# Patient Record
Sex: FEMALE | Race: WHITE | Employment: OTHER | URBAN - METROPOLITAN AREA
[De-identification: names, ages, dates, MRNs, and addresses within clinical notes are randomized per-mention and may not be internally consistent; named-entity substitution may affect disease eponyms.]

---

## 2018-11-02 ENCOUNTER — APPOINTMENT (OUTPATIENT)
Dept: GENERAL RADIOLOGY | Age: 68
End: 2018-11-02
Payer: MEDICARE

## 2018-11-02 ENCOUNTER — HOSPITAL ENCOUNTER (EMERGENCY)
Age: 68
Discharge: HOME | End: 2018-11-02
Attending: EMERGENCY MEDICINE
Payer: MEDICARE

## 2018-11-02 ENCOUNTER — APPOINTMENT (OUTPATIENT)
Dept: CT IMAGING | Age: 68
End: 2018-11-02
Payer: MEDICARE

## 2018-11-02 VITALS
HEART RATE: 97 BPM | WEIGHT: 120 LBS | RESPIRATION RATE: 18 BRPM | SYSTOLIC BLOOD PRESSURE: 127 MMHG | TEMPERATURE: 98 F | DIASTOLIC BLOOD PRESSURE: 73 MMHG | OXYGEN SATURATION: 97 %

## 2018-11-02 DIAGNOSIS — F10.10 ETOH ABUSE: ICD-10-CM
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
## 2018-11-02 DIAGNOSIS — R74.01 TRANSAMINITIS: ICD-10-CM

## 2018-11-02 LAB
A/G RATIO: 0.8 (ref 1.1–2.2)
ALBUMIN SERPL-MCNC: 3.8 G/DL (ref 3.4–5)
ALP BLD-CCNC: 245 U/L (ref 40–129)
ALT SERPL-CCNC: 52 U/L (ref 10–40)
AMMONIA: 72 UMOL/L (ref 11–51)
ANION GAP SERPL CALCULATED.3IONS-SCNC: 14 MMOL/L (ref 3–16)
APTT: 36.7 SEC (ref 26–36)
AST SERPL-CCNC: 260 U/L (ref 15–37)
BILIRUB SERPL-MCNC: 2.2 MG/DL (ref 0–1)
BUN BLDV-MCNC: 3 MG/DL (ref 7–20)
CALCIUM SERPL-MCNC: 9.1 MG/DL (ref 8.3–10.6)
CHLORIDE BLD-SCNC: 99 MMOL/L (ref 99–110)
CO2: 27 MMOL/L (ref 21–32)
CREAT SERPL-MCNC: <0.5 MG/DL (ref 0.6–1.2)
GFR AFRICAN AMERICAN: >60
GFR NON-AFRICAN AMERICAN: >60
GLOBULIN: 5 G/DL
GLUCOSE BLD-MCNC: 117 MG/DL (ref 70–99)
HCT VFR BLD CALC: 42.8 % (ref 36–48)
HEMOGLOBIN: 14.7 G/DL (ref 12–16)
INR BLD: 1.19 (ref 0.86–1.14)
LACTIC ACID: 3.3 MMOL/L (ref 0.4–2)
MAGNESIUM: 2 MG/DL (ref 1.8–2.4)
MCH RBC QN AUTO: 34.2 PG (ref 26–34)
MCHC RBC AUTO-ENTMCNC: 34.4 G/DL (ref 31–36)
MCV RBC AUTO: 99.6 FL (ref 80–100)
PDW BLD-RTO: 15.5 % (ref 12.4–15.4)
PLATELET # BLD: 132 K/UL (ref 135–450)
PMV BLD AUTO: 8 FL (ref 5–10.5)
POTASSIUM REFLEX MAGNESIUM: 3.4 MMOL/L (ref 3.5–5.1)
PROTHROMBIN TIME: 13.6 SEC (ref 9.8–13)
RBC # BLD: 4.3 M/UL (ref 4–5.2)
SODIUM BLD-SCNC: 140 MMOL/L (ref 136–145)
SPECIMEN STATUS: NORMAL
TOTAL PROTEIN: 8.8 G/DL (ref 6.4–8.2)
WBC # BLD: 5.3 K/UL (ref 4–11)

## 2018-11-02 PROCEDURE — 80053 COMPREHEN METABOLIC PANEL: CPT

## 2018-11-02 PROCEDURE — 96360 HYDRATION IV INFUSION INIT: CPT

## 2018-11-02 PROCEDURE — 36415 COLL VENOUS BLD VENIPUNCTURE: CPT

## 2018-11-02 PROCEDURE — 85027 COMPLETE CBC AUTOMATED: CPT

## 2018-11-02 PROCEDURE — 82140 ASSAY OF AMMONIA: CPT

## 2018-11-02 PROCEDURE — 70450 CT HEAD/BRAIN W/O DYE: CPT

## 2018-11-02 PROCEDURE — 83735 ASSAY OF MAGNESIUM: CPT

## 2018-11-02 PROCEDURE — 85730 THROMBOPLASTIN TIME PARTIAL: CPT

## 2018-11-02 PROCEDURE — 83605 ASSAY OF LACTIC ACID: CPT

## 2018-11-02 PROCEDURE — 71046 X-RAY EXAM CHEST 2 VIEWS: CPT

## 2018-11-02 PROCEDURE — 85610 PROTHROMBIN TIME: CPT

## 2018-11-02 PROCEDURE — 93005 ELECTROCARDIOGRAM TRACING: CPT | Performed by: EMERGENCY MEDICINE

## 2018-11-02 PROCEDURE — 2580000003 HC RX 258: Performed by: EMERGENCY MEDICINE

## 2018-11-02 PROCEDURE — 99284 EMERGENCY DEPT VISIT MOD MDM: CPT

## 2018-11-02 RX ORDER — 0.9 % SODIUM CHLORIDE 0.9 %
1000 INTRAVENOUS SOLUTION INTRAVENOUS ONCE
Status: COMPLETED | OUTPATIENT
Start: 2018-11-02 | End: 2018-11-02

## 2018-11-02 RX ORDER — OMEPRAZOLE 20 MG/1
20 CAPSULE, DELAYED RELEASE ORAL DAILY
Status: ON HOLD | COMMUNITY
End: 2018-12-10 | Stop reason: HOSPADM

## 2018-11-02 RX ADMIN — SODIUM CHLORIDE 1000 ML: 9 INJECTION, SOLUTION INTRAVENOUS at 21:58

## 2018-11-03 LAB
EKG ATRIAL RATE: 95 BPM
EKG DIAGNOSIS: NORMAL
EKG P AXIS: 77 DEGREES
EKG P-R INTERVAL: 172 MS
EKG Q-T INTERVAL: 390 MS
EKG QRS DURATION: 88 MS
EKG QTC CALCULATION (BAZETT): 490 MS
EKG R AXIS: 67 DEGREES
EKG T AXIS: 61 DEGREES
EKG VENTRICULAR RATE: 95 BPM

## 2018-11-03 PROCEDURE — 93010 ELECTROCARDIOGRAM REPORT: CPT | Performed by: INTERNAL MEDICINE

## 2018-11-03 NOTE — ED PROVIDER NOTES
David Robins CHIEF COMPLAINT  Fall (EMS called for possible fall by ,  patient outside for over 25 minutes, ETOH )      HISTORY OF PRESENT ILLNESS  Kirby Cody is a 80 y.o. female with a history of Esophagitis, EtOH abuse who presents to the ED complaining of fall while walking dog. Was in front yard and she says she just fell to her needs, did not trip and had no LOC. No headache or dizziness or vision changes. No abd pain or n/v/d. She does have hx of daily drinking. Report from triage was LOC but pt not altered. No sob or chest apin or palpitations. No other complaints, modifying factors or associated symptoms. I have reviewed the following from the nursing documentation. Past Medical History:   Diagnosis Date    Esophagitis     Hypertension      Past Surgical History:   Procedure Laterality Date    ENDOSCOPIC ULTRASOUND (UPPER)       History reviewed. No pertinent family history. Social History     Social History    Marital status:      Spouse name: N/A    Number of children: N/A    Years of education: N/A     Occupational History    Not on file. Social History Main Topics    Smoking status: Never Smoker    Smokeless tobacco: Never Used    Alcohol use Yes    Drug use: No    Sexual activity: Not on file     Other Topics Concern    Not on file     Social History Narrative    No narrative on file     Current Facility-Administered Medications   Medication Dose Route Frequency Provider Last Rate Last Dose    0.9 % sodium chloride bolus  1,000 mL Intravenous Once Jesse Marrero, DO         Current Outpatient Prescriptions   Medication Sig Dispense Refill    omeprazole (PRILOSEC) 20 MG delayed release capsule Take 20 mg by mouth daily       Allergies   Allergen Reactions    Azithromycin Nausea And Vomiting    Sulfa Antibiotics Rash       REVIEW OF SYSTEMS  10 systems reviewed, pertinent positives per HPI otherwise noted to be negative.     PHYSICAL EXAM  BP (!) 164/103 Pulse 108   Temp 98 °F (36.7 °C) (Oral)   Resp 22   Wt 120 lb (54.4 kg)   SpO2 96%   GENERAL APPEARANCE: Awake and alert. Cooperative. No acute distress. HEAD: Normocephalic. Atraumatic. EYES: PERRL. EOM's grossly intact. No icterus. ENT: Mucous membranes are moist.   NECK: Supple. HEART: RRR. LUNGS: Respirations unlabored. CTAB. Good air exchange. Speaking comfortably in full sentences. BACK: No midline spinal tenderness or step-off. ABDOMEN: Soft. Non-distended. Non-tender. No guarding or rebound. Normal bowel sounds. EXTREMITIES: No peripheral edema. Moves all extremities equally. All extremities neurovascularly intact. SKIN: Warm and dry. No acute rashes. No jaundice. NEUROLOGICAL: Alert and oriented. No gross facial drooping. Strength 5/5, sensation intact. Normal coordination. Gait normal.   PSYCHIATRIC: Normal mood and affect. LABS  I have reviewed all labs for this visit.    Results for orders placed or performed during the hospital encounter of 11/02/18   Comprehensive Metabolic Panel w/ Reflex to MG   Result Value Ref Range    Sodium 140 136 - 145 mmol/L    Potassium reflex Magnesium 3.4 (L) 3.5 - 5.1 mmol/L    Chloride 99 99 - 110 mmol/L    CO2 27 21 - 32 mmol/L    Anion Gap 14 3 - 16    Glucose 117 (H) 70 - 99 mg/dL    BUN 3 (L) 7 - 20 mg/dL    CREATININE <0.5 (L) 0.6 - 1.2 mg/dL    GFR Non-African American >60 >60    GFR African American >60 >60    Calcium 9.1 8.3 - 10.6 mg/dL    Total Protein 8.8 (H) 6.4 - 8.2 g/dL    Alb 3.8 3.4 - 5.0 g/dL    Albumin/Globulin Ratio 0.8 (L) 1.1 - 2.2    Total Bilirubin 2.2 (H) 0.0 - 1.0 mg/dL    Alkaline Phosphatase 245 (H) 40 - 129 U/L    ALT 52 (H) 10 - 40 U/L     (H) 15 - 37 U/L    Globulin 5.0 g/dL   CBC   Result Value Ref Range    WBC 5.3 4.0 - 11.0 K/uL    RBC 4.30 4.00 - 5.20 M/uL    Hemoglobin 14.7 12.0 - 16.0 g/dL    Hematocrit 42.8 36.0 - 48.0 %    MCV 99.6 80.0 - 100.0 fL    MCH 34.2 (H) 26.0 - 34.0 pg    MCHC 34.4 31.0 - 36.0

## 2018-11-09 ENCOUNTER — OFFICE VISIT (OUTPATIENT)
Dept: ORTHOPEDIC SURGERY | Age: 68
End: 2018-11-09
Payer: MEDICARE

## 2018-11-09 DIAGNOSIS — M89.8X1 PAIN IN SCAPULA: ICD-10-CM

## 2018-11-09 DIAGNOSIS — M51.34 DDD (DEGENERATIVE DISC DISEASE), THORACIC: ICD-10-CM

## 2018-11-09 DIAGNOSIS — R52 PAIN: Primary | ICD-10-CM

## 2018-11-09 PROCEDURE — G8400 PT W/DXA NO RESULTS DOC: HCPCS | Performed by: NURSE PRACTITIONER

## 2018-11-09 PROCEDURE — 99203 OFFICE O/P NEW LOW 30 MIN: CPT | Performed by: NURSE PRACTITIONER

## 2018-11-09 PROCEDURE — 1036F TOBACCO NON-USER: CPT | Performed by: NURSE PRACTITIONER

## 2018-11-09 PROCEDURE — 3017F COLORECTAL CA SCREEN DOC REV: CPT | Performed by: NURSE PRACTITIONER

## 2018-11-09 PROCEDURE — 1101F PT FALLS ASSESS-DOCD LE1/YR: CPT | Performed by: NURSE PRACTITIONER

## 2018-11-09 PROCEDURE — 1090F PRES/ABSN URINE INCON ASSESS: CPT | Performed by: NURSE PRACTITIONER

## 2018-11-09 PROCEDURE — 4040F PNEUMOC VAC/ADMIN/RCVD: CPT | Performed by: NURSE PRACTITIONER

## 2018-11-09 PROCEDURE — G8484 FLU IMMUNIZE NO ADMIN: HCPCS | Performed by: NURSE PRACTITIONER

## 2018-11-09 PROCEDURE — G8421 BMI NOT CALCULATED: HCPCS | Performed by: NURSE PRACTITIONER

## 2018-11-09 PROCEDURE — 1123F ACP DISCUSS/DSCN MKR DOCD: CPT | Performed by: NURSE PRACTITIONER

## 2018-11-09 PROCEDURE — G8427 DOCREV CUR MEDS BY ELIG CLIN: HCPCS | Performed by: NURSE PRACTITIONER

## 2018-11-09 RX ORDER — MELOXICAM 7.5 MG/1
7.5 TABLET ORAL DAILY
Qty: 30 TABLET | Refills: 0 | Status: SHIPPED | OUTPATIENT
Start: 2018-11-09 | End: 2018-12-06

## 2018-11-10 NOTE — PROGRESS NOTES
show: No acute fractures or deformities; quite a bit of degeneration noted throughout the thoracic spine    Assessment: Thoracic DDD; pain in scapula    Plan: The patient was given a prescription for meloxicam and instructed not to take any other NSAIDs while she is taking this medication. She was given an order for formal physical therapy. She will ice and heat area. She'll follow up with Dr. Kellie Peterson 4 weeks if her pain does not improve. No orders of the defined types were placed in this encounter.

## 2018-11-26 ENCOUNTER — OFFICE VISIT (OUTPATIENT)
Dept: ORTHOPEDIC SURGERY | Age: 68
End: 2018-11-26
Payer: MEDICARE

## 2018-11-26 VITALS — HEIGHT: 66 IN | BODY MASS INDEX: 19.37 KG/M2

## 2018-11-26 DIAGNOSIS — M40.204 KYPHOSIS OF THORACIC REGION, UNSPECIFIED KYPHOSIS TYPE: ICD-10-CM

## 2018-11-26 DIAGNOSIS — G25.89 SCAPULAR DYSKINESIS: Primary | ICD-10-CM

## 2018-11-26 DIAGNOSIS — M51.34 DDD (DEGENERATIVE DISC DISEASE), THORACIC: ICD-10-CM

## 2018-11-26 PROCEDURE — 4040F PNEUMOC VAC/ADMIN/RCVD: CPT | Performed by: FAMILY MEDICINE

## 2018-11-26 PROCEDURE — G8427 DOCREV CUR MEDS BY ELIG CLIN: HCPCS | Performed by: FAMILY MEDICINE

## 2018-11-26 PROCEDURE — 99203 OFFICE O/P NEW LOW 30 MIN: CPT | Performed by: FAMILY MEDICINE

## 2018-11-26 PROCEDURE — 1090F PRES/ABSN URINE INCON ASSESS: CPT | Performed by: FAMILY MEDICINE

## 2018-11-26 PROCEDURE — 1101F PT FALLS ASSESS-DOCD LE1/YR: CPT | Performed by: FAMILY MEDICINE

## 2018-11-26 PROCEDURE — 3017F COLORECTAL CA SCREEN DOC REV: CPT | Performed by: FAMILY MEDICINE

## 2018-11-26 PROCEDURE — G8484 FLU IMMUNIZE NO ADMIN: HCPCS | Performed by: FAMILY MEDICINE

## 2018-11-26 PROCEDURE — G8400 PT W/DXA NO RESULTS DOC: HCPCS | Performed by: FAMILY MEDICINE

## 2018-11-26 PROCEDURE — G8420 CALC BMI NORM PARAMETERS: HCPCS | Performed by: FAMILY MEDICINE

## 2018-11-26 PROCEDURE — 1036F TOBACCO NON-USER: CPT | Performed by: FAMILY MEDICINE

## 2018-11-26 PROCEDURE — 1123F ACP DISCUSS/DSCN MKR DOCD: CPT | Performed by: FAMILY MEDICINE

## 2018-11-26 RX ORDER — IBUPROFEN 600 MG/1
600 TABLET ORAL 3 TIMES DAILY PRN
Qty: 90 TABLET | Refills: 2 | Status: ON HOLD | OUTPATIENT
Start: 2018-11-26 | End: 2018-12-10 | Stop reason: HOSPADM

## 2018-12-06 ENCOUNTER — APPOINTMENT (OUTPATIENT)
Dept: GENERAL RADIOLOGY | Age: 68
DRG: 640 | End: 2018-12-06
Payer: MEDICARE

## 2018-12-06 ENCOUNTER — APPOINTMENT (OUTPATIENT)
Dept: CT IMAGING | Age: 68
DRG: 640 | End: 2018-12-06
Payer: MEDICARE

## 2018-12-06 ENCOUNTER — HOSPITAL ENCOUNTER (INPATIENT)
Age: 68
LOS: 4 days | Discharge: HOME | DRG: 640 | End: 2018-12-10
Attending: EMERGENCY MEDICINE | Admitting: INTERNAL MEDICINE
Payer: MEDICARE

## 2018-12-06 DIAGNOSIS — R17 ELEVATED BILIRUBIN: ICD-10-CM

## 2018-12-06 DIAGNOSIS — E87.1 HYPONATREMIA: ICD-10-CM

## 2018-12-06 DIAGNOSIS — R79.89 ELEVATED LACTIC ACID LEVEL: ICD-10-CM

## 2018-12-06 DIAGNOSIS — R79.89 INCREASED AMMONIA LEVEL: ICD-10-CM

## 2018-12-06 DIAGNOSIS — E86.0 DEHYDRATION: ICD-10-CM

## 2018-12-06 DIAGNOSIS — R77.8 ELEVATED TROPONIN: ICD-10-CM

## 2018-12-06 DIAGNOSIS — R74.01 ELEVATED TRANSAMINASE LEVEL: ICD-10-CM

## 2018-12-06 DIAGNOSIS — E87.6 HYPOKALEMIA: Primary | ICD-10-CM

## 2018-12-06 PROBLEM — K76.82 HEPATIC ENCEPHALOPATHY: Status: ACTIVE | Noted: 2018-12-06

## 2018-12-06 PROBLEM — G93.41 ACUTE METABOLIC ENCEPHALOPATHY: Status: ACTIVE | Noted: 2018-12-06

## 2018-12-06 PROBLEM — E87.20 LACTIC ACIDOSIS: Status: ACTIVE | Noted: 2018-12-06

## 2018-12-06 PROBLEM — R94.31 QT PROLONGATION: Status: ACTIVE | Noted: 2018-12-06

## 2018-12-06 LAB
A/G RATIO: 0.7 (ref 1.1–2.2)
ALBUMIN SERPL-MCNC: 3 G/DL (ref 3.4–5)
ALP BLD-CCNC: 210 U/L (ref 40–129)
ALT SERPL-CCNC: 64 U/L (ref 10–40)
AMMONIA: 106 UMOL/L (ref 11–51)
ANION GAP SERPL CALCULATED.3IONS-SCNC: 10 MMOL/L (ref 3–16)
ANION GAP SERPL CALCULATED.3IONS-SCNC: 11 MMOL/L (ref 3–16)
AST SERPL-CCNC: 227 U/L (ref 15–37)
BASOPHILS ABSOLUTE: 0 K/UL (ref 0–0.2)
BASOPHILS RELATIVE PERCENT: 0.4 %
BILIRUB SERPL-MCNC: 3.9 MG/DL (ref 0–1)
BUN BLDV-MCNC: 6 MG/DL (ref 7–20)
BUN BLDV-MCNC: 6 MG/DL (ref 7–20)
CALCIUM SERPL-MCNC: 8.1 MG/DL (ref 8.3–10.6)
CALCIUM SERPL-MCNC: 8.7 MG/DL (ref 8.3–10.6)
CHLORIDE BLD-SCNC: 77 MMOL/L (ref 99–110)
CHLORIDE BLD-SCNC: 86 MMOL/L (ref 99–110)
CO2: 30 MMOL/L (ref 21–32)
CO2: 36 MMOL/L (ref 21–32)
CREAT SERPL-MCNC: <0.5 MG/DL (ref 0.6–1.2)
CREAT SERPL-MCNC: <0.5 MG/DL (ref 0.6–1.2)
EKG ATRIAL RATE: 92 BPM
EKG DIAGNOSIS: NORMAL
EKG P AXIS: 60 DEGREES
EKG P-R INTERVAL: 182 MS
EKG Q-T INTERVAL: 432 MS
EKG QRS DURATION: 104 MS
EKG QTC CALCULATION (BAZETT): 534 MS
EKG R AXIS: 42 DEGREES
EKG T AXIS: 53 DEGREES
EKG VENTRICULAR RATE: 92 BPM
EOSINOPHILS ABSOLUTE: 0 K/UL (ref 0–0.6)
EOSINOPHILS RELATIVE PERCENT: 0.2 %
GFR AFRICAN AMERICAN: >60
GFR AFRICAN AMERICAN: >60
GFR NON-AFRICAN AMERICAN: >60
GFR NON-AFRICAN AMERICAN: >60
GLOBULIN: 4.6 G/DL
GLUCOSE BLD-MCNC: 125 MG/DL (ref 70–99)
GLUCOSE BLD-MCNC: 136 MG/DL (ref 70–99)
HCT VFR BLD CALC: 41.5 % (ref 36–48)
HEMOGLOBIN: 14.5 G/DL (ref 12–16)
LACTIC ACID: 1.3 MMOL/L (ref 0.4–2)
LACTIC ACID: 2.2 MMOL/L (ref 0.4–2)
LYMPHOCYTES ABSOLUTE: 0.5 K/UL (ref 1–5.1)
LYMPHOCYTES RELATIVE PERCENT: 6.1 %
MAGNESIUM: 2.1 MG/DL (ref 1.8–2.4)
MAGNESIUM: 2.2 MG/DL (ref 1.8–2.4)
MCH RBC QN AUTO: 34.8 PG (ref 26–34)
MCHC RBC AUTO-ENTMCNC: 34.9 G/DL (ref 31–36)
MCV RBC AUTO: 99.8 FL (ref 80–100)
MONOCYTES ABSOLUTE: 0.6 K/UL (ref 0–1.3)
MONOCYTES RELATIVE PERCENT: 7.1 %
NEUTROPHILS ABSOLUTE: 7.6 K/UL (ref 1.7–7.7)
NEUTROPHILS RELATIVE PERCENT: 86.2 %
PDW BLD-RTO: 15.8 % (ref 12.4–15.4)
PLATELET # BLD: 187 K/UL (ref 135–450)
PMV BLD AUTO: 8.7 FL (ref 5–10.5)
POTASSIUM REFLEX MAGNESIUM: 3 MMOL/L (ref 3.5–5.1)
POTASSIUM SERPL-SCNC: 2.1 MMOL/L (ref 3.5–5.1)
POTASSIUM SERPL-SCNC: 4 MMOL/L (ref 3.5–5.1)
RBC # BLD: 4.16 M/UL (ref 4–5.2)
SODIUM BLD-SCNC: 123 MMOL/L (ref 136–145)
SODIUM BLD-SCNC: 127 MMOL/L (ref 136–145)
TOTAL PROTEIN: 7.6 G/DL (ref 6.4–8.2)
TROPONIN: 0.02 NG/ML
TROPONIN: 0.04 NG/ML
TROPONIN: 0.06 NG/ML
WBC # BLD: 8.8 K/UL (ref 4–11)

## 2018-12-06 PROCEDURE — 99285 EMERGENCY DEPT VISIT HI MDM: CPT

## 2018-12-06 PROCEDURE — 93005 ELECTROCARDIOGRAM TRACING: CPT | Performed by: EMERGENCY MEDICINE

## 2018-12-06 PROCEDURE — 6370000000 HC RX 637 (ALT 250 FOR IP): Performed by: INTERNAL MEDICINE

## 2018-12-06 PROCEDURE — 93010 ELECTROCARDIOGRAM REPORT: CPT | Performed by: INTERNAL MEDICINE

## 2018-12-06 PROCEDURE — 80053 COMPREHEN METABOLIC PANEL: CPT

## 2018-12-06 PROCEDURE — 72125 CT NECK SPINE W/O DYE: CPT

## 2018-12-06 PROCEDURE — 2580000003 HC RX 258: Performed by: EMERGENCY MEDICINE

## 2018-12-06 PROCEDURE — 84484 ASSAY OF TROPONIN QUANT: CPT

## 2018-12-06 PROCEDURE — 6370000000 HC RX 637 (ALT 250 FOR IP): Performed by: NURSE PRACTITIONER

## 2018-12-06 PROCEDURE — 83735 ASSAY OF MAGNESIUM: CPT

## 2018-12-06 PROCEDURE — 6360000002 HC RX W HCPCS: Performed by: INTERNAL MEDICINE

## 2018-12-06 PROCEDURE — 6370000000 HC RX 637 (ALT 250 FOR IP)

## 2018-12-06 PROCEDURE — 36415 COLL VENOUS BLD VENIPUNCTURE: CPT

## 2018-12-06 PROCEDURE — 2580000003 HC RX 258: Performed by: INTERNAL MEDICINE

## 2018-12-06 PROCEDURE — 82140 ASSAY OF AMMONIA: CPT

## 2018-12-06 PROCEDURE — 83930 ASSAY OF BLOOD OSMOLALITY: CPT

## 2018-12-06 PROCEDURE — 70450 CT HEAD/BRAIN W/O DYE: CPT

## 2018-12-06 PROCEDURE — 1200000000 HC SEMI PRIVATE

## 2018-12-06 PROCEDURE — 83605 ASSAY OF LACTIC ACID: CPT

## 2018-12-06 PROCEDURE — 84132 ASSAY OF SERUM POTASSIUM: CPT

## 2018-12-06 PROCEDURE — 85025 COMPLETE CBC W/AUTO DIFF WBC: CPT

## 2018-12-06 PROCEDURE — 6360000002 HC RX W HCPCS: Performed by: EMERGENCY MEDICINE

## 2018-12-06 PROCEDURE — 71046 X-RAY EXAM CHEST 2 VIEWS: CPT

## 2018-12-06 PROCEDURE — 96374 THER/PROPH/DIAG INJ IV PUSH: CPT

## 2018-12-06 RX ORDER — LACTULOSE 10 G/15ML
20 SOLUTION ORAL 3 TIMES DAILY
Status: DISCONTINUED | OUTPATIENT
Start: 2018-12-06 | End: 2018-12-07

## 2018-12-06 RX ORDER — PANTOPRAZOLE SODIUM 40 MG/1
40 TABLET, DELAYED RELEASE ORAL
Status: DISCONTINUED | OUTPATIENT
Start: 2018-12-07 | End: 2018-12-10 | Stop reason: HOSPADM

## 2018-12-06 RX ORDER — SODIUM CHLORIDE 0.9 % (FLUSH) 0.9 %
10 SYRINGE (ML) INJECTION EVERY 12 HOURS SCHEDULED
Status: DISCONTINUED | OUTPATIENT
Start: 2018-12-06 | End: 2018-12-06 | Stop reason: SDUPTHER

## 2018-12-06 RX ORDER — ONDANSETRON 2 MG/ML
4 INJECTION INTRAMUSCULAR; INTRAVENOUS ONCE
Status: COMPLETED | OUTPATIENT
Start: 2018-12-06 | End: 2018-12-06

## 2018-12-06 RX ORDER — SODIUM CHLORIDE 0.9 % (FLUSH) 0.9 %
10 SYRINGE (ML) INJECTION PRN
Status: DISCONTINUED | OUTPATIENT
Start: 2018-12-06 | End: 2018-12-10 | Stop reason: HOSPADM

## 2018-12-06 RX ORDER — SODIUM CHLORIDE 9 MG/ML
INJECTION, SOLUTION INTRAVENOUS CONTINUOUS
Status: DISCONTINUED | OUTPATIENT
Start: 2018-12-06 | End: 2018-12-08

## 2018-12-06 RX ORDER — LACTULOSE 10 G/15ML
20 SOLUTION ORAL ONCE
Status: COMPLETED | OUTPATIENT
Start: 2018-12-06 | End: 2018-12-06

## 2018-12-06 RX ORDER — POTASSIUM CHLORIDE AND SODIUM CHLORIDE 900; 300 MG/100ML; MG/100ML
INJECTION, SOLUTION INTRAVENOUS CONTINUOUS
Status: DISCONTINUED | OUTPATIENT
Start: 2018-12-06 | End: 2018-12-06

## 2018-12-06 RX ORDER — SODIUM CHLORIDE 0.9 % (FLUSH) 0.9 %
10 SYRINGE (ML) INJECTION EVERY 12 HOURS SCHEDULED
Status: DISCONTINUED | OUTPATIENT
Start: 2018-12-06 | End: 2018-12-10 | Stop reason: HOSPADM

## 2018-12-06 RX ORDER — POTASSIUM CHLORIDE 20MEQ/15ML
40 LIQUID (ML) ORAL PRN
Status: DISCONTINUED | OUTPATIENT
Start: 2018-12-06 | End: 2018-12-10 | Stop reason: HOSPADM

## 2018-12-06 RX ORDER — ONDANSETRON 2 MG/ML
4 INJECTION INTRAMUSCULAR; INTRAVENOUS EVERY 6 HOURS PRN
Status: CANCELLED | OUTPATIENT
Start: 2018-12-06

## 2018-12-06 RX ORDER — 0.9 % SODIUM CHLORIDE 0.9 %
1000 INTRAVENOUS SOLUTION INTRAVENOUS ONCE
Status: COMPLETED | OUTPATIENT
Start: 2018-12-06 | End: 2018-12-06

## 2018-12-06 RX ORDER — POTASSIUM CHLORIDE 20 MEQ/1
40 TABLET, EXTENDED RELEASE ORAL PRN
Status: DISCONTINUED | OUTPATIENT
Start: 2018-12-06 | End: 2018-12-10 | Stop reason: HOSPADM

## 2018-12-06 RX ORDER — POTASSIUM CHLORIDE 7.45 MG/ML
10 INJECTION INTRAVENOUS PRN
Status: DISCONTINUED | OUTPATIENT
Start: 2018-12-06 | End: 2018-12-10 | Stop reason: HOSPADM

## 2018-12-06 RX ORDER — SODIUM CHLORIDE 0.9 % (FLUSH) 0.9 %
10 SYRINGE (ML) INJECTION PRN
Status: DISCONTINUED | OUTPATIENT
Start: 2018-12-06 | End: 2018-12-06 | Stop reason: SDUPTHER

## 2018-12-06 RX ADMIN — SODIUM CHLORIDE 1000 ML: 9 INJECTION, SOLUTION INTRAVENOUS at 12:13

## 2018-12-06 RX ADMIN — POTASSIUM CHLORIDE 10 MEQ: 7.46 INJECTION, SOLUTION INTRAVENOUS at 18:21

## 2018-12-06 RX ADMIN — LACTULOSE 20 G: 20 SOLUTION ORAL at 21:11

## 2018-12-06 RX ADMIN — ENOXAPARIN SODIUM 40 MG: 40 INJECTION SUBCUTANEOUS at 15:27

## 2018-12-06 RX ADMIN — POTASSIUM CHLORIDE 10 MEQ: 7.46 INJECTION, SOLUTION INTRAVENOUS at 21:11

## 2018-12-06 RX ADMIN — ONDANSETRON 4 MG: 2 INJECTION INTRAMUSCULAR; INTRAVENOUS at 12:12

## 2018-12-06 RX ADMIN — POTASSIUM CHLORIDE AND SODIUM CHLORIDE: 900; 300 INJECTION, SOLUTION INTRAVENOUS at 12:14

## 2018-12-06 RX ADMIN — SODIUM CHLORIDE: 9 INJECTION, SOLUTION INTRAVENOUS at 17:13

## 2018-12-06 RX ADMIN — Medication: at 17:13

## 2018-12-06 RX ADMIN — POTASSIUM CHLORIDE 10 MEQ: 7.46 INJECTION, SOLUTION INTRAVENOUS at 22:10

## 2018-12-06 RX ADMIN — LACTULOSE 20 G: 20 SOLUTION ORAL at 13:12

## 2018-12-06 RX ADMIN — POTASSIUM CHLORIDE AND SODIUM CHLORIDE: 900; 300 INJECTION, SOLUTION INTRAVENOUS at 15:28

## 2018-12-06 RX ADMIN — LACTULOSE 20 G: 20 SOLUTION ORAL at 15:27

## 2018-12-06 RX ADMIN — POTASSIUM CHLORIDE 40 MEQ: 20 SOLUTION ORAL at 17:12

## 2018-12-06 NOTE — H&P
Hospital Medicine History & Physical      PCP: Ethel Velazquez    Date of Admission: 12/6/2018    Date of Service: Pt seen/examined on above date  and Admitted to Inpatient with expected LOS greater than two midnights due to medical therapy. Chief Complaint: generalized weakness     History Of Present Illness:   80 y.o. female with hx of esophagitis, HTN  who presented to [facility] with generalized weakness with poor PO intake due to poor appetite for several weeks which has been getting progressively worse in past few days and needing assistance with her ADLs with unsteady gait. Was confused yesterday and more lethargic,  denied noticing any facial droop, slurred speech or focal weakness. Pt has been following with GI, Dr Gilma Hardin for abnormal LFTs and recently had liver US and is supposed to f/u with him tomorrow. Pt denied any chest pain, fever, chills, nausea, melena, hematochezia,  vomiting or diarrhea. Hx of heavy alcohol use but quit 6 weeks ago. Had a mechanical fall while walking her dog about a month ago but denied hitting her head or LOC. No falls since then. Past Medical History:          Diagnosis Date    Esophagitis     Hypertension        Past Surgical History:          Procedure Laterality Date    ENDOSCOPIC ULTRASOUND (UPPER)         Medications Prior to Admission:      Prior to Admission medications    Medication Sig Start Date End Date Taking? Authorizing Provider   ibuprofen (ADVIL;MOTRIN) 600 MG tablet Take 1 tablet by mouth 3 times daily as needed for Pain 11/26/18  Yes Cristina Elizabeth MD   omeprazole (PRILOSEC) 20 MG delayed release capsule Take 20 mg by mouth daily   Yes Historical Provider, MD       Allergies:  Azithromycin and Sulfa antibiotics    Social History:      The patient currently lives with     TOBACCO:   reports that she has never smoked. She has never used smokeless tobacco.  ETOH:   reports that she drinks alcohol.       Family History: Kathia Calvilloevaria, BACTERIA, RBCUA, BLOODU, [address], [address]    Radiology:     EKG:  I have reviewed the EKG with the following interpretation: NSR with no acute ST-T changes. Prolonged . CT Head WO Contrast   Preliminary Result   1. Mild atrophy. Atherosclerotic calcification of the vasculature. Mild   chronic small vessel ischemic white matter disease. Stable right basal   ganglia lacunar infarct. 2. No clear evidence for acute intracranial hemorrhage or midline shift. 3. Overall, stable noncontrast head CT compared with 11/02/2018. CT Cervical Spine WO Contrast   Final Result   No acute abnormality of the cervical spine. Mild degenerative changes lower cervical spine. XR CHEST STANDARD (2 VW)   Final Result   No acute abnormality           RUQ US done on 10/10/2018  Gallstones with thickened gallbladder wall. Gallbladder wall thickening is nonspecific, but is associated with cholecystitis, both acute and chronic. Echogenic liver consistent with diffuse hepatocellular disease such as fatty infiltration (hepatic steatosis), acute or chronic hepatitis, or cirrhosis  ASSESSMENT:    Active Hospital Problems    Diagnosis Date Noted    Hepatic encephalopathy ([facility] [address] [K72.90] 12/06/2018    Hyponatremia [E87.1] 12/06/2018    Acute metabolic encephalopathy [S62.51] 12/06/2018    Hypokalemia [E87.6] 12/06/2018    Elevated troponin [R74.8] 12/06/2018    QT prolongation [R94.31] 12/06/2018    Lactic acidosis [E87.2] 12/06/2018    Elevated LFTs [R94.5] 12/06/2018         PLAN:      Acute metab encephalopathy : likely due to dehydration, hepatic encephalopathy, hyponatremia. CT head was non acute - old rt basal ganglial lacunar infarct. Supportive care for acute morbidities     Hyponatremia: likely due to vol depletion, beer protomania ( though pt reports she quit alcohol use 6 weeks ago).  Start IVF, further eval with osmolality studies, nephro consult     Hepatic encephalopathy:

## 2018-12-06 NOTE — ED PROVIDER NOTES
SYSTEM PROVIDED HISTORY: CONFUSION/DELIRIUM, ALTERED LOC, UNEXPLAINED TECHNOLOGIST PROVIDED HISTORY: Has a \"code stroke\" or \"stroke alert\" been called? ->No Ordering Physician Provided Reason for Exam: fall 1mth ago, pt states she is weak and doesnt want to eat Acuity: Acute Type of Exam: Initial A 27-year-old female with confusion and delirium; altered level of consciousness FINDINGS: BRAIN/VENTRICLES: Foramen magnum and 4th ventricle appear patent. Atherosclerotic calcification of the bilateral vertebral and parasellar carotid arteries. Stable mild diffuse prominence of the sulci, cisterns, and extra-axial spaces. No abnormal extra-axial fluid collections identified. Stable mild enlargement of the ventricles which are unchanged in size and remain midline in position. Stable right basal ganglia lacunar infarct on image 25, series. Stable mild periventricular and subcortical white matter hypoattenuation, most consistent with mild chronic small vessel ischemic white matter disease. ORBITS: The visualized portion of the orbits demonstrate no acute abnormality. SINUSES: The visualized paranasal sinuses and mastoid air cells demonstrate no acute abnormality. SOFT TISSUES/SKULL:  No acute abnormality of the visualized skull or soft tissues. 1. Mild atrophy. Atherosclerotic calcification of the vasculature. Mild chronic small vessel ischemic white matter disease. Stable right basal ganglia lacunar infarct. 2. No clear evidence for acute intracranial hemorrhage or midline shift. 3. Overall, stable noncontrast head CT compared with 11/02/2018. Ct Cervical Spine Wo Contrast    Result Date: 12/6/2018  EXAMINATION: CT OF THE CERVICAL SPINE WITHOUT CONTRAST 12/6/2018 12:29 pm TECHNIQUE: CT of the cervical spine was performed without the administration of intravenous contrast. Multiplanar reformatted images are provided for review.  Dose modulation, iterative reconstruction, and/or weight based adjustment of the

## 2018-12-06 NOTE — ED PROVIDER NOTES
[facility]  ED      CHIEF COMPLAINT  Fatigue (x 3 weeks, not eating, too weak to ambulate on own, )      HISTORY OF PRESENT ILLNESS  Juhi Monroe is a 80 y.o. female who presents to the ED complaining of fatigue. Reports nausea today, chronic back issues. Not eating well for couple of weeks, completely lost her appetite, shaky, leg swelling Monday night (feet and ankles). Denies fevers, chills, sweats. Denies CP, SOB. Denies abdominal. Denies vomiting or diarrhea.  provided most of the HPI as patient was unable to contribute much due to her current medical condition. Seeing Dr. Heidi Colvin for elevated liver enzymes, had a scan at his office yesterday, scheduled follow up tomorrow. Elevated liver enzymes were found with a routine physical.     The patient is currently rating their pain as 0/10. Treatments tried prior to arrival in the ED include none. The patient denies other complaints, modifying factors or associated symptoms. The patient arrived to the ED via EMS transport. Patient is accompanied by  who is bedside for the visit. PAST MEDICAL HISTORY    Past Medical History:   Diagnosis Date    Esophagitis     Hypertension        SURGICAL HISTORY    Past Surgical History:   Procedure Laterality Date    ENDOSCOPIC ULTRASOUND (UPPER)         CURRENT MEDICATIONS    Current Outpatient Rx   Medication Sig Dispense Refill    ibuprofen (ADVIL;MOTRIN) 600 MG tablet Take 1 tablet by mouth 3 times daily as needed for Pain 90 tablet 2    omeprazole (PRILOSEC) 20 MG delayed release capsule Take 20 mg by mouth daily         ALLERGIES    Allergies   Allergen Reactions    Azithromycin Nausea And Vomiting    Sulfa Antibiotics Rash       FAMILY HISTORY    History reviewed. No pertinent family history.     SOCIAL HISTORY    Social History     Social History    Marital status:      Spouse name: N/A    Number of children: N/A    Years of education: N/A     Social 12/06/18   CBC Auto Differential   Result Value Ref Range    WBC 8.8 4.0 - 11.0 K/uL    RBC 4.16 4.00 - 5.20 M/uL    Hemoglobin 14.5 12.0 - 16.0 g/dL    Hematocrit 41.5 36.0 - 48.0 %    MCV 99.8 80.0 - 100.0 fL    MCH 34.8 (H) 26.0 - 34.0 pg    MCHC 34.9 31.0 - 36.0 g/dL    RDW 15.8 (H) 12.4 - 15.4 %    Platelets 638 631 - 239 K/uL    MPV 8.7 5.0 - 10.5 fL    Neutrophils % 86.2 %    Lymphocytes % 6.1 %    Monocytes % 7.1 %    Eosinophils % 0.2 %    Basophils % 0.4 %    Neutrophils # 7.6 1.7 - 7.7 K/uL    Lymphocytes # 0.5 (L) 1.0 - 5.1 K/uL    Monocytes # 0.6 0.0 - 1.3 K/uL    Eosinophils # 0.0 0.0 - 0.6 K/uL    Basophils # 0.0 0.0 - 0.2 K/uL   Comprehensive Metabolic Panel   Result Value Ref Range    Sodium 123 (L) 136 - 145 mmol/L    Potassium 2.1 (LL) 3.5 - 5.1 mmol/L    Chloride 77 (L) 99 - 110 mmol/L    CO2 36 (H) 21 - 32 mmol/L    Anion Gap 10 3 - 16    Glucose 136 (H) 70 - 99 mg/dL    BUN 6 (L) 7 - 20 mg/dL    CREATININE <0.5 (L) 0.6 - 1.2 mg/dL    GFR Non-African American >60 >60    GFR African American >60 >60    Calcium 8.7 8.3 - 10.6 mg/dL    Total Protein 7.6 6.4 - 8.2 g/dL    Alb 3.0 (L) 3.4 - 5.0 g/dL    Albumin/Globulin Ratio 0.7 (L) 1.1 - 2.2    Total Bilirubin 3.9 (H) 0.0 - 1.0 mg/dL    Alkaline Phosphatase 210 (H) 40 - 129 U/L    ALT 64 (H) 10 - 40 U/L     (H) 15 - 37 U/L    Globulin 4.6 g/dL   Troponin   Result Value Ref Range    Troponin 0.02 (H) <0.01 ng/mL   Lactic Acid, Plasma   Result Value Ref Range    Lactic Acid 2.2 (H) 0.4 - 2.0 mmol/L   Ammonia   Result Value Ref Range    Ammonia 106 (HH) 11 - 51 umol/L   Magnesium   Result Value Ref Range    Magnesium 2.20 1.80 - 2.40 mg/dL   EKG 12 Lead   Result Value Ref Range    Ventricular Rate 92 BPM    Atrial Rate 92 BPM    P-R Interval 182 ms    QRS Duration 104 ms    Q-T Interval 432 ms    QTc Calculation (Bazett) 534 ms    P Axis 60 degrees    R Axis 42 degrees    T Axis 53 degrees    Diagnosis       Normal sinus rhythmNonspecific T wave is no prevertebral soft tissue swelling. No acute abnormality of the cervical spine. Mild degenerative changes lower cervical spine. ED COURSE/MDM  Patient seen and evaluated. Old records reviewed. Diagnostic testing reviewed and results discussed. I have seen and evaluated this patient with supervising physician: Cadence Bray MD. We thoroughly discussed the history, physical exam, diagnostic testing, emergency department course, plan and disposition. Florian Spurling presented to the ED today with above noted complaints. Physical exam is reveal the patient to appear to not feel well but is not toxic. She does appear dehydrated as her mucous membranes are dry and her lips are dry and cracked. CBC is without evidence of systemic infection as there is no leukocytosis or differential shift. H&H is normal and stable. Platelets are within normal limits. CMP shows a low sodium of 123, low potassium of 2.1, low chloride of 77. BUN/creatinine also low at 6/0.5. Total bilirubin is elevated at 3.9, alk phos elevated at 210, ALT elevated at 64, AST elevated at 227. Ammonia level is elevated at 106. Patient will be given lactulose for this. Magnesium is normal at 2.2. EKG shows normal sinus rhythm, nonspecific T wave abnormality seen. Troponin is elevated at 0.02. Lactic acid level is elevated at 2.2. Patient will be given IV fluid bolus in the ED for this level. I do not feel that this is related to infection, I feel this is more related to dehydration. CXR is without acute findings. CT head was obtained and without acute findings. There is a stable right basal ganglia lacunar infarct. CT cervical spine without acute findings.     Pt was given the following medications or treatments in the ED:   Medications   0.9% NaCl with KCl 40 mEq infusion ( Intravenous New Bag 12/6/18 1214)   0.9 % sodium chloride bolus (1,000 mLs Intravenous New Bag 12/6/18 1213)   ondansetron (ZOFRAN)

## 2018-12-07 PROBLEM — E44.0 MODERATE MALNUTRITION (HCC): Chronic | Status: ACTIVE | Noted: 2018-12-07

## 2018-12-07 LAB
ALBUMIN SERPL-MCNC: 2.4 G/DL (ref 3.4–5)
ALP BLD-CCNC: 145 U/L (ref 40–129)
ALT SERPL-CCNC: 50 U/L (ref 10–40)
AMMONIA: 100 UMOL/L (ref 11–51)
ANION GAP SERPL CALCULATED.3IONS-SCNC: 9 MMOL/L (ref 3–16)
ANION GAP SERPL CALCULATED.3IONS-SCNC: 9 MMOL/L (ref 3–16)
AST SERPL-CCNC: 177 U/L (ref 15–37)
BASOPHILS ABSOLUTE: 0 K/UL (ref 0–0.2)
BASOPHILS RELATIVE PERCENT: 0.5 %
BILIRUB SERPL-MCNC: 3.1 MG/DL (ref 0–1)
BILIRUBIN DIRECT: 1.8 MG/DL (ref 0–0.3)
BILIRUBIN, INDIRECT: 1.3 MG/DL (ref 0–1)
BUN BLDV-MCNC: 5 MG/DL (ref 7–20)
BUN BLDV-MCNC: 6 MG/DL (ref 7–20)
CALCIUM SERPL-MCNC: 7.8 MG/DL (ref 8.3–10.6)
CALCIUM SERPL-MCNC: 8.1 MG/DL (ref 8.3–10.6)
CHLORIDE BLD-SCNC: 92 MMOL/L (ref 99–110)
CHLORIDE BLD-SCNC: 95 MMOL/L (ref 99–110)
CHLORIDE URINE RANDOM: <20 MMOL/L
CO2: 27 MMOL/L (ref 21–32)
CO2: 30 MMOL/L (ref 21–32)
CREAT SERPL-MCNC: <0.5 MG/DL (ref 0.6–1.2)
CREAT SERPL-MCNC: <0.5 MG/DL (ref 0.6–1.2)
CREATININE URINE: 53.2 MG/DL (ref 28–259)
EKG ATRIAL RATE: 80 BPM
EKG DIAGNOSIS: NORMAL
EKG P AXIS: 72 DEGREES
EKG P-R INTERVAL: 174 MS
EKG Q-T INTERVAL: 452 MS
EKG QRS DURATION: 98 MS
EKG QTC CALCULATION (BAZETT): 521 MS
EKG R AXIS: 46 DEGREES
EKG T AXIS: 35 DEGREES
EKG VENTRICULAR RATE: 80 BPM
EOSINOPHILS ABSOLUTE: 0.1 K/UL (ref 0–0.6)
EOSINOPHILS RELATIVE PERCENT: 0.9 %
GFR AFRICAN AMERICAN: >60
GFR AFRICAN AMERICAN: >60
GFR NON-AFRICAN AMERICAN: >60
GFR NON-AFRICAN AMERICAN: >60
GLUCOSE BLD-MCNC: 131 MG/DL (ref 70–99)
GLUCOSE BLD-MCNC: 91 MG/DL (ref 70–99)
HCT VFR BLD CALC: 35.9 % (ref 36–48)
HEMOGLOBIN: 12.5 G/DL (ref 12–16)
IRON SATURATION: 58 % (ref 15–50)
IRON: 93 UG/DL (ref 37–145)
LV EF: 55 %
LVEF MODALITY: NORMAL
LYMPHOCYTES ABSOLUTE: 1.2 K/UL (ref 1–5.1)
LYMPHOCYTES RELATIVE PERCENT: 18.2 %
MCH RBC QN AUTO: 34.8 PG (ref 26–34)
MCHC RBC AUTO-ENTMCNC: 34.9 G/DL (ref 31–36)
MCV RBC AUTO: 99.6 FL (ref 80–100)
MONOCYTES ABSOLUTE: 0.5 K/UL (ref 0–1.3)
MONOCYTES RELATIVE PERCENT: 7.4 %
NEUTROPHILS ABSOLUTE: 4.8 K/UL (ref 1.7–7.7)
NEUTROPHILS RELATIVE PERCENT: 73 %
OSMOLALITY URINE: 254 MOSM/KG (ref 390–1070)
OSMOLALITY: 267 MOSM/KG (ref 278–305)
PDW BLD-RTO: 16.3 % (ref 12.4–15.4)
PLATELET # BLD: 159 K/UL (ref 135–450)
PMV BLD AUTO: 8.3 FL (ref 5–10.5)
POTASSIUM SERPL-SCNC: 2.8 MMOL/L (ref 3.5–5.1)
POTASSIUM SERPL-SCNC: 3.6 MMOL/L (ref 3.5–5.1)
POTASSIUM, UR: 22.6 MMOL/L
RBC # BLD: 3.6 M/UL (ref 4–5.2)
SODIUM BLD-SCNC: 131 MMOL/L (ref 136–145)
SODIUM BLD-SCNC: 131 MMOL/L (ref 136–145)
SODIUM URINE: 29 MMOL/L
TOTAL IRON BINDING CAPACITY: 160 UG/DL (ref 260–445)
TOTAL PROTEIN: 6 G/DL (ref 6.4–8.2)
TSH REFLEX FT4: 2.02 UIU/ML (ref 0.27–4.2)
WBC # BLD: 6.6 K/UL (ref 4–11)

## 2018-12-07 PROCEDURE — 83540 ASSAY OF IRON: CPT

## 2018-12-07 PROCEDURE — 82140 ASSAY OF AMMONIA: CPT

## 2018-12-07 PROCEDURE — 1200000000 HC SEMI PRIVATE

## 2018-12-07 PROCEDURE — 80076 HEPATIC FUNCTION PANEL: CPT

## 2018-12-07 PROCEDURE — 97110 THERAPEUTIC EXERCISES: CPT

## 2018-12-07 PROCEDURE — G8979 MOBILITY GOAL STATUS: HCPCS

## 2018-12-07 PROCEDURE — 93010 ELECTROCARDIOGRAM REPORT: CPT | Performed by: INTERNAL MEDICINE

## 2018-12-07 PROCEDURE — 36415 COLL VENOUS BLD VENIPUNCTURE: CPT

## 2018-12-07 PROCEDURE — 84300 ASSAY OF URINE SODIUM: CPT

## 2018-12-07 PROCEDURE — 2580000003 HC RX 258: Performed by: INTERNAL MEDICINE

## 2018-12-07 PROCEDURE — 6370000000 HC RX 637 (ALT 250 FOR IP): Performed by: INTERNAL MEDICINE

## 2018-12-07 PROCEDURE — 85025 COMPLETE CBC W/AUTO DIFF WBC: CPT

## 2018-12-07 PROCEDURE — G8988 SELF CARE GOAL STATUS: HCPCS

## 2018-12-07 PROCEDURE — 82436 ASSAY OF URINE CHLORIDE: CPT

## 2018-12-07 PROCEDURE — 93005 ELECTROCARDIOGRAM TRACING: CPT | Performed by: INTERNAL MEDICINE

## 2018-12-07 PROCEDURE — 6360000002 HC RX W HCPCS: Performed by: INTERNAL MEDICINE

## 2018-12-07 PROCEDURE — G8978 MOBILITY CURRENT STATUS: HCPCS

## 2018-12-07 PROCEDURE — 97530 THERAPEUTIC ACTIVITIES: CPT

## 2018-12-07 PROCEDURE — 83935 ASSAY OF URINE OSMOLALITY: CPT

## 2018-12-07 PROCEDURE — 82570 ASSAY OF URINE CREATININE: CPT

## 2018-12-07 PROCEDURE — 80048 BASIC METABOLIC PNL TOTAL CA: CPT

## 2018-12-07 PROCEDURE — 99223 1ST HOSP IP/OBS HIGH 75: CPT | Performed by: INTERNAL MEDICINE

## 2018-12-07 PROCEDURE — 97162 PT EVAL MOD COMPLEX 30 MIN: CPT

## 2018-12-07 PROCEDURE — 84133 ASSAY OF URINE POTASSIUM: CPT

## 2018-12-07 PROCEDURE — 93306 TTE W/DOPPLER COMPLETE: CPT

## 2018-12-07 PROCEDURE — 83550 IRON BINDING TEST: CPT

## 2018-12-07 PROCEDURE — 84443 ASSAY THYROID STIM HORMONE: CPT

## 2018-12-07 PROCEDURE — 97116 GAIT TRAINING THERAPY: CPT

## 2018-12-07 PROCEDURE — 97166 OT EVAL MOD COMPLEX 45 MIN: CPT

## 2018-12-07 PROCEDURE — G8987 SELF CARE CURRENT STATUS: HCPCS

## 2018-12-07 RX ORDER — LACTULOSE 10 G/15ML
20 SOLUTION ORAL 3 TIMES DAILY PRN
Status: DISCONTINUED | OUTPATIENT
Start: 2018-12-07 | End: 2018-12-07

## 2018-12-07 RX ORDER — LACTULOSE 10 G/15ML
20 SOLUTION ORAL 2 TIMES DAILY
Status: DISCONTINUED | OUTPATIENT
Start: 2018-12-08 | End: 2018-12-10 | Stop reason: HOSPADM

## 2018-12-07 RX ADMIN — LACTULOSE 20 G: 20 SOLUTION ORAL at 07:58

## 2018-12-07 RX ADMIN — POTASSIUM CHLORIDE 10 MEQ: 7.46 INJECTION, SOLUTION INTRAVENOUS at 11:47

## 2018-12-07 RX ADMIN — PANTOPRAZOLE SODIUM 40 MG: 40 TABLET, DELAYED RELEASE ORAL at 07:58

## 2018-12-07 RX ADMIN — POTASSIUM CHLORIDE 10 MEQ: 7.46 INJECTION, SOLUTION INTRAVENOUS at 09:29

## 2018-12-07 RX ADMIN — POTASSIUM CHLORIDE 10 MEQ: 7.46 INJECTION, SOLUTION INTRAVENOUS at 10:41

## 2018-12-07 RX ADMIN — SODIUM CHLORIDE: 9 INJECTION, SOLUTION INTRAVENOUS at 06:27

## 2018-12-07 RX ADMIN — POTASSIUM CHLORIDE 10 MEQ: 7.46 INJECTION, SOLUTION INTRAVENOUS at 07:59

## 2018-12-07 RX ADMIN — SODIUM CHLORIDE: 9 INJECTION, SOLUTION INTRAVENOUS at 21:00

## 2018-12-07 RX ADMIN — POTASSIUM CHLORIDE 40 MEQ: 20 SOLUTION ORAL at 07:58

## 2018-12-07 RX ADMIN — ENOXAPARIN SODIUM 40 MG: 40 INJECTION SUBCUTANEOUS at 07:58

## 2018-12-07 NOTE — PROGRESS NOTES
Hospitalist Progress Note      PCP: Rajwinder Walsh    Date of Admission: 12/6/2018    Chief Complaint:  [address] Course: admitted with acute metabolic encephalopathy, hyponatremia, hypokalemia. Subjective:     Pt less lethargic today. Denies any abd pain, N/V but having profuse non bloody BM ( one every hr). Pt is on lactulose. Medications:  Reviewed    Infusion Medications    sodium chloride 75 mL/hr at 12/07/18 6349     Scheduled Medications    pantoprazole  40 mg Oral QAM AC    sodium chloride flush  10 mL Intravenous 2 times per day    enoxaparin  40 mg Subcutaneous Daily    lactulose  20 g Oral TID    pneumococcal 13-valent conjugate  0.5 mL Intramuscular Once     PRN Meds: sodium chloride flush, magnesium hydroxide, potassium chloride **OR** potassium chloride **OR** potassium chloride      Intake/Output Summary (Last 24 hours) at 12/07/18 1004  Last data filed at 12/07/18 7464   Gross per 24 hour   Intake           2117.2 ml   Output             1000 ml   Net           1117.2 ml       Physical Exam Performed:    /71   Pulse 82   Temp 98 °F (36.7 °C) (Oral)   Resp 18   Ht 5' 6\" (1.676 m)   Wt 132 lb 0.9 oz (59.9 kg)   SpO2 94%   BMI 21.31 kg/m²     General appearance:  No apparent distress, appears stated age and cooperative. HEENT:  Normal cephalic, atraumatic without obvious deformity. Pupils equal, round,  Extra ocular muscles intact. Icteric. Neck: Supple, with full range of motion. No jugular venous distention. Trachea midline. Respiratory:  Normal respiratory effort. Clear to auscultation, bilaterally without Rales/Wheezes/Rhonchi. Cardiovascular:  Regular rate and rhythm with normal S1/S2 without murmurs, rubs or gallops. Abdomen: Soft, mildly distended, non-tender, non-distended with normal bowel sounds. No tense ascites   Musculoskeletal:  No clubbing, cyanosis. bilat pedal edema R>L( chronic per pt due to varicose veins) .  No calf tenderness   Skin: regional wall motion abnormalities.   Normal left ventricular diastolic filling pressure.   The left atrium is moderately dilated.   No significant valvular heart disease. Assessment/Plan:    Active Hospital Problems    Diagnosis Date Noted    Hepatic encephalopathy ([facility] [address] [K72.90] 12/06/2018    Hyponatremia [E87.1] 12/06/2018    Acute metabolic encephalopathy [U76.51] 12/06/2018    Hypokalemia [E87.6] 12/06/2018    Elevated troponin [R74.8] 12/06/2018    QT prolongation [R94.31] 12/06/2018    Lactic acidosis [E87.2] 12/06/2018    Elevated LFTs [R94.5] 12/06/2018     Acute metab encephalopathy : likely due to dehydration, hepatic encephalopathy, hyponatremia. CT head was non acute - old rt basal ganglial lacunar infarct. Supportive care for acute morbidities      Hyponatremia: likely due to vol depletion with decreased solute intake. Appreciate nephro help. Improving with IVF        Hepatic encephalopathy: ammonia level  and LFTs were elevated. Having profuse diarrhea- decreased lactulose dose      Hypokalemia : likely due to poor intake, diarrhea . Mag was normal. Monitoring and replacing per K protocol            Elevated LFTs: likely due to alcohol use. Follows with GI, Dr Godwin Lopez. Had recent [address] on 10/10/2018 which showed diffuse hepatocellular disease consistent with hepatic steatosis , cirrhosis or hepatitis with gall stone and GB wall thickening. GI assisting. Appreciate help. LFts improving         Hx of alcohol abuse: reportedly quit 6 weeks ago and denied any hx of  DT's,  ETOH withdrawal. Monitor on CIWA protocol with no ativan coverage for now       Lactic acidosis : likely due to vol depletion with no overt signs of infection. Resolved with vol expansion with IVF        QT prolongation :  likely due to hypokalemia. Replacing K. Mag was normal. Avoid QT prolonging meds      Elevated troponin: Likely due to electrolyte abnormalities, dehydration. EKG with no acute ischemic changes.  Appreciate

## 2018-12-07 NOTE — PROGRESS NOTES
Department of Internal Medicine  Nephrology Progress Note        SUBJECTIVE:    We are following this patient for Hyponatremia. The patient was seen and examined; she feels well today with no CP, SOB, nausea or vomiting. ROS: No fever or chills. Social: Family at bedside. Physical Exam:    VITALS:  /77   Pulse 91   Temp 98.1 °F (36.7 °C) (Oral)   Resp 16   Ht 5' 6\" (1.676 m)   Wt 132 lb 0.9 oz (59.9 kg)   SpO2 93%   BMI 21.31 kg/m²     General appearance: Seems comfortable, no acute distress. Neck: Trachea midline, thyroid normal.   Lungs:  Non labored breathing, CTA to anterior auscultation. Heart:  S1S2 normal, rub or gallop. No peripheral edema. Abdomen: Soft, non-tender, no organomegaly. Skin: No lesions or rashes, warm to touch. DATA:    CBC:   Lab Results   Component Value Date    WBC 6.6 12/07/2018    RBC 3.60 12/07/2018    HGB 12.5 12/07/2018    HCT 35.9 12/07/2018    MCV 99.6 12/07/2018    MCH 34.8 12/07/2018    MCHC 34.9 12/07/2018    RDW 16.3 12/07/2018     12/07/2018    MPV 8.3 12/07/2018     BMP:    Lab Results   Component Value Date     12/07/2018    K 2.8 12/07/2018    K 3.0 12/06/2018    CL 92 12/07/2018    CO2 30 12/07/2018    BUN 5 12/07/2018    LABALBU 2.4 12/07/2018    CREATININE <0.5 12/07/2018    CALCIUM 7.8 12/07/2018    GFRAA >60 12/07/2018    LABGLOM >60 12/07/2018    GLUCOSE 91 12/07/2018       IMPRESSION/RECOMMENDATIONS:      1- Hyponatremia: Likely secondary to intravascular volume depletion, along with decreased solute intake. Serum sodium trending up nicely with current management, monitor. 2- Hypokalemia: Continue with PRN supplementation and monitor.

## 2018-12-07 NOTE — PROGRESS NOTES
edema   · Wound Type: None  · Current Nutrition Therapies:  · Oral Diet Orders: General, Fluid Restriction   · Oral Diet intake: Unable to assess  · Oral Nutrition Supplement (ONS) Orders: None  · ONS intake: Unable to assess  · Anthropometric Measures:  · Ht: 5' 6\" (167.6 cm)   · Current Body Wt: 132 lb (59.9 kg)  · Admission Body Wt: 115 lb (52.2 kg) (stated )  · Ideal Body Wt: 130 lb (59 kg),  · BMI Classification: BMI 18.5 - 24.9 Normal Weight    Nutrition Interventions:   Continue current diet, Start ONS  Continued Inpatient Monitoring, Education Initiated    Nutrition Evaluation:   · Evaluation: Goals set   · Goals: Tolerate diet and consume greater than 50% of meals and ONS this admission     · Monitoring: Nutrition Progression, Meal Intake, Supplement Intake, Diet Tolerance, Weight      Electronically signed by Vega Deras.  Yvette De Souza RD, LD on 12/7/18 at 2:07 PM    Contact Number: Office: [phone]; [address] [place] [address] Road: [phone]

## 2018-12-07 NOTE — PROGRESS NOTES
Occupational Therapy   Occupational Therapy Initial Assessment  Date: 2018   Patient Name: Kathie Hardy  MRN: [id number]     : 1950    Date of Service: 2018    Discharge Recommendations:  IP Rehab         Patient Diagnosis(es): The primary encounter diagnosis was Hypokalemia. Diagnoses of Dehydration, Elevated lactic acid level, Increased ammonia level, Hyponatremia, Elevated troponin, Elevated transaminase level, and Elevated bilirubin were also pertinent to this visit. has a past medical history of Esophagitis and Hypertension. has a past surgical history that includes endoscopic ultrasound (upper).            Restrictions  Restrictions/Precautions  Restrictions/Precautions: General Precautions, Fall Risk  Position Activity Restriction  Other position/activity restrictions: High fall risk per nursing assessment, up with assist, IV    Subjective   General  Chart Reviewed: Yes  Patient assessed for rehabilitation services?: Yes  Family / Caregiver Present: Yes (spouse)  Referring Practitioner: Dr. Aaron Martell  Diagnosis: encephalopathy  General Comment  Comments: RN cleared pt for OT eval; pt in bed, pt & spouse agreeable to therapy  Pain Assessment  Patient Currently in Pain: No    Social/Functional History  Social/Functional History  Lives With: Spouse  Type of Home: House  Home Layout: Multi-level, Able to Live on Main level with bedroom/bathroom (full bath on main floor)  Home Access: Level entry (level entry at basement, 12 steps to main floor once in basement)  Bathroom Shower/Tub: Tub/Shower unit, Shower chair with back  Bathroom Toilet: Standard  Home Equipment: Cane  ADL Assistance: Needs assistance (needs assist with bathing and dressing)  Homemaking Assistance: Needs assistance  Ambulation Assistance: Independent (until Wednesday, but short distance)  Transfer Assistance: Independent  Active : Yes (until back began to hurt badly)  Occupation: Part time employment  Type of

## 2018-12-07 NOTE — CONSULTS
Gastroenterology Consult Note    Patient:   Marko Guajardo   YOB: 1950   Facility:   [address] [place] [address]   Referring/PCP: [id number]  Date:     12/7/2018  Consultant:   Linden Arriola MD    Subjective: This 80 y.o. female was admitted 12/6/2018 with a diagnosis of \"Acute metabolic encephalopathy [M57.38]  Acute metabolic encephalopathy [S97.01]\" and is seen in consultation regarding \"cirrhosis\". Information was obtained from interview of the patient, examination of the patient, and review of records. I did update the past medical, surgical, social and / or family history. Cirrhosis mild in liver for ?months associated w fatigue     Current status  Present  Diet Order: DIET GENERAL;   Recently, she has experienced no abdominal  Pain   The patient has also experienced no N/V, diarrhea/const, hematochezia, melena, fever      Prior to Admission medications    Medication Sig Start Date End Date Taking? Authorizing Provider   ibuprofen (ADVIL;MOTRIN) 600 MG tablet Take 1 tablet by mouth 3 times daily as needed for Pain 11/26/18  Yes Bunny Rodrigues MD   omeprazole (PRILOSEC) 20 MG delayed release capsule Take 20 mg by mouth daily   Yes Historical Provider, MD      Scheduled Medications:    pantoprazole  40 mg Oral QAM AC    sodium chloride flush  10 mL Intravenous 2 times per day    enoxaparin  40 mg Subcutaneous Daily    lactulose  20 g Oral TID    pneumococcal 13-valent conjugate  0.5 mL Intramuscular Once     Infusions:    sodium chloride 75 mL/hr at 12/07/18 0627     PRN Medications: sodium chloride flush, magnesium hydroxide, potassium chloride **OR** potassium chloride **OR** potassium chloride  Allergies:    Allergies   Allergen Reactions    Azithromycin Nausea And Vomiting    Sulfa Antibiotics Rash       Past Medical History:   Diagnosis Date    Esophagitis     Hypertension      Past Surgical History:   Procedure Laterality Date    ENDOSCOPIC ULTRASOUND
Patient seen and examined, consult note dictated. Assessment and Plan:    1- Hyponatremia: Likely secondary to intravascular volume depletion, along with decreased solute intake. - Start volume expansion using isotonic fluids. - Check urine electrolytes and osmolality.  - Monitor serial labs to avoid overcorrection (target sodium ~ 130 meq/L). 2- Hypokalemia: Secondary to decreased oral intake, proceed with PRN supplementation and monitor.
based upon the patient's clinical course and testing results. All questions and concerns were addressed to the patient/family. Alternatives to my treatment were discussed. The note was completed using EMR. Every effort was made to ensure accuracy; however, inadvertent computerized transcription errors may be present.

## 2018-12-08 LAB
ALBUMIN SERPL-MCNC: 2.6 G/DL (ref 3.4–5)
ALP BLD-CCNC: 145 U/L (ref 40–129)
ALT SERPL-CCNC: 52 U/L (ref 10–40)
AMMONIA: 103 UMOL/L (ref 11–51)
ANION GAP SERPL CALCULATED.3IONS-SCNC: 9 MMOL/L (ref 3–16)
AST SERPL-CCNC: 181 U/L (ref 15–37)
BILIRUB SERPL-MCNC: 3.5 MG/DL (ref 0–1)
BILIRUBIN DIRECT: 2.1 MG/DL (ref 0–0.3)
BILIRUBIN, INDIRECT: 1.4 MG/DL (ref 0–1)
BUN BLDV-MCNC: 6 MG/DL (ref 7–20)
CALCIUM SERPL-MCNC: 8.1 MG/DL (ref 8.3–10.6)
CHLORIDE BLD-SCNC: 96 MMOL/L (ref 99–110)
CHOLESTEROL, TOTAL: 227 MG/DL (ref 0–199)
CO2: 28 MMOL/L (ref 21–32)
CREAT SERPL-MCNC: <0.5 MG/DL (ref 0.6–1.2)
GFR AFRICAN AMERICAN: >60
GFR NON-AFRICAN AMERICAN: >60
GLUCOSE BLD-MCNC: 93 MG/DL (ref 70–99)
HDLC SERPL-MCNC: 24 MG/DL (ref 40–60)
LDL CHOLESTEROL CALCULATED: 186 MG/DL
POTASSIUM SERPL-SCNC: 3.4 MMOL/L (ref 3.5–5.1)
SODIUM BLD-SCNC: 133 MMOL/L (ref 136–145)
TOTAL PROTEIN: 6.2 G/DL (ref 6.4–8.2)
TRIGL SERPL-MCNC: 86 MG/DL (ref 0–150)
VLDLC SERPL CALC-MCNC: 17 MG/DL

## 2018-12-08 PROCEDURE — 36415 COLL VENOUS BLD VENIPUNCTURE: CPT

## 2018-12-08 PROCEDURE — 2580000003 HC RX 258: Performed by: INTERNAL MEDICINE

## 2018-12-08 PROCEDURE — 82784 ASSAY IGA/IGD/IGG/IGM EACH: CPT

## 2018-12-08 PROCEDURE — 84165 PROTEIN E-PHORESIS SERUM: CPT

## 2018-12-08 PROCEDURE — 6370000000 HC RX 637 (ALT 250 FOR IP): Performed by: INTERNAL MEDICINE

## 2018-12-08 PROCEDURE — 86376 MICROSOMAL ANTIBODY EACH: CPT

## 2018-12-08 PROCEDURE — 82105 ALPHA-FETOPROTEIN SERUM: CPT

## 2018-12-08 PROCEDURE — 6360000002 HC RX W HCPCS: Performed by: INTERNAL MEDICINE

## 2018-12-08 PROCEDURE — 80076 HEPATIC FUNCTION PANEL: CPT

## 2018-12-08 PROCEDURE — 80048 BASIC METABOLIC PNL TOTAL CA: CPT

## 2018-12-08 PROCEDURE — 1200000000 HC SEMI PRIVATE

## 2018-12-08 PROCEDURE — 80061 LIPID PANEL: CPT

## 2018-12-08 PROCEDURE — 82140 ASSAY OF AMMONIA: CPT

## 2018-12-08 PROCEDURE — 99232 SBSQ HOSP IP/OBS MODERATE 35: CPT | Performed by: INTERNAL MEDICINE

## 2018-12-08 PROCEDURE — 84155 ASSAY OF PROTEIN SERUM: CPT

## 2018-12-08 RX ADMIN — SODIUM CHLORIDE: 9 INJECTION, SOLUTION INTRAVENOUS at 06:02

## 2018-12-08 RX ADMIN — Medication 10 ML: at 09:36

## 2018-12-08 RX ADMIN — LACTULOSE 20 G: 20 SOLUTION ORAL at 21:20

## 2018-12-08 RX ADMIN — POTASSIUM CHLORIDE 40 MEQ: 20 TABLET, EXTENDED RELEASE ORAL at 12:58

## 2018-12-08 RX ADMIN — PANTOPRAZOLE SODIUM 40 MG: 40 TABLET, DELAYED RELEASE ORAL at 09:36

## 2018-12-08 RX ADMIN — LACTULOSE 20 G: 20 SOLUTION ORAL at 09:36

## 2018-12-08 RX ADMIN — ENOXAPARIN SODIUM 40 MG: 40 INJECTION SUBCUTANEOUS at 09:36

## 2018-12-08 RX ADMIN — Medication 10 ML: at 21:20

## 2018-12-08 NOTE — PROGRESS NOTES
Pt a/o, sitting up in chair. VSS. Assessment complete as charted. Abd distended with bowel sounds present. BLE's swollen. Repositioned for comfort. Call light in reach. Denies needs.

## 2018-12-08 NOTE — PLAN OF CARE
Problem: Falls - Risk of:  Goal: Will remain free from falls  Will remain free from falls   Outcome: Ongoing  Pt remains free from falls. Non skid socks in place. Bed alarm active. Bed locked and in lowest position. Call light and bedside table within reach. Will continue to monitor.

## 2018-12-08 NOTE — PROGRESS NOTES
Pain:  She reports no pain. Objective:  General Appearance: In no acute distress and not in pain. Vital signs: (most recent): Blood pressure (!) 147/80, pulse 88, temperature 98.1 °F (36.7 °C), temperature source Oral, resp. rate 16, height 5' 6\" (1.676 m), weight 132 lb 0.9 oz (59.9 kg), SpO2 95 %. Heart: Normal rate. Regular rhythm. S1 normal and S2 normal.    Abdomen: Abdomen is soft and distended. Bowel sounds are normal.   There is no abdominal tenderness. Assessment & Plan  79 yo w HTN and ETOH abuse (quit reportedly 6 weeks ago) admitted w generalized weakness w hyponatremia and hypokalemia. I am consulted for elev LFT's AST//64, gurjit 3.9,  (improving the next day already). Her w/u consisted of neg. USG. Fibroscan revealed cirrhosis , kPa 75, JAELYN 1:160 but neg. F-actin and AMA and Hep C-RNA. Fe 36%. R/O ETOH cirrhosis most likely, vs AIH much less likely.     Plan:   1. Awaiting anti-LKM, protein electophoresis, AFP  2. Will stop IVF due to the development of ascites and start a low salt diet (3-4 gms) since hyponatremia is usually dilutional in cirrhosis. 3. Consider paracenthesis and IV albumin  4. Consider liver Bx at some point  5. Abstain from ETOH  6. Needs EGD at some point for esoph variceal surveillance (possibly Monday or as outpatient)  7. F/U LFT's and BMP  8. Encourage nutrition, OT/PT  9.  Will follow     Kaitlyn Cao MD       [phone]    Kaitlyn Cao MD  12/8/2018

## 2018-12-08 NOTE — PLAN OF CARE
Problem: Falls - Risk of:  Goal: Will remain free from falls  Will remain free from falls   Outcome: Ongoing  Instructed pt to use call light as needed with ambulation. Bed locked and in lowest position. Bed/chair check in place. Non-skid socks in place. Call light in reach.

## 2018-12-09 LAB
ALBUMIN SERPL-MCNC: 2.5 G/DL (ref 3.4–5)
ALP BLD-CCNC: 134 U/L (ref 40–129)
ALT SERPL-CCNC: 53 U/L (ref 10–40)
AMMONIA: 62 UMOL/L (ref 11–51)
ANION GAP SERPL CALCULATED.3IONS-SCNC: 9 MMOL/L (ref 3–16)
AST SERPL-CCNC: 167 U/L (ref 15–37)
BILIRUB SERPL-MCNC: 3.6 MG/DL (ref 0–1)
BILIRUBIN DIRECT: 2 MG/DL (ref 0–0.3)
BILIRUBIN, INDIRECT: 1.6 MG/DL (ref 0–1)
BUN BLDV-MCNC: 8 MG/DL (ref 7–20)
CALCIUM SERPL-MCNC: 8.4 MG/DL (ref 8.3–10.6)
CHLORIDE BLD-SCNC: 98 MMOL/L (ref 99–110)
CO2: 25 MMOL/L (ref 21–32)
CREAT SERPL-MCNC: <0.5 MG/DL (ref 0.6–1.2)
GFR AFRICAN AMERICAN: >60
GFR NON-AFRICAN AMERICAN: >60
GLUCOSE BLD-MCNC: 91 MG/DL (ref 70–99)
POTASSIUM SERPL-SCNC: 3.6 MMOL/L (ref 3.5–5.1)
SODIUM BLD-SCNC: 132 MMOL/L (ref 136–145)
TOTAL PROTEIN: 6.2 G/DL (ref 6.4–8.2)

## 2018-12-09 PROCEDURE — 80076 HEPATIC FUNCTION PANEL: CPT

## 2018-12-09 PROCEDURE — 6370000000 HC RX 637 (ALT 250 FOR IP): Performed by: INTERNAL MEDICINE

## 2018-12-09 PROCEDURE — 36415 COLL VENOUS BLD VENIPUNCTURE: CPT

## 2018-12-09 PROCEDURE — 6360000002 HC RX W HCPCS: Performed by: INTERNAL MEDICINE

## 2018-12-09 PROCEDURE — 2580000003 HC RX 258: Performed by: INTERNAL MEDICINE

## 2018-12-09 PROCEDURE — 80048 BASIC METABOLIC PNL TOTAL CA: CPT

## 2018-12-09 PROCEDURE — 1200000000 HC SEMI PRIVATE

## 2018-12-09 PROCEDURE — 82140 ASSAY OF AMMONIA: CPT

## 2018-12-09 RX ADMIN — PANTOPRAZOLE SODIUM 40 MG: 40 TABLET, DELAYED RELEASE ORAL at 08:30

## 2018-12-09 RX ADMIN — LACTULOSE 20 G: 20 SOLUTION ORAL at 21:11

## 2018-12-09 RX ADMIN — ENOXAPARIN SODIUM 40 MG: 40 INJECTION SUBCUTANEOUS at 08:30

## 2018-12-09 RX ADMIN — Medication 10 ML: at 21:13

## 2018-12-09 RX ADMIN — Medication 10 ML: at 08:30

## 2018-12-09 RX ADMIN — LACTULOSE 20 G: 20 SOLUTION ORAL at 08:30

## 2018-12-09 NOTE — PROGRESS NOTES
%.    Subjective:  Symptoms:  Stable. Pain:  She reports no pain. Objective:  General Appearance: In no acute distress and not in pain. Vital signs: (most recent): Blood pressure 129/75, pulse 89, temperature 97.9 °F (36.6 °C), temperature source Oral, resp. rate 16, height 5' 6\" (1.676 m), weight 132 lb 0.9 oz (59.9 kg), SpO2 94 %. Heart: Normal rate. Regular rhythm. S1 normal and S2 normal.    Abdomen: Abdomen is distended. Bowel sounds are normal.   There is no abdominal tenderness. Assessment & Plan  81 yo w HTN and ETOH abuse (quit reportedly 6 weeks ago) admitted w generalized weakness w hyponatremia and hypokalemia. I am consulted for elev LFT's AST//64, gurjit 3.9,  (improving the next day already). Her w/u consisted of neg. USG. Fibroscan revealed cirrhosis , kPa 75, JAELYN 1:160 but neg. F-actin and AMA and Hep C-RNA. Fe 36%. R/O ETOH cirrhosis most likely, vs AIH much less likely.     Plan:   1. Awaiting anti-LKM, protein electophoresis, AFP  2. Continue to avoid IVF due to the development of ascites and start a low salt diet (3-4 gms) since hyponatremia is usually dilutional in cirrhosis. 3. Paracenthesis and IV albumin will be postponed since she is less distended today  4. Consider liver Bx at some point  5. Abstain from ETOH  6. Needs EGD at some point for esoph variceal surveillance   7. F/U LFT's and BMP  8. Encourage nutrition, OT/PT  9.  Will follow     Linden Arriola MD       (O) [phone]    Linden Arriola MD  12/9/2018

## 2018-12-09 NOTE — PROGRESS NOTES
left atrium is moderately dilated.   No significant valvular heart disease. Assessment/Plan:    Active Hospital Problems    Diagnosis Date Noted    Moderate malnutrition ([facility] [address] [E44.0] 12/07/2018    Hepatic encephalopathy (HCC) [K72.90] 12/06/2018    Hyponatremia [E87.1] 12/06/2018    Acute metabolic encephalopathy [C15.08] 12/06/2018    Hypokalemia [E87.6] 12/06/2018    Elevated troponin [R74.8] 12/06/2018    QT prolongation [R94.31] 12/06/2018    Lactic acidosis [E87.2] 12/06/2018    Elevated LFTs [R94.5] 12/06/2018     Acute metab encephalopathy : likely due to dehydration, hepatic encephalopathy, hyponatremia. CT head was non acute - old rt basal ganglial lato be improving to baseline with supportive care for acute morbidities      Hyponatremia: likely due to vol depletion with decreased solute intake. Improved with IVF- now d/tye. Continue fluid restriction. Monitor        Hepatic encephalopathy: improving,  Continue lactulose      Hypokalemia : likely due to poor intake, diarrhea . Mag was normal. Monitoring and replacing per K protocol . resolved           Cirrhosis with elevated LFTs: likely due to AIH per GI  , hx of alcohol abuse in past. GI assisting. LFTs improving though tot gurjit slightly uptrending. Monitor        Hx of alcohol abuse: reportedly quit 6 weeks ago and denied any hx of  DT's,  ETOH withdrawal. Monitor on CIWA protocol with no ativan coverage for now       Lactic acidosis : likely due to vol depletion with no overt signs of infection. Resolved with vol expansion with IVF        QT prolongation :  likely due to hypokalemia. Replaced K. Mag was normal. Avoid QT prolonging meds      Elevated troponin: Likely due to electrolyte abnormalities, dehydration. EKG with no acute ischemic changes. Appreciate cardio recs. TSH wnl,  Cardiac echo with normal EF         GERD : continue PPI        Hyperlipidemia: , Tot Chol 222.  No statin started at this time due to elevated LFTs

## 2018-12-10 VITALS
RESPIRATION RATE: 16 BRPM | HEIGHT: 66 IN | BODY MASS INDEX: 21.22 KG/M2 | TEMPERATURE: 97.8 F | SYSTOLIC BLOOD PRESSURE: 132 MMHG | WEIGHT: 132.06 LBS | OXYGEN SATURATION: 96 % | DIASTOLIC BLOOD PRESSURE: 76 MMHG | HEART RATE: 100 BPM

## 2018-12-10 LAB
ALBUMIN SERPL-MCNC: 2.3 G/DL (ref 3.4–5)
ALP BLD-CCNC: 127 U/L (ref 40–129)
ALT SERPL-CCNC: 46 U/L (ref 10–40)
ANION GAP SERPL CALCULATED.3IONS-SCNC: 7 MMOL/L (ref 3–16)
AST SERPL-CCNC: 152 U/L (ref 15–37)
BILIRUB SERPL-MCNC: 3.3 MG/DL (ref 0–1)
BILIRUBIN DIRECT: 1.9 MG/DL (ref 0–0.3)
BILIRUBIN, INDIRECT: 1.4 MG/DL (ref 0–1)
BUN BLDV-MCNC: 10 MG/DL (ref 7–20)
CALCIUM SERPL-MCNC: 8.3 MG/DL (ref 8.3–10.6)
CHLORIDE BLD-SCNC: 99 MMOL/L (ref 99–110)
CO2: 27 MMOL/L (ref 21–32)
CREAT SERPL-MCNC: <0.5 MG/DL (ref 0.6–1.2)
GFR AFRICAN AMERICAN: >60
GFR NON-AFRICAN AMERICAN: >60
GLUCOSE BLD-MCNC: 85 MG/DL (ref 70–99)
IGA: 342 MG/DL (ref 70–400)
IGG: 1870 MG/DL (ref 700–1600)
IGM: 89 MG/DL (ref 40–230)
POTASSIUM SERPL-SCNC: 3.7 MMOL/L (ref 3.5–5.1)
SODIUM BLD-SCNC: 133 MMOL/L (ref 136–145)
TOTAL PROTEIN: 5.9 G/DL (ref 6.4–8.2)

## 2018-12-10 PROCEDURE — 6370000000 HC RX 637 (ALT 250 FOR IP): Performed by: INTERNAL MEDICINE

## 2018-12-10 PROCEDURE — 80076 HEPATIC FUNCTION PANEL: CPT

## 2018-12-10 PROCEDURE — 6360000002 HC RX W HCPCS: Performed by: INTERNAL MEDICINE

## 2018-12-10 PROCEDURE — 36415 COLL VENOUS BLD VENIPUNCTURE: CPT

## 2018-12-10 PROCEDURE — 80048 BASIC METABOLIC PNL TOTAL CA: CPT

## 2018-12-10 PROCEDURE — G8989 SELF CARE D/C STATUS: HCPCS

## 2018-12-10 PROCEDURE — 97116 GAIT TRAINING THERAPY: CPT

## 2018-12-10 PROCEDURE — G8987 SELF CARE CURRENT STATUS: HCPCS

## 2018-12-10 PROCEDURE — G8988 SELF CARE GOAL STATUS: HCPCS

## 2018-12-10 PROCEDURE — 2580000003 HC RX 258: Performed by: INTERNAL MEDICINE

## 2018-12-10 PROCEDURE — 97535 SELF CARE MNGMENT TRAINING: CPT

## 2018-12-10 RX ORDER — LACTULOSE 10 G/15ML
20 SOLUTION ORAL 2 TIMES DAILY
Qty: 270 ML | Refills: 3 | Status: SHIPPED | OUTPATIENT
Start: 2018-12-10 | End: 2019-01-09

## 2018-12-10 RX ORDER — PANTOPRAZOLE SODIUM 40 MG/1
40 TABLET, DELAYED RELEASE ORAL
Qty: 30 TABLET | Refills: 3 | Status: SHIPPED | OUTPATIENT
Start: 2018-12-11 | End: 2018-12-10

## 2018-12-10 RX ORDER — LACTULOSE 10 G/15ML
20 SOLUTION ORAL 2 TIMES DAILY
Qty: 270 ML | Refills: 3 | Status: SHIPPED | OUTPATIENT
Start: 2018-12-10 | End: 2018-12-10

## 2018-12-10 RX ORDER — PANTOPRAZOLE SODIUM 40 MG/1
40 TABLET, DELAYED RELEASE ORAL
Qty: 30 TABLET | Refills: 3 | Status: SHIPPED | OUTPATIENT
Start: 2018-12-11

## 2018-12-10 RX ADMIN — LACTULOSE 20 G: 20 SOLUTION ORAL at 10:23

## 2018-12-10 RX ADMIN — RIFAXIMIN 550 MG: 550 TABLET ORAL at 10:23

## 2018-12-10 RX ADMIN — Medication 10 ML: at 10:24

## 2018-12-10 RX ADMIN — ENOXAPARIN SODIUM 40 MG: 40 INJECTION SUBCUTANEOUS at 10:23

## 2018-12-10 RX ADMIN — PANTOPRAZOLE SODIUM 40 MG: 40 TABLET, DELAYED RELEASE ORAL at 10:23

## 2018-12-10 NOTE — CARE COORDINATION
Chart reviewed day 4. Followed by GI, nephrology and IM. PT rec's with improvement from [place] to home with George Butcher. Encouraged to abstain from ETOH. Limiting free water intake. May need liver biopsy at some time. Patient is currently refusing HHC and would like to return home. Will need RW per therapy.   Hank Vargas RN

## 2018-12-10 NOTE — PROGRESS NOTES
Department of Internal Medicine  Nephrology Progress Note        SUBJECTIVE:    We are following this patient for Hyponatremia. Doing fine and has no new complains    ROS: No fever or chills. Social: No Family at bedside. Physical Exam:    VITALS:  /74   Pulse 92   Temp 98.3 °F (36.8 °C) (Oral)   Resp 16   Ht 5' 6\" (1.676 m)   Wt 132 lb 0.9 oz (59.9 kg)   SpO2 97%   BMI 21.31 kg/m²     General appearance: Seems comfortable, no acute distress. Neck: Trachea midline, thyroid normal.   Lungs:  Non labored breathing, CTA to anterior auscultation. Heart:  S1S2 normal, rub or gallop. No peripheral edema. Abdomen: Soft, non-tender, no organomegaly. Skin: No lesions or rashes, warm to touch. DATA:    CBC:   Lab Results   Component Value Date    WBC 6.6 12/07/2018    RBC 3.60 12/07/2018    HGB 12.5 12/07/2018    HCT 35.9 12/07/2018    MCV 99.6 12/07/2018    MCH 34.8 12/07/2018    MCHC 34.9 12/07/2018    RDW 16.3 12/07/2018     12/07/2018    MPV 8.3 12/07/2018     BMP:    Lab Results   Component Value Date     12/10/2018    K 3.7 12/10/2018    K 3.0 12/06/2018    CL 99 12/10/2018    CO2 27 12/10/2018    BUN 10 12/10/2018    LABALBU 2.3 12/10/2018    CREATININE <0.5 12/10/2018    CALCIUM 8.3 12/10/2018    GFRAA >60 12/10/2018    LABGLOM >60 12/10/2018    GLUCOSE 85 12/10/2018       IMPRESSION/RECOMMENDATIONS:      1- Hyponatremia: Likely secondary to intravascular volume depletion, along with decreased solute intake. Serum sodium stabilized at 131 to 133 meq/L;    -continue daily free water restriction for now. 2- Hypokalemia: Resolved s/p scheduled and PRN supplementation.

## 2018-12-10 NOTE — PROGRESS NOTES
Independent     Transfers  Stand Step Transfers: Independent  Sit to stand: Independent  Stand to sit: Independent        Coordination  Gross Motor: good coordination for all ADLs              Cognition  Overall Cognitive Status: Exceptions  Arousal/Alertness: Appropriate responses to stimuli  Following Commands: Follows multistep commands with increased time  Attention Span: Attends with cues to redirect  Memory: Decreased recall of recent events  Safety Judgement: Good awareness of safety precautions  Problem Solving: Assistance required to generate solutions  Insights: Decreased awareness of deficits  Initiation: Does not require cues  Sequencing: Requires cues for some                                         Assessment   Performance deficits / Impairments: Decreased functional mobility ; Decreased strength;Decreased ADL status; Decreased balance;Decreased cognition;Decreased safe awareness;Decreased high-level IADLs  Assessment: Pt presentst at baseline with independence for all mobility and ADLs. Pt has met all goals and spouse and pt express no concerns going home and states pt is moving better than she was PTA. Pt will be discharged from caseload. Recommend home OT to ensure safe transition home. Prognosis: Good  Patient Education: role of OT, safety  REQUIRES OT FOLLOW UP: No  Activity Tolerance  Activity Tolerance: Patient Tolerated treatment well  Safety Devices  Safety Devices in place: Yes  Type of devices: Chair alarm in place;Nurse notified; Left in chair;Gait belt          Plan   Plan  Times per week: 3-5x/week  Current Treatment Recommendations: ROM, Strengthening, Balance Training, Safety Education & Training, Self-Care / ADL, Functional Mobility Training  G-Code  OT G-codes  Functional Assessment Tool Used: AM-PAC  Score: 24  Functional Limitation: Self care  Self Care Current Status (): 0 percent impaired, limited or restricted  Self Care Goal Status (): 0 percent impaired, limited or

## 2018-12-10 NOTE — PROGRESS NOTES
Physical Therapy  Facility/Department: [place] [address] TELE/MED SURG/ONC  Daily Treatment Note  NAME: Ruby Duran  : 1950  MRN: [id number]    Date of Service: 12/10/2018    Discharge Recommendations:  24 hour supervision or assist, Home with Home health PT, S Level 1   PT Equipment Recommendations  Equipment Needed: Yes  Mobility Devices: Richarda Noank: Rolling  Other: if unable to borrow from friend    Patient Diagnosis(es): The primary encounter diagnosis was Hypokalemia. Diagnoses of Dehydration, Elevated lactic acid level, Increased ammonia level, Hyponatremia, Elevated troponin, Elevated transaminase level, and Elevated bilirubin were also pertinent to this visit. has a past medical history of Esophagitis and Hypertension. has a past surgical history that includes endoscopic ultrasound (upper). Restrictions  Restrictions/Precautions  Restrictions/Precautions: General Precautions, Fall Risk  Position Activity Restriction  Other position/activity restrictions: High fall risk per nursing assessment, up with assist, IV  Subjective   General  Chart Reviewed: Yes  Response To Previous Treatment: Patient with no complaints from previous session.   Family / Caregiver Present: No  Referring Practitioner: Dr. Felisha Herrera MD  Subjective  Subjective: Pt agreeable to PT  General Comment  Comments: Pt up in chair on approach; RN cleared pt for therapy  Pain Screening  Patient Currently in Pain: Denies       Objective   Bed mobility  Comment: pt up in chair at start and end of session     Transfers  Sit to Stand: Supervision  Stand to sit: Supervision     Ambulation  Ambulation?: Yes  More Ambulation?: Yes  Ambulation 1  Surface: level tile  Device: No Device  Assistance: Stand by assistance  Quality of Gait: Mild unsteadiness with pt reaching for wall to improve balance  Distance: 20 ft  Ambulation 2  Surface - 2: level tile  Device 2: [address] [facility] 2: Stand by assistance  Quality of Gait 2: Cues to maintain KRISHNA within RW. Pt steady with no LOB. Demonstrates decreased step length and foot clearance bilaterally with decreased enrico. Distance: 250 ft  Stairs/Curb  Stairs?: No (pt declined to practice stairs this date. )     Balance  Sitting - Static: Good  Sitting - Dynamic: Good  Standing - Static: Good;-  Standing - Dynamic: Fair;+       Assessment   Body structures, Functions, Activity limitations: Decreased functional mobility ; Decreased endurance;Decreased balance  Assessment: Pt tolerated therapy well. Pt ambulated 250 ft with SBA and RW. Pt reporting she has been ambulating to/from restroom indep with no concerns. Will change d/c recs to home with 24-hr sup. Pt states  is home all of the time. Treatment Diagnosis: decreased mobility  Patient Education: d/c recs, gait  REQUIRES PT FOLLOW UP: Yes  Activity Tolerance  Activity Tolerance: Patient Tolerated treatment well                                AM-PAC Score  AM-PAC Inpatient Mobility Raw Score : 22  AM-PAC Inpatient T-Scale Score : 53.28  Mobility Inpatient CMS 0-100% Score: 20.91  Mobility Inpatient CMS G-Code Modifier : CJ          Goals  Short term goals  Time Frame for Short term goals: 1 week unless specified  Short term goal 1: Pt will transfer supine<>sit with supervision  Short term goal 2: Pt will transfer sit<>stand and bed<>chair with SBA  Short term goal 3: Pt will ambulate 75 ft with RW and CGA  Patient Goals   Patient goals : \"to get stronger\"    Plan    Plan  Times per week: 3-5x/wk  Current Treatment Recommendations: Strengthening, ROM, Balance Training, Endurance Training, Functional Mobility Training, Transfer Training, Gait Training, Home Exercise Program, Safety Education & Training, Neuromuscular Re-education, Stair training  Safety Devices  Type of devices:  All fall risk precautions in place, Call light within reach, Gait belt, Nurse notified, Left in chair     Therapy Time   Individual Concurrent Group Co-treatment Time In 0927         Time Out 0950         Minutes 23         Timed Code Treatment Minutes: [address], [address], [place] #[id number]

## 2018-12-10 NOTE — DISCHARGE INSTR - DIET

## 2018-12-10 NOTE — DISCHARGE SUMMARY
Lab Results   Component Value Date     12/10/2018    K 3.7 12/10/2018    K 3.0 12/06/2018    CL 99 12/10/2018    CO2 27 12/10/2018    BUN 10 12/10/2018    CREATININE <0.5 12/10/2018    CALCIUM 8.3 12/10/2018         Significant Diagnostic Studies    Radiology:   CT Head WO Contrast   Final Result   1. Mild atrophy. Atherosclerotic calcification of the vasculature. Mild   chronic small vessel ischemic white matter disease. Stable right basal   ganglia lacunar infarct. 2. No clear evidence for acute intracranial hemorrhage or midline shift. 3. Overall, stable noncontrast head CT compared with 11/02/2018. CT Cervical Spine WO Contrast   Final Result   No acute abnormality of the cervical spine. Mild degenerative changes lower cervical spine. XR CHEST STANDARD (2 VW)   Final Result   No acute abnormality                Consults:     IP CONSULT TO GI  IP CONSULT TO HOSPITALIST  IP CONSULT TO NEPHROLOGY  IP CONSULT TO CARDIOLOGY    Disposition:  Home     Condition at Discharge: Stable    Discharge Instructions/Follow-up:  GI as direected    Code Status:  Full Code     Activity: activity as tolerated    Diet: Low salt      Discharge Medications:     Current Discharge Medication List           Details   lactulose (CHRONULAC) 10 GM/15ML solution Take 30 mLs by mouth 2 times daily  Qty: 270 mL, Refills: 3      rifaximin (XIFAXAN) 550 MG tablet Take 1 tablet by mouth 2 times daily  Qty: 60 tablet, Refills: 2      pantoprazole (PROTONIX) 40 MG tablet Take 1 tablet by mouth every morning (before breakfast)  Qty: 30 tablet, Refills: 3             Time Spent on discharge is more than 30 minutes in the examination, evaluation, counseling and review of medications and discharge plan. Signed:    Purvi Cisneros MD   12/10/2018      Thank you [address] [place] [address] for the opportunity to be involved in this patient's care.  If you have any questions or concerns please feel free to contact me at [phone].

## 2018-12-10 NOTE — PROGRESS NOTES
Patient is alert and oriented, resting in bed. VSS. Denies pain. Abdomen is distended. Patient calls out appropriately for assistance. Bed locked and in lowest position. Call light and bedside table within reach. Will continue to monitor.

## 2018-12-10 NOTE — PROGRESS NOTES
Hospitalist Progress Note      PCP: Augustine Castro    Date of Admission: 12/6/2018    Chief Complaint:  [address] Course: admitted with acute metabolic encephalopathy, hyponatremia, hypokalemia. Subjective:   Patient is up in chair, comfortable. Doing well today and feels stronger each day. Mentation improved- seems at baseline today   No new event overnight noted. Medications:  Reviewed    Infusion Medications     Scheduled Medications    rifaximin  550 mg Oral BID    lactulose  20 g Oral BID    pantoprazole  40 mg Oral QAM AC    sodium chloride flush  10 mL Intravenous 2 times per day    enoxaparin  40 mg Subcutaneous Daily    pneumococcal 13-valent conjugate  0.5 mL Intramuscular Once     PRN Meds: sodium chloride flush, magnesium hydroxide, potassium chloride **OR** potassium chloride **OR** potassium chloride      Intake/Output Summary (Last 24 hours) at 12/10/18 1233  Last data filed at 12/10/18 1055   Gross per 24 hour   Intake              360 ml   Output              500 ml   Net             -140 ml       Physical Exam Performed:    /74   Pulse 92   Temp 98.3 °F (36.8 °C) (Oral)   Resp 16   Ht 5' 6\" (1.676 m)   Wt 132 lb 0.9 oz (59.9 kg)   SpO2 97%   BMI 21.31 kg/m²     General appearance:  No apparent distress, appears stated age and cooperative. HEENT:  Normal cephalic, atraumatic without obvious deformity. Pupils equal, round,  Extra ocular muscles intact. Icteric. Neck: Supple, with full range of motion. No jugular venous distention. Trachea midline. Respiratory:  Normal respiratory effort. Clear to auscultation, bilaterally without Rales/Wheezes/Rhonchi. Cardiovascular:  Regular rate and rhythm with normal S1/S2 without murmurs, rubs or gallops. Abdomen: Soft, mildly distended, non-tender, non-distended with normal bowel sounds. No tense ascites   Musculoskeletal:  No clubbing, cyanosis. bilat pedal edema R>L( chronic per pt due to varicose veins) .  No calf

## 2018-12-10 NOTE — PLAN OF CARE
Problem: Falls - Risk of:  Goal: Will remain free from falls  Will remain free from falls   Outcome: Ongoing  Pt remains free of falls. Alert and oriented, uses call light appropriately and ambulates independently. Bed locked and in lowest position. Bedside table and call light within reach. Will continue to monitor.

## 2018-12-11 LAB — LIVER-KIDNEY MICROSOME-1 AB IGG: 1.1 U (ref 0–24.9)

## 2018-12-12 LAB
AFP: 9.8 UG/L
SPE/IFE INTERPRETATION: NORMAL

## 2019-01-05 PROBLEM — R77.8 ELEVATED TROPONIN: Status: RESOLVED | Noted: 2018-12-06 | Resolved: 2019-01-05

## 2019-03-11 ENCOUNTER — OFFICE VISIT (OUTPATIENT)
Dept: ORTHOPEDIC SURGERY | Age: 69
End: 2019-03-11
Payer: MEDICARE

## 2019-03-11 VITALS
HEART RATE: 88 BPM | WEIGHT: 132.06 LBS | BODY MASS INDEX: 21.22 KG/M2 | HEIGHT: 66 IN | DIASTOLIC BLOOD PRESSURE: 78 MMHG | SYSTOLIC BLOOD PRESSURE: 140 MMHG

## 2019-03-11 DIAGNOSIS — M40.204 KYPHOSIS OF THORACIC REGION, UNSPECIFIED KYPHOSIS TYPE: ICD-10-CM

## 2019-03-11 DIAGNOSIS — M51.34 DDD (DEGENERATIVE DISC DISEASE), THORACIC: ICD-10-CM

## 2019-03-11 DIAGNOSIS — G25.89 SCAPULAR DYSKINESIS: Primary | ICD-10-CM

## 2019-03-11 PROCEDURE — G8427 DOCREV CUR MEDS BY ELIG CLIN: HCPCS | Performed by: FAMILY MEDICINE

## 2019-03-11 PROCEDURE — G8400 PT W/DXA NO RESULTS DOC: HCPCS | Performed by: FAMILY MEDICINE

## 2019-03-11 PROCEDURE — 1090F PRES/ABSN URINE INCON ASSESS: CPT | Performed by: FAMILY MEDICINE

## 2019-03-11 PROCEDURE — 1123F ACP DISCUSS/DSCN MKR DOCD: CPT | Performed by: FAMILY MEDICINE

## 2019-03-11 PROCEDURE — 4040F PNEUMOC VAC/ADMIN/RCVD: CPT | Performed by: FAMILY MEDICINE

## 2019-03-11 PROCEDURE — 3017F COLORECTAL CA SCREEN DOC REV: CPT | Performed by: FAMILY MEDICINE

## 2019-03-11 PROCEDURE — 1036F TOBACCO NON-USER: CPT | Performed by: FAMILY MEDICINE

## 2019-03-11 PROCEDURE — 99213 OFFICE O/P EST LOW 20 MIN: CPT | Performed by: FAMILY MEDICINE

## 2019-03-11 PROCEDURE — G8484 FLU IMMUNIZE NO ADMIN: HCPCS | Performed by: FAMILY MEDICINE

## 2019-03-11 PROCEDURE — 1101F PT FALLS ASSESS-DOCD LE1/YR: CPT | Performed by: FAMILY MEDICINE

## 2019-03-11 PROCEDURE — G8420 CALC BMI NORM PARAMETERS: HCPCS | Performed by: FAMILY MEDICINE

## 2019-03-11 RX ORDER — CYCLOBENZAPRINE HCL 10 MG
TABLET ORAL
Qty: 40 TABLET | Refills: 0 | Status: SHIPPED | OUTPATIENT
Start: 2019-03-11

## 2019-03-19 LAB
ALBUMIN SERPL-MCNC: 2.3 G/DL (ref 3.1–4.9)
ALPHA-1-GLOBULIN: 0.3 G/DL (ref 0.2–0.4)
ALPHA-2-GLOBULIN: 0.9 G/DL (ref 0.4–1.1)
BETA GLOBULIN: 0.9 G/DL (ref 0.9–1.6)
GAMMA GLOBULIN: 1.7 G/DL (ref 0.6–1.8)
M SPIKE 1 SERUM PROTEIN ELEC: 1 G/DL

## 2019-03-26 ENCOUNTER — HOSPITAL ENCOUNTER (OUTPATIENT)
Dept: PHYSICAL THERAPY | Age: 69
Setting detail: THERAPIES SERIES
Discharge: HOME | End: 2019-03-26
Payer: MEDICARE

## 2019-03-26 PROCEDURE — 97161 PT EVAL LOW COMPLEX 20 MIN: CPT

## 2019-03-26 PROCEDURE — G8982 BODY POS GOAL STATUS: HCPCS

## 2019-03-26 PROCEDURE — 97110 THERAPEUTIC EXERCISES: CPT

## 2019-03-26 PROCEDURE — G8981 BODY POS CURRENT STATUS: HCPCS

## 2019-03-29 ENCOUNTER — APPOINTMENT (OUTPATIENT)
Dept: PHYSICAL THERAPY | Age: 69
End: 2019-03-29
Payer: MEDICARE

## 2019-04-01 ENCOUNTER — HOSPITAL ENCOUNTER (OUTPATIENT)
Dept: PHYSICAL THERAPY | Age: 69
Setting detail: THERAPIES SERIES
End: 2019-04-01
Payer: MEDICARE

## 2019-04-05 ENCOUNTER — APPOINTMENT (OUTPATIENT)
Dept: PHYSICAL THERAPY | Age: 69
End: 2019-04-05
Payer: MEDICARE

## 2019-04-09 ENCOUNTER — APPOINTMENT (OUTPATIENT)
Dept: PHYSICAL THERAPY | Age: 69
End: 2019-04-09
Payer: MEDICARE

## 2019-04-10 ENCOUNTER — HOSPITAL ENCOUNTER (OUTPATIENT)
Dept: PHYSICAL THERAPY | Age: 69
Setting detail: THERAPIES SERIES
Discharge: HOME | End: 2019-04-10
Payer: MEDICARE

## 2019-04-10 PROCEDURE — G0283 ELEC STIM OTHER THAN WOUND: HCPCS | Performed by: PHYSICAL THERAPIST

## 2019-04-10 PROCEDURE — 97110 THERAPEUTIC EXERCISES: CPT | Performed by: PHYSICAL THERAPIST

## 2019-04-10 PROCEDURE — 97161 PT EVAL LOW COMPLEX 20 MIN: CPT | Performed by: PHYSICAL THERAPIST

## 2019-04-10 PROCEDURE — 97140 MANUAL THERAPY 1/> REGIONS: CPT | Performed by: PHYSICAL THERAPIST

## 2019-04-10 NOTE — FLOWSHEET NOTE
[facility],  [address] [place]  Phone: [phone]  Fax [phone]      Physical Therapy Daily Treatment Note  Date:  4/10/2019    Patient Name:  Ernie Peterson    :  1950  MRN: [id number]  Restrictions/Precautions:    Medical/Treatment Diagnosis Information:  · Diagnosis: Scapular dyskinesis (G25.89), thoracic kyphosis (M40.204)  · Treatment Diagnosis: Abnormal posture (R29.3), Weakness (R53.1), Thoracic Pain F00.6   Insurance/Certification information:  PT Insurance Information: [facility]  Physician Information:  Referring Practitioner: Dr Katelyn Adam of care signed (Y/N):     Date of Patient follow up with Physician:     G-Code (if applicable):      Date G-Code Applied:    PT G-Codes  Functional Assessment Tool Used: Chauncey Calix  Score: 55%    Progress Note: [x]  Yes  []  No  Next due by: Visit #10      Latex Allergy:  [x]NO      []YES  Preferred Language for Healthcare:   [x]English       []other:    Visit # Insurance Allowable Requires auth   1 Medicare    [x]no        []yes:     Pain level:  8/10     SUBJECTIVE:  See eval    OBJECTIVE: See eval  Observation:   Test measurements:      RESTRICTIONS/PRECAUTIONS: hx of recent hospitalization from pneumonia, chirrosis, metabolic encephalopathy, significant weight loss         Exercises/Interventions:   Therapeutic Ex Sets/rep comments   Scap squeezes 5\" x 10 Needs cueing; gentle   Seated chin tucks 5\" x 10    Seated TA with ADD squeeze 5\" x 10    Seated heel raises 15x                             Patient ed  HEP, POC, dry needling and contraindications/precautions (patietn not appropriate at this time), posture, causes of t spine pain and importance of scapular strengthening                                                Manual Intervention     STM and TP release to thoracic paraspinals, middle trap, rhomboid, upper trap 13'                                  NMR re-education                                                 Therapeutic Exercise and NMR EXR  [x] (69736) Provided verbal/tactile cueing for activities related to strengthening, flexibility, endurance, ROM  for improvements in scapular, scapulothoracic and UE control with self care, reaching, carrying, lifting, house/yardwork, driving/computer work.    [] (30066) Provided verbal/tactile cueing for activities related to improving balance, coordination, kinesthetic sense, posture, motor skill, proprioception  to assist with  scapular, scapulothoracic and UE control with self care, reaching, carrying, lifting, house/yardwork, driving/computer work. Therapeutic Activities:    [] (76324 or 37071) Provided verbal/tactile cueing for activities related to improving balance, coordination, kinesthetic sense, posture, motor skill, proprioception and motor activation to allow for proper function of scapular, scapulothoracic and UE control with self care, carrying, lifting, driving/computer work.      Home Exercise Program:    [x] (03953) Reviewed/Progressed HEP activities related to strengthening, flexibility, endurance, ROM of scapular, scapulothoracic and UE control with self care, reaching, carrying, lifting, house/yardwork, driving/computer work  [] (21761) Reviewed/Progressed HEP activities related to improving balance, coordination, kinesthetic sense, posture, motor skill, proprioception of scapular, scapulothoracic and UE control with self care, reaching, carrying, lifting, house/yardwork, driving/computer work      Manual Treatments:  PROM / STM / Oscillations-Mobs:  G-I, II, III, IV (PA's, Inf., Post.)  [x] (77077) Provided manual therapy to mobilize soft tissue/joints of cervical/CT, scapular GHJ and UE for the purpose of modulating pain, promoting relaxation,  increasing ROM, reducing/eliminating soft tissue swelling/inflammation/restriction, improving soft tissue extensibility and allowing for proper ROM for

## 2019-04-10 NOTE — PLAN OF CARE
[facility], [address] [place] [address]  Phone: [phone]  Fax [phone]     Physical Therapy Certification    Dear Referring Practitioner: Dr Brandy Moya,    We had the pleasure of evaluating the following patient for physical therapy services at [address] [place] [address]. A summary of our findings can be found in the initial assessment below. This includes our plan of care. If you have any questions or concerns regarding these findings, please do not hesitate to contact me at the office phone number checked above. Thank you for the referral.       Physician Signature:_______________________________Date:__________________  By signing above (or electronic signature), therapists plan is approved by physician    Patient: Stanley Soni   : 1950   MRN: [id number]  Referring Physician: Referring Practitioner: Dr Brandy Moya      Evaluation Date: 4/10/2019      Medical Diagnosis Information:  Diagnosis: Scapular dyskinesis (G25.89), thoracic kyphosis (M40.204)   Treatment Diagnosis: Abnormal posture (R29.3), Weakness (R53.1), Thoracic Pain M54.6                                          Insurance information: PT Insurance Information:  [employer]     Precautions/ Contra-indications:   Latex Allergy:  [x]NO      []YES  Preferred Language for Healthcare:   [x]English       []other:    SUBJECTIVE: Patient stated complaint: Patient reports she has been having pain by her left shoulder blade with no specific ALTAGRACIA. Reports that the was hospitalized with metabolic encephalopathy earlier this year and has lost 30 pounds. Reports that all of that is cleared up, but has been having back spasms that are keeping her on the couch. Reports that she cannot stand for 10 minutes because once she sits down, she knows it will spasm.     Relevant Medical History: hx of recent hospitalization from pneumonia, chirrosis, metabolic encephalopathy, Arthritic conditions   []Rheumatoid arthritis (M05.9)  []Osteoarthritis (M19.91)   Cardiovascular conditions   []Hypertension (I10)  []Hyperlipidemia (E78.5)  []Angina pectoris (I20)  []Atherosclerosis (I70)   Musculoskeletal conditions   []Disc pathology   []Congenital spine pathologies   []Prior surgical intervention  []Osteoporosis (M81.8)  []Osteopenia (M85.8)   Endocrine conditions   []Hypothyroid (E03.9)  []Hyperthyroid Gastrointestinal conditions   []Constipation (C56.18)   Metabolic conditions   []Morbid obesity (E66.01)  []Diabetes type 1(E10.65) or 2 (E11.65)   []Neuropathy (G60.9)     Pulmonary conditions   []Asthma (J45)  []Coughing   []COPD (J44.9)   Psychological Disorders  [x]Anxiety (F41.9)  [x]Depression (F32.9)   []Other:   [x]Other:  Recent hospitalization with metabolic encephalopathy and significant weight loss       Barriers to/and or personal factors that will affect rehab potential:              [x]Age  []Sex              []Motivation/Lack of Motivation                        [x]Co-Morbidities              []Cognitive Function, education/learning barriers              []Environmental, home barriers              []profession/work barriers  []past PT/medical experience  []other:  Justification:      Falls Risk Assessment (30 days):   [x] Falls Risk assessed and no intervention required. [] Falls Risk assessed and Patient requires intervention due to being higher risk   TUG score (>12s at risk):     [] Falls education provided, including       G-Codes:  PT G-Codes  Functional Assessment Tool Used: Akira Melendezan  Score: 55%    ASSESSMENT: Patient demonstrates decreased ROM in her shoulders and spine, and decreased shoulder and scapular strength, limiting her ability to complete ADLs without pain. Will benefit from skilled PT to address limitations.     Functional Impairments   [x]Noted spinal or UE joint hypomobility   []Noted spinal or UE joint hypermobility   [x]Decreased UE functional ROM   [x]Decreased UE functional strength   []Abnormal reflexes/sensation/myotomal/dermatomal deficits   [x]Decreased RC/scapular/core strength and neuromuscular control   []other:      Functional Activity Limitations (from functional questionnaire and intake)   [x]Reduced ability to tolerate prolonged functional positions   [x]Reduced ability or difficulty with changes of positions or transfers between positions   [x]Reduced ability to maintain good posture and demonstrate good body mechanics with sitting, bending, and lifting   [x] Reduced ability or tolerance with driving and/or computer work   []Reduced ability to sleep   [x]Reduced ability to perform lifting, reaching, carrying tasks   [x]Reduced ability to tolerate impact through UE   []Reduced ability to reach behind back   []Reduced ability to  or hold objects   []Reduced ability to throw or toss an object   []other:    Participation Restrictions   []Reduced participation in self care activities   [x]Reduced participation in home management activities   [x]Reduced participation in work activities   [x]Reduced participation in social activities. [x]Reduced participation in sport/recreation activities. Classification:   []Signs/symptoms consistent with post-surgical status including decreased ROM, strength and function.   []Signs/symptoms consistent with joint sprain/strain   []Signs/symptoms consistent with shoulder impingement   []Signs/symptoms consistent with shoulder/elbow/wrist tendinopathy   []Signs/symptoms consistent with Rotator cuff tear   []Signs/symptoms consistent with labral tear   [x]Signs/symptoms consistent with postural dysfunction    []Signs/symptoms consistent with Glenohumeral IR Deficit - <45 degrees   []Signs/symptoms consistent with facet dysfunction of cervical/thoracic spine    []Signs/symptoms consistent with pathology which may benefit from Dry needling     []other:     Prognosis/Rehab Potential: []Excellent   []Good    [x]Fair   []Poor    Tolerance of evaluation/treatment:    []Excellent   []Good    [x]Fair   []Poor  Physical Therapy Evaluation Complexity Justification  [x] A history of present problem with:  [] no personal factors and/or comorbidities that impact the plan of care;  []1-2 personal factors and/or comorbidities that impact the plan of care  [x]3 personal factors and/or comorbidities that impact the plan of care  [x] An examination of body systems using standardized tests and measures addressing any of the following: body structures and functions (impairments), activity limitations, and/or participation restrictions;:  [] a total of 1-2 or more elements   [] a total of 3 or more elements   [x] a total of 4 or more elements   [x] A clinical presentation with:  [x] stable and/or uncomplicated characteristics   [] evolving clinical presentation with changing characteristics  [] unstable and unpredictable characteristics;   [x] Clinical decision making of [x] low, [] moderate, [] high complexity using standardized patient assessment instrument and/or measurable assessment of functional outcome. [x] EVAL (LOW) 46548 (typically 20 minutes face-to-face)  [] EVAL (MOD) 15188 (typically 30 minutes face-to-face)  [] EVAL (HIGH) 05373 (typically 45 minutes face-to-face)  [] RE-EVAL       PLAN:  Frequency/Duration:  2 days per week for 6 Weeks:  INTERVENTIONS:  [x] Therapeutic exercise including: strength training, ROM, for Upper extremity and core   [x]  NMR activation and proprioception for UE, scap and Core   [x] Manual therapy as indicated for shoulder, scapula and spine to include: Dry Needling/IASTM, STM, PROM, Gr I-IV mobilizations, manipulation. [x] Modalities as needed that may include: thermal agents, E-stim, Biofeedback, US, iontophoresis as indicated  [x] Patient education on joint protection, postural re-education, activity modification, progression of HEP.     HEP instruction: (see scanned forms)    GOALS:  Patient stated goal: \"To get rid of the pain in my back so I can exercise. \"    Therapist goals for Patient:   Short Term Goals: To be achieved in: 2 weeks  1. Independent in HEP and progression per patient tolerance, in order to prevent re-injury. 2. Patient will have a decrease in pain to facilitate improvement in movement, function, and ADLs as indicated by Functional Deficits. Long Term Goals: To be achieved in: 6 weeks  1. Disability index score of 27% or less for the [facility] to assist with reaching prior level of function. 2. Patient will demonstrate increased AROM of bilateral shoulder flexion to 145 degrees and cervical rotation to 45 degrees to allow for proper joint functioning as indicated by patients Functional Deficits. 3. Patient will demonstrate an increase in Strength in the left shoulder to grossly 4/5 and middle trap to 4-/5 to allow for proper functional mobility as indicated by patients Functional Deficits. 4. Patient will be able to stand for 30 minutes without increased symptoms or restriction.    5. Patient will be able to return to an exercise routine in order to get back to her PLOF. (patient specific functional goal)         Electronically signed by:  Brea Cornell, [address] [facility], DPT [id number]

## 2019-04-12 ENCOUNTER — APPOINTMENT (OUTPATIENT)
Dept: PHYSICAL THERAPY | Age: 69
End: 2019-04-12
Payer: MEDICARE

## 2019-04-16 ENCOUNTER — HOSPITAL ENCOUNTER (OUTPATIENT)
Dept: PHYSICAL THERAPY | Age: 69
Setting detail: THERAPIES SERIES
Discharge: HOME | End: 2019-04-16
Payer: MEDICARE

## 2019-04-16 PROCEDURE — 97140 MANUAL THERAPY 1/> REGIONS: CPT | Performed by: PHYSICAL THERAPIST

## 2019-04-16 PROCEDURE — 97110 THERAPEUTIC EXERCISES: CPT | Performed by: PHYSICAL THERAPIST

## 2019-04-16 PROCEDURE — G0283 ELEC STIM OTHER THAN WOUND: HCPCS | Performed by: PHYSICAL THERAPIST

## 2019-04-16 NOTE — FLOWSHEET NOTE
[facility],  [address]  Phone: [phone]  Fax [phone]      Physical Therapy Daily Treatment Note  Date:  2019    Patient Name:  Jose J Orellana    :  1950  MRN: [id number]  Restrictions/Precautions:    Medical/Treatment Diagnosis Information:  · Diagnosis: Scapular dyskinesis (G25.89), thoracic kyphosis (M40.204)  · Treatment Diagnosis: Abnormal posture (R29.3), Weakness (R53.1), Thoracic Pain A92.6   Insurance/Certification information:  PT Insurance Information:  W Malik Camara  Physician Information:  Referring Practitioner: Dr Quincy Chiu of care signed (Y/N): Yes    Date of Patient follow up with Physician:     G-Code (if applicable):      Date G-Code Applied:    PT G-Codes  Functional Assessment Tool Used: Thersa Scales  Score: 55%    Progress Note: [x]  Yes  []  No  Next due by: Visit #10      Latex Allergy:  [x]NO      []YES  Preferred Language for Healthcare:   [x]English       []other:    Visit # Insurance Allowable Requires auth   2 Medicare    [x]no        []yes:     Pain level:  7-8/10     SUBJECTIVE:  Patient reports she is doing a little better. Stil gets pain, but does not seem to be as bad. Was not as sore as she thought she would be after last session.      OBJECTIVE:   Observation:  Test measurements:  NT this date    RESTRICTIONS/PRECAUTIONS: hx of recent hospitalization from pneumonia, chirrosis, metabolic encephalopathy, significant weight loss         Exercises/Interventions:   Therapeutic Ex Sets/rep comments   Scap squeezes 5\" x 10 Needs cueing; gentle   Seated chin tucks 5\" x 10    Seated TA with ADD squeeze 5\" x 10       Seated TA with Clam [place] TB 10 x 5\"         Finisher ladders  Finisher 3D 10 x 3\" ea    Standing no $ 10 x 3\"    Wall slides with gentle scap squeeze + chin tuck 5 x 3\" ea R/:         Patient ed  Posture, importance of strengthening scapular muscle and core to support spine, gradual progression of activity                                                Manual Intervention     STM and TP release to thoracic and lumbar paraspinals, middle trap, rhomboid, upper trap 23'                                  NMR re-education                                                 Therapeutic Exercise and NMR EXR  [x] (87858) Provided verbal/tactile cueing for activities related to strengthening, flexibility, endurance, ROM  for improvements in scapular, scapulothoracic and UE control with self care, reaching, carrying, lifting, house/yardwork, driving/computer work.    [] (14711) Provided verbal/tactile cueing for activities related to improving balance, coordination, kinesthetic sense, posture, motor skill, proprioception  to assist with  scapular, scapulothoracic and UE control with self care, reaching, carrying, lifting, house/yardwork, driving/computer work. Therapeutic Activities:    [] (08871 or 86539) Provided verbal/tactile cueing for activities related to improving balance, coordination, kinesthetic sense, posture, motor skill, proprioception and motor activation to allow for proper function of scapular, scapulothoracic and UE control with self care, carrying, lifting, driving/computer work.      Home Exercise Program:    [x] (40349) Reviewed/Progressed HEP activities related to strengthening, flexibility, endurance, ROM of scapular, scapulothoracic and UE control with self care, reaching, carrying, lifting, house/yardwork, driving/computer work  [] (45038) Reviewed/Progressed HEP activities related to improving balance, coordination, kinesthetic sense, posture, motor skill, proprioception of scapular, scapulothoracic and UE control with self care, reaching, carrying, lifting, house/yardwork, driving/computer work      Manual Treatments:  PROM / STM / Oscillations-Mobs:  G-I, II, III, IV (PA's, Inf., Post.)  [x] (60405) Provided manual therapy to mobilize soft tissue/joints of

## 2019-04-18 ENCOUNTER — HOSPITAL ENCOUNTER (OUTPATIENT)
Dept: PHYSICAL THERAPY | Age: 69
Setting detail: THERAPIES SERIES
Discharge: HOME | End: 2019-04-18
Payer: MEDICARE

## 2019-04-18 PROCEDURE — 97110 THERAPEUTIC EXERCISES: CPT | Performed by: PHYSICAL THERAPIST

## 2019-04-18 PROCEDURE — G0283 ELEC STIM OTHER THAN WOUND: HCPCS | Performed by: PHYSICAL THERAPIST

## 2019-04-18 PROCEDURE — 97140 MANUAL THERAPY 1/> REGIONS: CPT | Performed by: PHYSICAL THERAPIST

## 2019-04-18 NOTE — FLOWSHEET NOTE
[facility],  [address] [place]  Phone: [phone]  Fax [phone]      Physical Therapy Daily Treatment Note  Date:  2019    Patient Name:  Jose J Orellana    :  1950  MRN: [id number]  Restrictions/Precautions:    Medical/Treatment Diagnosis Information:  · Diagnosis: Scapular dyskinesis (G25.89), thoracic kyphosis (M40.204)  · Treatment Diagnosis: Abnormal posture (R29.3), Weakness (R53.1), Thoracic Pain P56.5   Insurance/Certification information:  PT Insurance Information:  W Malik Camara  Physician Information:  Referring Practitioner: Dr Quincy Chiu of care signed (Y/N): Yes    Date of Patient follow up with Physician:     G-Code (if applicable):      Date G-Code Applied:    PT G-Codes  Functional Assessment Tool Used: Thersa Scales  Score: 55%    Progress Note: [x]  Yes  []  No  Next due by: Visit #10      Latex Allergy:  [x]NO      []YES  Preferred Language for Healthcare:   [x]English       []other:    Visit # Insurance Allowable Requires auth   3 Medicare    [x]no        []yes:     Pain level:  5-7/10     SUBJECTIVE:  Patient reports she is sore today, but overall feels like she can do a little more without the pain coming on.      OBJECTIVE:   Observation:  Test measurements:  NT this date    RESTRICTIONS/PRECAUTIONS: hx of recent hospitalization from pneumonia, chirrosis, metabolic encephalopathy, significant weight loss         Exercises/Interventions:   Therapeutic Ex Sets/rep comments   Scap squeezes 5\" x 15 Needs cueing; gentle   HL chin tucks 5\" x 10    HL TA with ADD squeeze 5\" x 10       HLTA with Clam [place] TB 10 x 5\"         Finisher ladders  Finisher 3D 15 x 3\" ea    Standing no $ 15 x 3\"    Wall slides with gentle scap squeeze + chin tuck 5 x 3\" ea R/L         Pulleys 15x ea R/L         True stretch t spine 10 x 10\"                                       Manual Intervention     STM and TP release to thoracic and lumbar paraspinals, middle trap, rhomboid, upper trap 16'                                  NMR re-education                                                 Therapeutic Exercise and NMR EXR  [x] (58128) Provided verbal/tactile cueing for activities related to strengthening, flexibility, endurance, ROM  for improvements in scapular, scapulothoracic and UE control with self care, reaching, carrying, lifting, house/yardwork, driving/computer work.    [] (39587) Provided verbal/tactile cueing for activities related to improving balance, coordination, kinesthetic sense, posture, motor skill, proprioception  to assist with  scapular, scapulothoracic and UE control with self care, reaching, carrying, lifting, house/yardwork, driving/computer work. Therapeutic Activities:    [] (52636 or 22421) Provided verbal/tactile cueing for activities related to improving balance, coordination, kinesthetic sense, posture, motor skill, proprioception and motor activation to allow for proper function of scapular, scapulothoracic and UE control with self care, carrying, lifting, driving/computer work.      Home Exercise Program:    [x] (10088) Reviewed/Progressed HEP activities related to strengthening, flexibility, endurance, ROM of scapular, scapulothoracic and UE control with self care, reaching, carrying, lifting, house/yardwork, driving/computer work  [] (82387) Reviewed/Progressed HEP activities related to improving balance, coordination, kinesthetic sense, posture, motor skill, proprioception of scapular, scapulothoracic and UE control with self care, reaching, carrying, lifting, house/yardwork, driving/computer work      Manual Treatments:  PROM / STM / Oscillations-Mobs:  G-I, II, III, IV (PA's, Inf., Post.)  [x] (21572) Provided manual therapy to mobilize soft tissue/joints of cervical/CT, scapular GHJ and UE for the purpose of modulating pain, promoting relaxation,  increasing ROM, reducing/eliminating soft tissue swelling/inflammation/restriction, improving soft tissue extensibility and allowing for proper ROM for normal function with self care, reaching, carrying, lifting, house/yardwork, driving/computer work    Modalities:  IFC/HP x 15' to left parascapular region    Charges:  Timed Code Treatment Minutes: 40'   Total Treatment Minutes: 54'     [] EVAL (LOW) 63562 (typically 20 minutes face-to-face)  [] EVAL (MOD) 82701 (typically 30 minutes face-to-face)  [] EVAL (HIGH) 97903 (typically 45 minutes face-to-face)  [] RE-EVAL     [x] AY(54237) x  2   [] IONTO  [] NMR (28140) x      [] VASO  [x] Manual (04305) x  1    [] Other:  [] TA x       [] Mech Traction (67279)  [] ES(attended) (83672)      [x] ES (un) (19165):     GOALS:  Short Term Goals: To be achieved in: 2 weeks  1. Independent in HEP and progression per patient tolerance, in order to prevent re-injury. 2. Patient will have a decrease in pain to facilitate improvement in movement, function, and ADLs as indicated by Functional Deficits.     Long Term Goals: To be achieved in: 6 weeks  1. Disability index score of 27% or less for the [facility] to assist with reaching prior level of function. 2. Patient will demonstrate increased AROM of bilateral shoulder flexion to 145 degrees and cervical rotation to 45 degrees to allow for proper joint functioning as indicated by patients Functional Deficits. 3. Patient will demonstrate an increase in Strength in the left shoulder to grossly 4/5 and middle trap to 4-/5 to allow for proper functional mobility as indicated by patients Functional Deficits. 4. Patient will be able to stand for 30 minutes without increased symptoms or restriction. 5. Patient will be able to return to an exercise routine in order to get back to her PLOF. (patient specific functional goal)              Progression Towards Functional goals:  [x] Patient is progressing as expected towards functional goals listed.     [] Progression is slowed due to complexities listed. [] Progression has been slowed due to co-morbidities. [] Plan just implemented, too soon to assess goals progression  [] Other:     ASSESSMENT:  Patient fatigued and reported soreness near end of session. Is slowly able to tolerate more activity.     Treatment/Activity Tolerance:  [] Patient tolerated treatment well [] Patient limited by fatique  [x] Patient limited by pain  [] Patient limited by other medical complications  [] Other:     Prognosis: [] Good [x] Fair  [] Poor    Patient Requires Follow-up: [x] Yes  [] No    PLAN: See eval  [x] Continue per plan of care [] Alter current plan (see comments)  [] Plan of care initiated [] Hold pending MD visit [] Discharge    Electronically signed by: Cristina Stevenson, [address] [facility], DPT [id number]

## 2019-04-23 ENCOUNTER — HOSPITAL ENCOUNTER (OUTPATIENT)
Dept: PHYSICAL THERAPY | Age: 69
Setting detail: THERAPIES SERIES
End: 2019-04-23
Payer: MEDICARE

## 2019-04-25 ENCOUNTER — APPOINTMENT (OUTPATIENT)
Dept: PHYSICAL THERAPY | Age: 69
End: 2019-04-25
Payer: MEDICARE

## 2019-04-30 ENCOUNTER — APPOINTMENT (OUTPATIENT)
Dept: PHYSICAL THERAPY | Age: 69
End: 2019-04-30
Payer: MEDICARE

## 2019-05-02 ENCOUNTER — HOSPITAL ENCOUNTER (OUTPATIENT)
Dept: PHYSICAL THERAPY | Age: 69
Setting detail: THERAPIES SERIES
Discharge: HOME | End: 2019-05-02
Payer: MEDICARE

## 2019-05-02 PROCEDURE — 97140 MANUAL THERAPY 1/> REGIONS: CPT | Performed by: PHYSICAL THERAPIST

## 2019-05-02 PROCEDURE — 97110 THERAPEUTIC EXERCISES: CPT | Performed by: PHYSICAL THERAPIST

## 2019-05-02 PROCEDURE — G0283 ELEC STIM OTHER THAN WOUND: HCPCS | Performed by: PHYSICAL THERAPIST

## 2019-05-02 NOTE — FLOWSHEET NOTE
[facility],  [address] [place]  Phone: [phone]  Fax [phone]      Physical Therapy Daily Treatment Note  Date:  2019    Patient Name:  Stanley Soni    :  1950  MRN: [id number]  Restrictions/Precautions:    Medical/Treatment Diagnosis Information:  · Diagnosis: Scapular dyskinesis (G25.89), thoracic kyphosis (M40.204)  · Treatment Diagnosis: Abnormal posture (R29.3), Weakness (R53.1), Thoracic Pain I72.3   Insurance/Certification information:  PT Insurance Information: [facility]  Physician Information:  Referring Practitioner: Dr Zoie Vallejo of care signed (Y/N): Yes    Date of Patient follow up with Physician:     G-Code (if applicable):      Date G-Code Applied:    PT G-Codes  Functional Assessment Tool Used: Ramiro Orozco  Score: 55%    Progress Note: [x]  Yes  []  No  Next due by: Visit #10      Latex Allergy:  [x]NO      []YES  Preferred Language for Healthcare:   [x]English       []other:    Visit # Insurance Allowable Requires auth   4 Medicare    [x]no        []yes:     Pain level:  5-7/10     SUBJECTIVE:  Patient reports she had to miss an appointment because she was sick, and then last week she was in quite a bit of pain so she laid on the couch a lot. Seeing the pain management doctor next week. Does think PT is helping.    OBJECTIVE:   Observation:  Test measurements:  NT this date    RESTRICTIONS/PRECAUTIONS: hx of recent hospitalization from pneumonia, chirrosis, metabolic encephalopathy, significant weight loss         Exercises/Interventions:   Therapeutic Ex Sets/rep comments   Prone Scap squeezes 2 x 10 x 3\" Needs cueing; gentle                    Finisher ladders  Finisher 3D  Circles  15 x 3\" ea    Standing no $ 20 x 3\"    Wall slides with gentle scap squeeze + chin tuck flexion and scaption 5 x 3\" ea R/L                 True stretch t spine 10 x 10\"              TB rows 2 x 10 x 3\"    TB ext 2 x 10 x 3\"    Wall push ups 2 x 10         Patient ed  Posture, continuing to stretch and strengthen postural muscles versus laying around for an entire week when spasms occur   Manual Intervention     STM and TP release to thoracic and lumbar paraspinals, middle trap, rhomboid, upper trap 15'                                  NMR re-education                                                 Therapeutic Exercise and NMR EXR  [x] (28485) Provided verbal/tactile cueing for activities related to strengthening, flexibility, endurance, ROM  for improvements in scapular, scapulothoracic and UE control with self care, reaching, carrying, lifting, house/yardwork, driving/computer work.    [] (79555) Provided verbal/tactile cueing for activities related to improving balance, coordination, kinesthetic sense, posture, motor skill, proprioception  to assist with  scapular, scapulothoracic and UE control with self care, reaching, carrying, lifting, house/yardwork, driving/computer work. Therapeutic Activities:    [] (69385 or 95111) Provided verbal/tactile cueing for activities related to improving balance, coordination, kinesthetic sense, posture, motor skill, proprioception and motor activation to allow for proper function of scapular, scapulothoracic and UE control with self care, carrying, lifting, driving/computer work.      Home Exercise Program:    [x] (42405) Reviewed/Progressed HEP activities related to strengthening, flexibility, endurance, ROM of scapular, scapulothoracic and UE control with self care, reaching, carrying, lifting, house/yardwork, driving/computer work  [] (80137) Reviewed/Progressed HEP activities related to improving balance, coordination, kinesthetic sense, posture, motor skill, proprioception of scapular, scapulothoracic and UE control with self care, reaching, carrying, lifting, house/yardwork, driving/computer work      Manual Treatments:  PROM / STM / Oscillations-Mobs:  G-I, II, III, IV (PA's, Inf., Post.)  [x] (19228) Provided manual therapy to mobilize soft tissue/joints of cervical/CT, scapular GHJ and UE for the purpose of modulating pain, promoting relaxation,  increasing ROM, reducing/eliminating soft tissue swelling/inflammation/restriction, improving soft tissue extensibility and allowing for proper ROM for normal function with self care, reaching, carrying, lifting, house/yardwork, driving/computer work    Modalities:  IFC/HP x 15' to left parascapular region    Charges:  Timed Code Treatment Minutes: 38'   Total Treatment Minutes: 48'     [] EVAL (LOW) 97317 (typically 20 minutes face-to-face)  [] EVAL (MOD) 20158 (typically 30 minutes face-to-face)  [] EVAL (HIGH) 47704 (typically 45 minutes face-to-face)  [] RE-EVAL     [x] HC(22047) x  1   [] IONTO  [] NMR (97088) x      [] VASO  [x] Manual (71058) x  1    [] Other:  [] TA x       [] Mech Traction (87429)  [] ES(attended) (86754)      [x] ES (un) (95134):     GOALS:  Short Term Goals: To be achieved in: 2 weeks  1. Independent in HEP and progression per patient tolerance, in order to prevent re-injury. 2. Patient will have a decrease in pain to facilitate improvement in movement, function, and ADLs as indicated by Functional Deficits.     Long Term Goals: To be achieved in: 6 weeks  1. Disability index score of 27% or less for the [facility] to assist with reaching prior level of function. 2. Patient will demonstrate increased AROM of bilateral shoulder flexion to 145 degrees and cervical rotation to 45 degrees to allow for proper joint functioning as indicated by patients Functional Deficits. 3. Patient will demonstrate an increase in Strength in the left shoulder to grossly 4/5 and middle trap to 4-/5 to allow for proper functional mobility as indicated by patients Functional Deficits. 4. Patient will be able to stand for 30 minutes without increased symptoms or restriction.    5. Patient will be able to return to an exercise routine

## 2019-05-09 ENCOUNTER — APPOINTMENT (OUTPATIENT)
Dept: PHYSICAL THERAPY | Age: 69
End: 2019-05-09
Payer: MEDICARE

## 2019-05-13 ENCOUNTER — APPOINTMENT (OUTPATIENT)
Dept: PHYSICAL THERAPY | Age: 69
End: 2019-05-13
Payer: MEDICARE

## 2019-05-16 ENCOUNTER — HOSPITAL ENCOUNTER (OUTPATIENT)
Age: 69
Discharge: HOME | End: 2019-05-16
Payer: MEDICARE

## 2019-05-16 ENCOUNTER — APPOINTMENT (OUTPATIENT)
Dept: PHYSICAL THERAPY | Age: 69
End: 2019-05-16
Payer: MEDICARE

## 2019-05-16 LAB
APTT: 32.7 SEC (ref 26–36)
BASOPHILS ABSOLUTE: 0.1 K/UL (ref 0–0.2)
BASOPHILS RELATIVE PERCENT: 1.1 %
EOSINOPHILS ABSOLUTE: 0.1 K/UL (ref 0–0.6)
EOSINOPHILS RELATIVE PERCENT: 2 %
HCT VFR BLD CALC: 28.4 % (ref 36–48)
HEMOGLOBIN: 9.6 G/DL (ref 12–16)
INR BLD: 1.21 (ref 0.86–1.14)
LYMPHOCYTES ABSOLUTE: 1.2 K/UL (ref 1–5.1)
LYMPHOCYTES RELATIVE PERCENT: 24.9 %
MCH RBC QN AUTO: 29.4 PG (ref 26–34)
MCHC RBC AUTO-ENTMCNC: 33.7 G/DL (ref 31–36)
MCV RBC AUTO: 87.5 FL (ref 80–100)
MONOCYTES ABSOLUTE: 0.5 K/UL (ref 0–1.3)
MONOCYTES RELATIVE PERCENT: 10.7 %
NEUTROPHILS ABSOLUTE: 3 K/UL (ref 1.7–7.7)
NEUTROPHILS RELATIVE PERCENT: 61.3 %
PDW BLD-RTO: 14.6 % (ref 12.4–15.4)
PLATELET # BLD: 226 K/UL (ref 135–450)
PMV BLD AUTO: 7 FL (ref 5–10.5)
PROTHROMBIN TIME: 13.8 SEC (ref 9.8–13)
RBC # BLD: 3.25 M/UL (ref 4–5.2)
WBC # BLD: 4.9 K/UL (ref 4–11)

## 2019-05-16 PROCEDURE — 85025 COMPLETE CBC W/AUTO DIFF WBC: CPT

## 2019-05-16 PROCEDURE — 36415 COLL VENOUS BLD VENIPUNCTURE: CPT

## 2019-05-16 PROCEDURE — 85610 PROTHROMBIN TIME: CPT

## 2019-05-16 PROCEDURE — 85730 THROMBOPLASTIN TIME PARTIAL: CPT

## 2019-05-21 ENCOUNTER — APPOINTMENT (OUTPATIENT)
Dept: PHYSICAL THERAPY | Age: 69
End: 2019-05-21
Payer: MEDICARE

## 2019-05-23 ENCOUNTER — APPOINTMENT (OUTPATIENT)
Dept: PHYSICAL THERAPY | Age: 69
End: 2019-05-23
Payer: MEDICARE

## 2019-08-31 ENCOUNTER — HOSPITAL ENCOUNTER (OUTPATIENT)
Age: 69
Discharge: HOME | End: 2019-08-31
Payer: MEDICARE

## 2019-08-31 DIAGNOSIS — K76.9 LIVER PROBLEM: ICD-10-CM

## 2019-08-31 LAB
APTT: 30.5 SEC (ref 26–36)
ATYPICAL LYMPHOCYTE RELATIVE PERCENT: 2 % (ref 0–6)
BANDED NEUTROPHILS RELATIVE PERCENT: 1 % (ref 0–7)
BASOPHILS ABSOLUTE: 0 K/UL (ref 0–0.2)
BASOPHILS RELATIVE PERCENT: 1 %
EOSINOPHILS ABSOLUTE: 0 K/UL (ref 0–0.6)
EOSINOPHILS RELATIVE PERCENT: 1 %
HCT VFR BLD CALC: 31 % (ref 36–48)
HEMOGLOBIN: 9.6 G/DL (ref 12–16)
INR BLD: 1.24 (ref 0.86–1.14)
LYMPHOCYTES ABSOLUTE: 1 K/UL (ref 1–5.1)
LYMPHOCYTES RELATIVE PERCENT: 24 %
MCH RBC QN AUTO: 21.8 PG (ref 26–34)
MCHC RBC AUTO-ENTMCNC: 31 G/DL (ref 31–36)
MCV RBC AUTO: 70.3 FL (ref 80–100)
METAMYELOCYTES RELATIVE PERCENT: 1 %
MONOCYTES ABSOLUTE: 0.4 K/UL (ref 0–1.3)
MONOCYTES RELATIVE PERCENT: 10 %
NEUTROPHILS ABSOLUTE: 2.4 K/UL (ref 1.7–7.7)
NEUTROPHILS RELATIVE PERCENT: 60 %
PDW BLD-RTO: 21.2 % (ref 12.4–15.4)
PLATELET # BLD: 257 K/UL (ref 135–450)
PLATELET SLIDE REVIEW: ADEQUATE
PMV BLD AUTO: 8.2 FL (ref 5–10.5)
PROTHROMBIN TIME: 14.1 SEC (ref 9.8–13)
RBC # BLD: 4.41 M/UL (ref 4–5.2)
RBC # BLD: NORMAL 10*6/UL
SLIDE REVIEW: ABNORMAL
WBC # BLD: 3.8 K/UL (ref 4–11)

## 2019-08-31 PROCEDURE — 36415 COLL VENOUS BLD VENIPUNCTURE: CPT

## 2019-08-31 PROCEDURE — 85730 THROMBOPLASTIN TIME PARTIAL: CPT

## 2019-08-31 PROCEDURE — 85610 PROTHROMBIN TIME: CPT

## 2019-08-31 PROCEDURE — 85025 COMPLETE CBC W/AUTO DIFF WBC: CPT

## 2019-10-07 ENCOUNTER — HOSPITAL ENCOUNTER (OUTPATIENT)
Age: 69
Discharge: HOME | End: 2019-10-07
Payer: MEDICARE

## 2019-10-07 LAB
INR BLD: 1.29 (ref 0.86–1.14)
PROTHROMBIN TIME: 14.7 SEC (ref 9.8–13)

## 2019-10-07 PROCEDURE — 85610 PROTHROMBIN TIME: CPT

## 2019-10-07 PROCEDURE — 36415 COLL VENOUS BLD VENIPUNCTURE: CPT

## 2020-06-23 PROBLEM — M54.6 CHRONIC THORACIC BACK PAIN: Status: ACTIVE | Noted: 2020-06-23

## 2020-06-23 PROBLEM — G89.29 CHRONIC THORACIC BACK PAIN: Status: ACTIVE | Noted: 2020-06-23

## 2020-06-23 PROBLEM — M51.24 DISC DISPLACEMENT, THORACIC: Status: ACTIVE | Noted: 2020-06-23

## 2020-08-20 PROBLEM — G89.29 CHRONIC THORACIC SPINE PAIN: Status: ACTIVE | Noted: 2020-08-20

## 2020-08-20 PROBLEM — M54.6 CHRONIC THORACIC SPINE PAIN: Status: ACTIVE | Noted: 2020-08-20

## 2020-09-18 NOTE — ADDENDUM NOTE
Addended by: Bibi Mchugh on: 11/9/2018 07:24 PM     Modules accepted: Josselyn DISPLAY PLAN FREE TEXT

## 2021-11-23 ENCOUNTER — APPOINTMENT (OUTPATIENT)
Dept: CT IMAGING | Age: 71
DRG: 377 | End: 2021-11-23
Payer: MEDICARE

## 2021-11-23 ENCOUNTER — HOSPITAL ENCOUNTER (INPATIENT)
Age: 71
LOS: 11 days | Discharge: NURSING, REHAB OR LONG-TERM CARE | DRG: 377 | End: 2021-12-04
Attending: EMERGENCY MEDICINE | Admitting: INTERNAL MEDICINE
Payer: MEDICARE

## 2021-11-23 ENCOUNTER — APPOINTMENT (OUTPATIENT)
Dept: GENERAL RADIOLOGY | Age: 71
DRG: 377 | End: 2021-11-23
Payer: MEDICARE

## 2021-11-23 DIAGNOSIS — D64.9 ANEMIA, UNSPECIFIED TYPE: ICD-10-CM

## 2021-11-23 DIAGNOSIS — K76.82 HEPATIC ENCEPHALOPATHY: Primary | ICD-10-CM

## 2021-11-23 LAB
A/G RATIO: 0.7 (ref 1.1–2.2)
A/G RATIO: 0.8 (ref 1.1–2.2)
ABO/RH: NORMAL
ALBUMIN SERPL-MCNC: 2.8 G/DL (ref 3.4–5)
ALBUMIN SERPL-MCNC: 3 G/DL (ref 3.4–5)
ALP BLD-CCNC: 123 U/L (ref 40–129)
ALP BLD-CCNC: 157 U/L (ref 40–129)
ALT SERPL-CCNC: 36 U/L (ref 10–40)
ALT SERPL-CCNC: 37 U/L (ref 10–40)
AMMONIA: 176 UMOL/L (ref 11–51)
ANION GAP SERPL CALCULATED.3IONS-SCNC: 12 MMOL/L (ref 3–16)
ANION GAP SERPL CALCULATED.3IONS-SCNC: 20 MMOL/L (ref 3–16)
ANTIBODY SCREEN: NORMAL
AST SERPL-CCNC: 81 U/L (ref 15–37)
AST SERPL-CCNC: 82 U/L (ref 15–37)
BASE EXCESS VENOUS: -6.9 MMOL/L (ref -3–3)
BASOPHILS ABSOLUTE: 0.1 K/UL (ref 0–0.2)
BASOPHILS RELATIVE PERCENT: 0.5 %
BILIRUB SERPL-MCNC: 4 MG/DL (ref 0–1)
BILIRUB SERPL-MCNC: 4.6 MG/DL (ref 0–1)
BILIRUBIN URINE: NEGATIVE
BLOOD, URINE: NEGATIVE
BUN BLDV-MCNC: 28 MG/DL (ref 7–20)
BUN BLDV-MCNC: 29 MG/DL (ref 7–20)
CALCIUM SERPL-MCNC: 7.9 MG/DL (ref 8.3–10.6)
CALCIUM SERPL-MCNC: 8.7 MG/DL (ref 8.3–10.6)
CARBOXYHEMOGLOBIN: 8.6 % (ref 0–1.5)
CHLORIDE BLD-SCNC: 100 MMOL/L (ref 99–110)
CHLORIDE BLD-SCNC: 108 MMOL/L (ref 99–110)
CLARITY: CLEAR
CO2: 16 MMOL/L (ref 21–32)
CO2: 19 MMOL/L (ref 21–32)
COLOR: YELLOW
CREAT SERPL-MCNC: <0.5 MG/DL (ref 0.6–1.2)
CREAT SERPL-MCNC: <0.5 MG/DL (ref 0.6–1.2)
EKG ATRIAL RATE: 117 BPM
EKG DIAGNOSIS: NORMAL
EKG P AXIS: 73 DEGREES
EKG P-R INTERVAL: 158 MS
EKG Q-T INTERVAL: 340 MS
EKG QRS DURATION: 92 MS
EKG QTC CALCULATION (BAZETT): 474 MS
EKG R AXIS: 55 DEGREES
EKG T AXIS: 56 DEGREES
EKG VENTRICULAR RATE: 117 BPM
EOSINOPHILS ABSOLUTE: 0 K/UL (ref 0–0.6)
EOSINOPHILS RELATIVE PERCENT: 0.1 %
GFR AFRICAN AMERICAN: >60
GFR AFRICAN AMERICAN: >60
GFR NON-AFRICAN AMERICAN: >60
GFR NON-AFRICAN AMERICAN: >60
GLUCOSE BLD-MCNC: 152 MG/DL (ref 70–99)
GLUCOSE BLD-MCNC: 171 MG/DL (ref 70–99)
GLUCOSE BLD-MCNC: 176 MG/DL (ref 70–99)
GLUCOSE URINE: NEGATIVE MG/DL
HCO3 VENOUS: 15.1 MMOL/L (ref 23–29)
HCT VFR BLD CALC: 24 % (ref 36–48)
HCT VFR BLD CALC: 25.2 % (ref 36–48)
HEMOGLOBIN: 7.6 G/DL (ref 12–16)
HEMOGLOBIN: 7.6 G/DL (ref 12–16)
KETONES, URINE: 15 MG/DL
LACTIC ACID: 4.3 MMOL/L (ref 0.4–2)
LACTIC ACID: 5.9 MMOL/L (ref 0.4–2)
LACTIC ACID: 9.8 MMOL/L (ref 0.4–2)
LEUKOCYTE ESTERASE, URINE: NEGATIVE
LYMPHOCYTES ABSOLUTE: 0.8 K/UL (ref 1–5.1)
LYMPHOCYTES RELATIVE PERCENT: 4.7 %
MCH RBC QN AUTO: 24.6 PG (ref 26–34)
MCHC RBC AUTO-ENTMCNC: 30.1 G/DL (ref 31–36)
MCV RBC AUTO: 81.7 FL (ref 80–100)
METHEMOGLOBIN VENOUS: 0.6 %
MICROSCOPIC EXAMINATION: ABNORMAL
MONOCYTES ABSOLUTE: 0.9 K/UL (ref 0–1.3)
MONOCYTES RELATIVE PERCENT: 5.6 %
NEUTROPHILS ABSOLUTE: 14.5 K/UL (ref 1.7–7.7)
NEUTROPHILS RELATIVE PERCENT: 89.1 %
NITRITE, URINE: NEGATIVE
O2 SAT, VEN: 99 %
O2 THERAPY: ABNORMAL
OCCULT BLOOD SCREENING: NORMAL
OCCULT BLOOD, OTHER: ABNORMAL
PCO2, VEN: 20.3 MMHG (ref 40–50)
PDW BLD-RTO: 23.3 % (ref 12.4–15.4)
PERFORMED ON: ABNORMAL
PH UA: 7.5 (ref 5–8)
PH VENOUS: 7.49 (ref 7.35–7.45)
PH, GASTRIC: ABNORMAL
PLATELET # BLD: 194 K/UL (ref 135–450)
PMV BLD AUTO: 8.4 FL (ref 5–10.5)
PO2, VEN: 140.4 MMHG (ref 25–40)
POTASSIUM REFLEX MAGNESIUM: 4.2 MMOL/L (ref 3.5–5.1)
POTASSIUM SERPL-SCNC: 3.8 MMOL/L (ref 3.5–5.1)
PROCALCITONIN: 0.18 NG/ML (ref 0–0.15)
PROTEIN UA: NEGATIVE MG/DL
RBC # BLD: 3.09 M/UL (ref 4–5.2)
SODIUM BLD-SCNC: 136 MMOL/L (ref 136–145)
SODIUM BLD-SCNC: 139 MMOL/L (ref 136–145)
SPECIFIC GRAVITY UA: 1.01 (ref 1–1.03)
SPECIMEN STATUS: NORMAL
TCO2 CALC VENOUS: 16 MMOL/L
TOTAL CK: 213 U/L (ref 26–192)
TOTAL PROTEIN: 6.1 G/DL (ref 6.4–8.2)
TOTAL PROTEIN: 7.1 G/DL (ref 6.4–8.2)
URINE TYPE: ABNORMAL
UROBILINOGEN, URINE: 1 E.U./DL
WBC # BLD: 16.3 K/UL (ref 4–11)

## 2021-11-23 PROCEDURE — 6360000004 HC RX CONTRAST MEDICATION: Performed by: EMERGENCY MEDICINE

## 2021-11-23 PROCEDURE — 82550 ASSAY OF CK (CPK): CPT

## 2021-11-23 PROCEDURE — C9113 INJ PANTOPRAZOLE SODIUM, VIA: HCPCS | Performed by: EMERGENCY MEDICINE

## 2021-11-23 PROCEDURE — 36415 COLL VENOUS BLD VENIPUNCTURE: CPT

## 2021-11-23 PROCEDURE — 82270 OCCULT BLOOD FECES: CPT

## 2021-11-23 PROCEDURE — P9016 RBC LEUKOCYTES REDUCED: HCPCS

## 2021-11-23 PROCEDURE — 2580000003 HC RX 258: Performed by: INTERNAL MEDICINE

## 2021-11-23 PROCEDURE — 74018 RADEX ABDOMEN 1 VIEW: CPT

## 2021-11-23 PROCEDURE — 85018 HEMOGLOBIN: CPT

## 2021-11-23 PROCEDURE — 93005 ELECTROCARDIOGRAM TRACING: CPT | Performed by: EMERGENCY MEDICINE

## 2021-11-23 PROCEDURE — 71045 X-RAY EXAM CHEST 1 VIEW: CPT

## 2021-11-23 PROCEDURE — 6360000002 HC RX W HCPCS: Performed by: EMERGENCY MEDICINE

## 2021-11-23 PROCEDURE — 86850 RBC ANTIBODY SCREEN: CPT

## 2021-11-23 PROCEDURE — 83605 ASSAY OF LACTIC ACID: CPT

## 2021-11-23 PROCEDURE — 81003 URINALYSIS AUTO W/O SCOPE: CPT

## 2021-11-23 PROCEDURE — 84145 PROCALCITONIN (PCT): CPT

## 2021-11-23 PROCEDURE — 70450 CT HEAD/BRAIN W/O DYE: CPT

## 2021-11-23 PROCEDURE — 82140 ASSAY OF AMMONIA: CPT

## 2021-11-23 PROCEDURE — 84134 ASSAY OF PREALBUMIN: CPT

## 2021-11-23 PROCEDURE — 85014 HEMATOCRIT: CPT

## 2021-11-23 PROCEDURE — 82271 OCCULT BLOOD OTHER SOURCES: CPT

## 2021-11-23 PROCEDURE — 6360000002 HC RX W HCPCS: Performed by: INTERNAL MEDICINE

## 2021-11-23 PROCEDURE — 74177 CT ABD & PELVIS W/CONTRAST: CPT

## 2021-11-23 PROCEDURE — 87040 BLOOD CULTURE FOR BACTERIA: CPT

## 2021-11-23 PROCEDURE — 86900 BLOOD TYPING SEROLOGIC ABO: CPT

## 2021-11-23 PROCEDURE — 85025 COMPLETE CBC W/AUTO DIFF WBC: CPT

## 2021-11-23 PROCEDURE — 96360 HYDRATION IV INFUSION INIT: CPT

## 2021-11-23 PROCEDURE — 2580000003 HC RX 258: Performed by: EMERGENCY MEDICINE

## 2021-11-23 PROCEDURE — 93010 ELECTROCARDIOGRAM REPORT: CPT | Performed by: INTERNAL MEDICINE

## 2021-11-23 PROCEDURE — 86901 BLOOD TYPING SEROLOGIC RH(D): CPT

## 2021-11-23 PROCEDURE — 82803 BLOOD GASES ANY COMBINATION: CPT

## 2021-11-23 PROCEDURE — 86923 COMPATIBILITY TEST ELECTRIC: CPT

## 2021-11-23 PROCEDURE — C9113 INJ PANTOPRAZOLE SODIUM, VIA: HCPCS | Performed by: INTERNAL MEDICINE

## 2021-11-23 PROCEDURE — P9045 ALBUMIN (HUMAN), 5%, 250 ML: HCPCS | Performed by: INTERNAL MEDICINE

## 2021-11-23 PROCEDURE — 80053 COMPREHEN METABOLIC PANEL: CPT

## 2021-11-23 PROCEDURE — 6370000000 HC RX 637 (ALT 250 FOR IP): Performed by: EMERGENCY MEDICINE

## 2021-11-23 PROCEDURE — 99284 EMERGENCY DEPT VISIT MOD MDM: CPT

## 2021-11-23 PROCEDURE — 2000000000 HC ICU R&B

## 2021-11-23 RX ORDER — ALBUMIN, HUMAN INJ 5% 5 %
25 SOLUTION INTRAVENOUS ONCE
Status: COMPLETED | OUTPATIENT
Start: 2021-11-23 | End: 2021-11-23

## 2021-11-23 RX ORDER — OCTREOTIDE ACETATE 100 UG/ML
50 INJECTION, SOLUTION INTRAVENOUS; SUBCUTANEOUS ONCE
Status: COMPLETED | OUTPATIENT
Start: 2021-11-23 | End: 2021-11-23

## 2021-11-23 RX ORDER — SODIUM CHLORIDE 9 MG/ML
INJECTION, SOLUTION INTRAVENOUS PRN
Status: DISCONTINUED | OUTPATIENT
Start: 2021-11-23 | End: 2021-12-04 | Stop reason: HOSPADM

## 2021-11-23 RX ORDER — 0.9 % SODIUM CHLORIDE 0.9 %
1000 INTRAVENOUS SOLUTION INTRAVENOUS ONCE
Status: COMPLETED | OUTPATIENT
Start: 2021-11-23 | End: 2021-11-23

## 2021-11-23 RX ORDER — ONDANSETRON 4 MG/1
4 TABLET, ORALLY DISINTEGRATING ORAL EVERY 8 HOURS PRN
Status: DISCONTINUED | OUTPATIENT
Start: 2021-11-23 | End: 2021-12-04 | Stop reason: HOSPADM

## 2021-11-23 RX ORDER — SODIUM CHLORIDE 9 MG/ML
25 INJECTION, SOLUTION INTRAVENOUS PRN
Status: DISCONTINUED | OUTPATIENT
Start: 2021-11-23 | End: 2021-11-26

## 2021-11-23 RX ORDER — ACETAMINOPHEN 325 MG/1
650 TABLET ORAL EVERY 6 HOURS PRN
Status: DISCONTINUED | OUTPATIENT
Start: 2021-11-23 | End: 2021-12-04 | Stop reason: HOSPADM

## 2021-11-23 RX ORDER — LACTULOSE 10 G/15ML
20 SOLUTION ORAL ONCE
Status: COMPLETED | OUTPATIENT
Start: 2021-11-23 | End: 2021-11-23

## 2021-11-23 RX ORDER — ONDANSETRON 2 MG/ML
4 INJECTION INTRAMUSCULAR; INTRAVENOUS EVERY 6 HOURS PRN
Status: DISCONTINUED | OUTPATIENT
Start: 2021-11-23 | End: 2021-12-04 | Stop reason: HOSPADM

## 2021-11-23 RX ORDER — SODIUM CHLORIDE 0.9 % (FLUSH) 0.9 %
5-40 SYRINGE (ML) INJECTION PRN
Status: DISCONTINUED | OUTPATIENT
Start: 2021-11-23 | End: 2021-12-04 | Stop reason: HOSPADM

## 2021-11-23 RX ORDER — ACETAMINOPHEN 650 MG/1
650 SUPPOSITORY RECTAL EVERY 6 HOURS PRN
Status: DISCONTINUED | OUTPATIENT
Start: 2021-11-23 | End: 2021-12-04 | Stop reason: HOSPADM

## 2021-11-23 RX ORDER — PANTOPRAZOLE SODIUM 40 MG/1
40 TABLET, DELAYED RELEASE ORAL
Status: DISCONTINUED | OUTPATIENT
Start: 2021-11-26 | End: 2021-11-26

## 2021-11-23 RX ORDER — SODIUM CHLORIDE 0.9 % (FLUSH) 0.9 %
5-40 SYRINGE (ML) INJECTION EVERY 12 HOURS SCHEDULED
Status: DISCONTINUED | OUTPATIENT
Start: 2021-11-23 | End: 2021-12-04 | Stop reason: HOSPADM

## 2021-11-23 RX ADMIN — SODIUM CHLORIDE 25 ML: 9 INJECTION, SOLUTION INTRAVENOUS at 18:41

## 2021-11-23 RX ADMIN — OCTREOTIDE ACETATE 50 MCG/HR: 500 INJECTION, SOLUTION INTRAVENOUS; SUBCUTANEOUS at 15:33

## 2021-11-23 RX ADMIN — SODIUM CHLORIDE, PRESERVATIVE FREE 10 ML: 5 INJECTION INTRAVENOUS at 21:33

## 2021-11-23 RX ADMIN — OCTREOTIDE ACETATE 50 MCG: 100 INJECTION, SOLUTION INTRAVENOUS; SUBCUTANEOUS at 15:30

## 2021-11-23 RX ADMIN — ALBUMIN (HUMAN) 12.5 G: 12.5 INJECTION, SOLUTION INTRAVENOUS at 18:44

## 2021-11-23 RX ADMIN — IOPAMIDOL 75 ML: 755 INJECTION, SOLUTION INTRAVENOUS at 11:47

## 2021-11-23 RX ADMIN — CEFTRIAXONE SODIUM 1000 MG: 1 INJECTION, POWDER, FOR SOLUTION INTRAMUSCULAR; INTRAVENOUS at 18:46

## 2021-11-23 RX ADMIN — SODIUM CHLORIDE 8 MG/HR: 9 INJECTION, SOLUTION INTRAVENOUS at 14:34

## 2021-11-23 RX ADMIN — OCTREOTIDE ACETATE 50 MCG/HR: 500 INJECTION, SOLUTION INTRAVENOUS; SUBCUTANEOUS at 22:39

## 2021-11-23 RX ADMIN — SODIUM CHLORIDE 1000 ML: 9 INJECTION, SOLUTION INTRAVENOUS at 13:26

## 2021-11-23 RX ADMIN — SODIUM CHLORIDE 80 MG: 9 INJECTION, SOLUTION INTRAVENOUS at 14:31

## 2021-11-23 RX ADMIN — SODIUM CHLORIDE 1000 ML: 9 INJECTION, SOLUTION INTRAVENOUS at 10:03

## 2021-11-23 RX ADMIN — LACTULOSE 20 G: 20 SOLUTION ORAL at 13:15

## 2021-11-23 RX ADMIN — SODIUM CHLORIDE 8 MG/HR: 9 INJECTION, SOLUTION INTRAVENOUS at 22:10

## 2021-11-23 NOTE — ED PROVIDER NOTES
CHIEF COMPLAINT  Altered Mental Status (patient found on the floor this AM, unresponsive to . Last known well was sometime last evening.  per squad. Patient becoming more responsive with yes/no per squad. )      [facility]  Luis Paul is a 80 y.o. female with a history of esophagitis, cirrhosis, and hypertension who presents to the ED complaining of altered mental status. Patient was reportedly found on the floor this morning by . .  By his report, she often sleeps on the couch and likely rolled off the couch at some point. She was minimally responsive this morning and would only respond to voice with \"yes\" answers. No additional history reported. No other complaints, modifying factors or associated symptoms. I have reviewed the following from the nursing documentation. Past Medical History:   Diagnosis Date    Esophagitis     Hypertension      Past Surgical History:   Procedure Laterality Date    ENDOSCOPIC ULTRASOUND (UPPER)       No family history on file. Social History     Socioeconomic History    Marital status:      Spouse name: Not on file    Number of children: Not on file    Years of education: Not on file    Highest education level: Not on file   Occupational History    Not on file   Tobacco Use    Smoking status: Never Smoker    Smokeless tobacco: Never Used   Substance and Sexual Activity    Alcohol use: Yes    Drug use: No    Sexual activity: Not on file   Other Topics Concern    Not on file   Social History Narrative    Not on file     Social Determinants of Health     Financial Resource Strain:     Difficulty of Paying Living Expenses: Not on file   Food Insecurity:     Worried About Running Out of Food in the Last Year: Not on file    Echo of Food in the Last Year: Not on file   Transportation Needs:     Lack of Transportation (Medical): Not on file    Lack of Transportation (Non-Medical):  Not on file   Physical Activity:     Days of Exercise per Week: Not on file    Minutes of Exercise per Session: Not on file   Stress:     Feeling of Stress : Not on file   Social Connections:     Frequency of Communication with Friends and Family: Not on file    Frequency of Social Gatherings with Friends and Family: Not on file    Attends Orthodoxy Services: Not on file    Active Member of [address] [place] [address] or Organizations: Not on file    Attends Club or Organization Meetings: Not on file    Marital Status: Not on file   Intimate Partner Violence:     Fear of Current or Ex-Partner: Not on file    Emotionally Abused: Not on file    Physically Abused: Not on file    Sexually Abused: Not on file   Housing Stability:     Unable to Pay for Housing in the Last Year: Not on file    Number of [place] in the Last Year: Not on file    Unstable Housing in the Last Year: Not on file     Current Facility-Administered Medications   Medication Dose Route Frequency Provider Last Rate Last Admin    lactulose ([facility]) 10 GM/15ML solution 20 g  20 g Oral Once Leandro Barreto, DO         Current Outpatient Medications   Medication Sig Dispense Refill    naloxone 4 MG/0.1ML LIQD nasal spray 1 spray by Nasal route as needed for Opioid Reversal 1 each 0    cyclobenzaprine (FLEXERIL) 10 MG tablet TAKE 1 TABLET BY MOUTH EVERY 12 HOURS AS NEEDED FOR PAIN. 40 tablet 0    rifaximin (XIFAXAN) 550 MG tablet Take 1 tablet by mouth 2 times daily 60 tablet 2    pantoprazole (PROTONIX) 40 MG tablet Take 1 tablet by mouth every morning (before breakfast) 30 tablet 3     Allergies   Allergen Reactions    Azithromycin Nausea And Vomiting    Sulfa Antibiotics Rash       REVIEW OF SYSTEMS  10 systems reviewed, pertinent positives per HPI otherwise noted to be negative. PHYSICAL EXAM  BP (!) 148/59   Pulse 109   Temp 95.5 °F (35.3 °C)   Resp 15   Ht 5' 5\" (1.651 m)   Wt 132 lb (59.9 kg)   SpO2 99%   BMI 21.97 kg/m²   GENERAL APPEARANCE: Somnolent. Minimally arousable. . Cooperative. No acute distress. Jaundiced. HEAD: Normocephalic. Atraumatic. EYES: Scleral icterus noted. . EOM's grossly intact. .  ENT: Mucous membranes are moist.   NECK: Supple, trachea midline. HEART: RRR. Normal S1, S2. No murmurs, rubs or gallops. LUNGS: Respirations unlabored. CTAB. Good air exchange. No wheezes, rales, or rhonchi. Speaking comfortably in full sentences. ABDOMEN: Soft. Non-distended. Non-tender. No guarding or rebound. Normal Bowel sounds. EXTREMITIES: No peripheral edema. MAEE. No acute deformities. SKIN: Warm and dry. No acute rashes. NEUROLOGICAL: Somnolent. Arousable to voice/stimulation. Will respond to all questioning with \"yes. \"  PSYCHIATRIC: Normal mood and affect. LABS  I have reviewed all labs for this visit.    Results for orders placed or performed during the hospital encounter of 11/23/21   CBC Auto Differential   Result Value Ref Range    WBC 16.3 (H) 4.0 - 11.0 K/uL    RBC 3.09 (L) 4.00 - 5.20 M/uL    Hemoglobin 7.6 (L) 12.0 - 16.0 g/dL    Hematocrit 25.2 (L) 36.0 - 48.0 %    MCV 81.7 80.0 - 100.0 fL    MCH 24.6 (L) 26.0 - 34.0 pg    MCHC 30.1 (L) 31.0 - 36.0 g/dL    RDW 23.3 (H) 12.4 - 15.4 %    Platelets 190 723 - 523 K/uL    MPV 8.4 5.0 - 10.5 fL    Neutrophils % 89.1 %    Lymphocytes % 4.7 %    Monocytes % 5.6 %    Eosinophils % 0.1 %    Basophils % 0.5 %    Neutrophils Absolute 14.5 (H) 1.7 - 7.7 K/uL    Lymphocytes Absolute 0.8 (L) 1.0 - 5.1 K/uL    Monocytes Absolute 0.9 0.0 - 1.3 K/uL    Eosinophils Absolute 0.0 0.0 - 0.6 K/uL    Basophils Absolute 0.1 0.0 - 0.2 K/uL   Lactic Acid, Plasma   Result Value Ref Range    Lactic Acid 9.8 (HH) 0.4 - 2.0 mmol/L   Comprehensive Metabolic Panel   Result Value Ref Range    Sodium 136 136 - 145 mmol/L    Potassium 3.8 3.5 - 5.1 mmol/L    Chloride 100 99 - 110 mmol/L    CO2 16 (L) 21 - 32 mmol/L    Anion Gap 20 (H) 3 - 16    Glucose 176 (H) 70 - 99 mg/dL    BUN 29 (H) 7 - 20 mg/dL CREATININE <0.5 (L) 0.6 - 1.2 mg/dL    GFR Non-African American >60 >60    GFR African American >60 >60    Calcium 8.7 8.3 - 10.6 mg/dL    Total Protein 7.1 6.4 - 8.2 g/dL    Albumin 3.0 (L) 3.4 - 5.0 g/dL    Albumin/Globulin Ratio 0.7 (L) 1.1 - 2.2    Total Bilirubin 4.6 (H) 0.0 - 1.0 mg/dL    Alkaline Phosphatase 157 (H) 40 - 129 U/L    ALT 37 10 - 40 U/L    AST 81 (H) 15 - 37 U/L   Procalcitonin   Result Value Ref Range    Procalcitonin 0.18 (H) 0.00 - 0.15 ng/mL   Blood gas, venous   Result Value Ref Range    pH, Kelvin 7.490 (H) 7.350 - 7.450    pCO2, Kelvin 20.3 (L) 40.0 - 50.0 mmHg    pO2, Kelvin 140.4 (H) 25.0 - 40.0 mmHg    HCO3, Venous 15.1 (L) 23.0 - 29.0 mmol/L    Base Excess, Kelvin -6.9 (L) -3.0 - 3.0 mmol/L    O2 Sat, Kelvin 99 Not Established %    Carboxyhemoglobin 8.6 (H) 0.0 - 1.5 %    MetHgb, Kelvin 0.6 <1.5 %    TC02 (Calc), Kelvin 16 Not Established mmol/L    O2 Therapy Unknown    Ammonia   Result Value Ref Range    Ammonia 176 (HH) 11 - 51 umol/L   Urinalysis   Result Value Ref Range    Color, UA Yellow Straw/Yellow    Clarity, UA Clear Clear    Glucose, Ur Negative Negative mg/dL    Bilirubin Urine Negative Negative    Ketones, Urine 15 (A) Negative mg/dL    Specific Gravity, UA 1.015 1.005 - 1.030    Blood, Urine Negative Negative    pH, UA 7.5 5.0 - 8.0    Protein, UA Negative Negative mg/dL    Urobilinogen, Urine 1.0 <2.0 E.U./dL    Nitrite, Urine Negative Negative    Leukocyte Esterase, Urine Negative Negative    Microscopic Examination Not Indicated     Urine Type NotGiven    Sample possible blood bank testing   Result Value Ref Range    Specimen Status MARCO    Lactic Acid, Plasma   Result Value Ref Range    Lactic Acid 5.9 (HH) 0.4 - 2.0 mmol/L   POCT Glucose   Result Value Ref Range    POC Glucose 171 (H) 70 - 99 mg/dl    Performed on ACCU-CHEK    EKG 12 Lead   Result Value Ref Range    Ventricular Rate 117 BPM    Atrial Rate 117 BPM    P-R Interval 158 ms    QRS Duration 92 ms    Q-T Interval 340 ms    QTc Calculation (Bazett) 474 ms    P Axis 73 degrees    R Axis 55 degrees    T Axis 56 degrees    Diagnosis       Sinus tachycardiaNonspecific ST abnormalityAbnormal ECGWhen compared with ECG of 07-DEC-2018 07:45,Non-specific change in ST segment in Inferior leadsST now depressed in Anterior leads       EKG  The Ekg interpreted by myself  Sinus tachycardia with a rate of 117. Normal axis. Normal intervals and durations. Nonspecific T wave changes noted. Cardiac Monitoring: Tachycardic. No ectopy. RADIOLOGY  X-RAYS:  I have reviewed radiologic plain film image(s). ALL OTHER NON-PLAIN FILM IMAGES SUCH AS CT, ULTRASOUND AND MRI HAVE BEEN READ BY THE RADIOLOGIST. CT ABDOMEN PELVIS W IV CONTRAST Additional Contrast? None   Preliminary Result   1. Cirrhosis with evidence of portal hypertension including multiple upper   varices including distal esophageal and gastric varices as well as a   spontaneous splenorenal shunt. Mild third-spacing with edematous changes   throughout the abdomen and subcutaneous soft tissues. 2. Cholelithiasis with no acute features. 3. Distention with mural thickening in the duodenal sweep and proximal small   bowel. There is no evidence of obstruction. Findings may be related to a   duodenitis and enteritis. 4. Mural thickening of the distal esophagus with evidence of reflux, likely a   reflux esophagitis. CT HEAD WO CONTRAST   Final Result   Atrophy and mild small vessel ischemic disease. XR CHEST PORTABLE   Final Result   No radiographic evidence of acute pulmonary disease. Rechecks: Physical assessment performed. 920: Pulse 113.    1005: Pulse 109.    1225: Pulse 121. Patient unchanged neurologically.       Critical care of 40 minutes was completed on patient in addition to and excluding any procedures noted secondary to concerns for hepatic encephalopathy  and potential decompensation      ED COURSE/MDM  Patient seen and evaluated. Old records reviewed. Labs and imaging reviewed and results discussed with patient. Patient was given lactulose in the ED. Patient's labs reveal severe hyperammonemia. .  Lactic acidosis/leukocytosis is noted. Does not felt to be related to sepsis but is more likely secondary to acute on chronic liver failure with hyperammonemia. GI consulted. Agree with lactulose and NG tube placement at this time. Plan of care discussed with patient and family. Patient and family in agreement with plan. Patient was given scripts for the following medications. I counseled patient how to take these medications. New Prescriptions    No medications on file       CLINICAL IMPRESSION  1. Hepatic encephalopathy ([facility] [address]    2. Anemia, unspecified type        Blood pressure (!) 148/59, pulse 109, temperature 95.5 °F (35.3 °C), resp. rate 15, height 5' 5\" (1.651 m), weight 132 lb (59.9 kg), SpO2 99 %. DISPOSITION  Maryann Jesus was admitted in guarded condition. Condition.         Arelis [place],   11/23/21 1200 [facility]  11/23/21 1338

## 2021-11-23 NOTE — CARE COORDINATION
CASE MANAGEMENT INITIAL ASSESSMENT      Reviewed chart and completed assessment with patient and   Explained Case Management role/services. yes    Primary contact information:, [address] :           Can this person be reached and be able to respond quickly, such as within a few minutes or hours? Yes      Admit date/status:11/23/21  Diagnosis:anemia, GI bleed, AMS   Is this a Readmission?:  No      Insurance:Medicare   Precert required for SNF: No       3 night stay required: waived due to [address] arrangements, Adls, care needs, prior to admission:from home with , Independent in ADLs    Transportation:car     Durable Medical Equipment at home:  Walker__Cane__RTS__ BSC__Shower Chair__  02__ HHN__ CPAP__  BiPap__  Hospital Bed__ W/C___ Other__________    Services in the home and/or outpatient, prior to [address] (if applicable)   · Name:  · Address:  · Dialysis Schedule:  · Phone:  · Fax:    PT/OT recs:    Hospital Exemption Notification (HEN):    Barriers to discharge:medical complications    Plan/comments:Referred to patient for d/c planning. Spoke to patient and . Patient is a 80year old female admitted for GI bleed, anemia. Patient usually lives at home with . Per , patient is independent in ADLs. Per , patient drives. Case management to follow for d/c needs.  Electronically signed by HE Rosas LISW-S on 11/23/2021 at 4:49 PM      ECOC on chart for MD signature

## 2021-11-23 NOTE — Clinical Note
Patient Class: Inpatient [101]   REQUIRED: Diagnosis: Hepatic encephalopathy ([facility] [address] [572. 2. ICD-9-CM]   Estimated Length of Stay: Estimated stay of more than 2 midnights   Telemetry/Cardiac Monitoring Required?: Yes

## 2021-11-23 NOTE — ED NOTES
Bed: 02  Expected date:   Expected time:   Means of arrival:   Comments:  [facility]     Cecelia Kauffman RN  11/23/21 4888

## 2021-11-23 NOTE — ED NOTES
Called GI consult @[phone]  Re: hyperammonemia per [email or web address] returned Suzette Lott [phone]  11/23/21 4254

## 2021-11-23 NOTE — H&P
Hospital Medicine History & Physical      PCP: Nichelle Gresham    Date of Admission: 11/23/2021    Date of Service: Pt seen/examined on 11/23/21 and Admitted to Inpatient with expected LOS greater than two midnights due to medical therapy. Chief Complaint:  confusion      History Of Present Illness:  (per emr as pt is encephalopathic)    80 y.o. female with cirrhosis, etoh use who presented to [facility] with confusion. Pt was found by  today this morning around 6-7am, standing up between the couch and coffee table. He recalls hearing a 'crash' this morning prompting him to come down to check on her. She was INcoherent this am.  He believes she still drinks but is unsure(he works 12 hour shifts). She was relatively normal last night but noted to be more fatigued/tired and sleepy than usual. She has been off her meds and has hx of poor compliance with follow up. No n/v/diarrhea  -Pt was brought in for further evaluation    ER course: NG placed, noted significant quantiy of dark red outpt, GI consulted in ER (informed about need for egd given findings), ppi ggt and octreotide ggt started      Past Medical History:          Diagnosis Date    Esophagitis     Hypertension        Past Surgical History:          Procedure Laterality Date    ENDOSCOPIC ULTRASOUND (UPPER)         Medications Prior to Admission:      Prior to Admission medications    Medication Sig Start Date End Date Taking?  Authorizing Provider   naloxone 4 MG/0.1ML LIQD nasal spray 1 spray by Nasal route as needed for Opioid Reversal 10/21/20   Cherelle Vicente MD   cyclobenzaprine (FLEXERIL) 10 MG tablet TAKE 1 TABLET BY MOUTH EVERY 12 HOURS AS NEEDED FOR PAIN. 3/11/19   Tod Perez MD   rifaximin (XIFAXAN) 550 MG tablet Take 1 tablet by mouth 2 times daily 12/10/18   Yomaira Allred MD   pantoprazole (PROTONIX) 40 MG tablet Take 1 tablet by mouth every morning (before breakfast) 12/11/18   Yomaria Allred MD       Allergies: Azithromycin and Sulfa antibiotics    Social History:      The patient currently lives at home    TOBACCO:   reports that she has never smoked. She has never used smokeless tobacco.  ETOH:   reports current alcohol use. E-Cigarettes/Vaping Use     Questions Responses    E-Cigarette/Vaping Use     Start Date     Passive Exposure     Quit Date     Counseling Given     Comments             Family History:       Reviewed in detail and negative for DM, CAD, Cancer, CVA. Positive as follows:    No family history on file. REVIEW OF SYSTEMS COMPLETED:   Pertinent positives as noted in the HPI. All other systems reviewed and negative. PHYSICAL EXAM PERFORMED:    BP (!) 142/50   Pulse 130   Temp 99.1 °F (37.3 °C) (Bladder)   Resp 18   Ht 5' 5\" (1.651 m)   Wt 116 lb 13.5 oz (53 kg)   SpO2 99%   BMI 19.44 kg/m²     General appearance:  No apparent distress, appears stated age and cooperative. Poorly responsive  HEENT:  Normal cephalic, atraumatic without obvious deformity. Pupils equal, round, and reactive to light. Extra ocular muscles intact. Conjunctivae/corneas clear. NG in place with signif dark red outpt  Neck: Supple, with full range of motion. No jugular venous distention. Trachea midline. Respiratory:  Normal respiratory effort. Clear to auscultation, bilaterally without Rales/Wheezes/Rhonchi. Cardiovascular:  Regular rate and rhythm with normal S1/S2 without murmurs, rubs or gallops. Abdomen: Soft, non-tender, non-distended with normal bowel sounds. Musculoskeletal:  No clubbing, cyanosis or edema bilaterally. Full range of motion without deformity. Skin: Skin color, texture, turgor normal.  No rashes or lesions. Neurologic:  Neurovascularly intact without any focal sensory/motor deficits.  Cranial nerves: II-XII intact, grossly non-focal.  Psychiatric:  Alert and oriented, thought content not appropriate, not normal insight  Capillary Refill: Brisk,3 seconds, normal  Peripheral Pulses: +2 palpable, equal bilaterally       Labs:     Recent Labs     11/23/21 0913 11/23/21 1847   WBC 16.3*  --    HGB 7.6* 7.6*   HCT 25.2* 24.0*     --      Recent Labs     11/23/21 0913 11/23/21 1847    139   K 3.8 4.2    108   CO2 16* 19*   BUN 29* 28*   CREATININE <0.5* <0.5*   CALCIUM 8.7 7.9*     Recent Labs     11/23/21 0913 11/23/21 1847   AST 81* 82*   ALT 37 36   BILITOT 4.6* 4.0*   ALKPHOS 157* 123     No results for input(s): INR in the last 72 hours. Recent Labs     11/23/21 0913   CKTOTAL 213*       Urinalysis:      Lab Results   Component Value Date    NITRU Negative 11/23/2021    BLOODU Negative 11/23/2021    SPECGRAV 1.015 11/23/2021    GLUCOSEU Negative 11/23/2021       Radiology:     CXR: I have reviewed the CXR with the following interpretation: na  EKG:  I have reviewed the EKG with the following interpretation: sinus tach, 117, nl axis, no gross ischemic changes noted, nonspecific st changes noted    XR ABDOMEN (KUB) (SINGLE AP VIEW)   Final Result   Enteric tube tip and side port project over the stomach. CT ABDOMEN PELVIS W IV CONTRAST Additional Contrast? None   Preliminary Result   1. Cirrhosis with evidence of portal hypertension including multiple upper   varices including distal esophageal and gastric varices as well as a   spontaneous splenorenal shunt. Mild third-spacing with edematous changes   throughout the abdomen and subcutaneous soft tissues. 2. Cholelithiasis with no acute features. 3. Distention with mural thickening in the duodenal sweep and proximal small   bowel. There is no evidence of obstruction. Findings may be related to a   duodenitis and enteritis. 4. Mural thickening of the distal esophagus with evidence of reflux, likely a   reflux esophagitis. CT HEAD WO CONTRAST   Final Result   Atrophy and mild small vessel ischemic disease. XR CHEST PORTABLE   Final Result   No radiographic evidence of acute pulmonary disease. ASSESSMENT:    Active Hospital Problems    Diagnosis Date Noted    Hepatic encephalopathy ([facility] [address] [K72.90] 12/06/2018         PLAN:    Hepatic encephalopathy- with ammonia of 176 on arrival, CThead was unremarkable for bleed  -GI consulted, likely will need EGD sooner rather than later  -NG placed in ER, lactulose will need to be ordered  -ppi ggt  -octreotide ggt  -ICU care    Anemia- with concern for coffee ground emesis and upper GIB(?variceal)  -trended h/h  -on ppi ggt  -2u prbc ordered in ER  -tele  -Iv rocephin started 11/23    Lactic acidosis- concern related to above anemia, CTa/p without obvious pathology  -trended labs  -ivf bolus given    HTN- per emr,not on meds  -monitor VS    transaminitis/Cirrhosis- sees Dr. Cindy Morrissey, apparently was taking rifaximin per emr  -start lactulose when able      DVT Prophylaxis: scd  Diet: Diet NPO Exceptions are: Ice Chips  Code Status: Full Code(confirmed with )    PT/OT Eval Status: not ordered    Dispo - icu care, guarded prognosis       Myra Funez MD    Thank you Vicki Nguyen for the opportunity to be involved in this patient's care. If you have any questions or concerns please feel free to contact me at [phone].

## 2021-11-23 NOTE — ED NOTES
Pt came in per squad. pt was found down on the floor this AM by . Last known well was last even before bedtime. Pt is awake however unable to answer any questions correctly. Pt disorientated X4. Pt skin and eyes pale and jaundice. Pt does have a HX of hepatic encephalopathy. Pt also with hx of Lactic acidosis and acute metabolic encephalopathy MD aware of pt increase lactic and ammonia level. Pt does have a wound on her inner buttocks red warm heart applied to prevent further breakdown. Pt with low temp warm blankets applied and rotated in and out to increase pt temp. Pt with poor skin turgor. Pt non mobil unable to walk. Awaiting family to bedside. Will cont to monitor.       Walt Brown RN  11/23/21 3762

## 2021-11-23 NOTE — CONSULTS
Gastroenterology Consult Note    Patient:   Elizabeth Caicedo   YOB: 1950   Facility:   [facility]   Referring/PCP: Solomon De Souza  Date:     11/23/2021  Consultant:   Nneka Kinney MD, MD    Subjective: This 80 y.o. female was admitted 11/23/2021 with a diagnosis of \"Hepatic encephalopathy ([address] [K72.90]\" and is seen in consultation regarding \"encephalopathy\". Information was obtained from interview of  the patient, examination of the patient's ER doctor and nurses and Hospitalist, and review of records. I did  update the past medical, surgical, social and / or family history. Encephalopathy severe in CNS for 1 day assoc w coffee ground per NGT and anemia    Current status  Present  Diet Order: No diet orders on file and she is not tolerating diet. Recently, she has experienced no abdominal  Pain and she has not required Intravenous narcotic analgesics. The patient has also experienced no constipation, diarrhea, fever, hematochezia, melena and vomiting      Prior to Admission medications    Medication Sig Start Date End Date Taking? Authorizing Provider   naloxone 4 MG/0.1ML LIQD nasal spray 1 spray by Nasal route as needed for Opioid Reversal 10/21/20   Karl Beauchamp MD   cyclobenzaprine (FLEXERIL) 10 MG tablet TAKE 1 TABLET BY MOUTH EVERY 12 HOURS AS NEEDED FOR PAIN. 3/11/19   Desean Bartlett MD   rifaximin (XIFAXAN) 550 MG tablet Take 1 tablet by mouth 2 times daily 12/10/18   Eulalio Herrmann MD   pantoprazole (PROTONIX) 40 MG tablet Take 1 tablet by mouth every morning (before breakfast) 12/11/18   Eulalio Herrmann MD      Scheduled Medications:    [START ON 11/26/2021] pantoprazole  40 mg Oral QAM AC     Infusions:    pantoprazole 8 mg/hr (11/23/21 1434)    sodium chloride      octreotide (SANDOSTATIN) infusion 50 mcg/hr (11/23/21 1533)     PRN Medications: sodium chloride  Allergies:    Allergies   Allergen Reactions    Azithromycin Nausea And Vomiting    Sulfa Antibiotics Rash       Past Medical History:   Diagnosis Date    Esophagitis     Hypertension      Past Surgical History:   Procedure Laterality Date    ENDOSCOPIC ULTRASOUND (UPPER)         Social:   Social History     Tobacco Use    Smoking status: Never Smoker    Smokeless tobacco: Never Used   Substance Use Topics    Alcohol use: Yes     Family: No family history on file. ROS: Pertinent items are noted in HPI.     Objective:   Vital Signs:  Temp (24hrs), Av.1 °F (36.2 °C), Min:95 °F (35 °C), Max:98.5 °F (48.8 °C)     Systolic (36BOO), MRB:274 , Min:93 , ZYA:908      Diastolic (80BPH), CYM:22, Min:57, Max:78     Pulse  Av.6  Min: 109  Max: 141  BP (!) 177/78   Pulse 132   Temp 98.5 °F (36.9 °C)   Resp 17   Ht 5' 5\" (1.651 m)   Wt 132 lb (59.9 kg)   SpO2 95%   BMI 21.97 kg/m²      Physical Exam:   BP (!) 141/53   Pulse 109   Temp 99 °F (37.2 °C) (Bladder)   Resp 15   Ht 5' 5\" (1.651 m)   Wt 116 lb 13.5 oz (53 kg)   SpO2 94%   BMI 19.44 kg/m²   General appearance: Obtunded  Lungs: clear to auscultation bilaterally  Chest wall: no tenderness  Heart: regular rate and rhythm, S1, S2 normal, no murmur, click, rub or gallop  Abdomen: soft, non-tender; bowel sounds normal; no masses,  no organomegaly  Extremities: extremities normal, atraumatic, no cyanosis or edema  Skin: Skin color, texture, turgor normal. No rashes or lesions  Neurologic: Obtunded    Lab and Imaging Review   Recent Labs     21  0913   WBC 16.3*   HGB 7.6*   MCV 81.7         K 3.8      CO2 16*   BUN 29*   CREATININE <0.5*   GLUCOSE 176*   CALCIUM 8.7   PROT 7.1   LABALBU 3.0*   AST 81*   ALT 37   ALKPHOS 157*   BILITOT 4.6*   AMMONIA 176*       Assessment:     Patient Active Problem List    Diagnosis Date Noted    Chronic thoracic spine pain 2020    Disc displacement, thoracic 2020    Chronic thoracic back pain 2020    Moderate malnutrition ([facility] [address] 2018    Hepatic encephalopathy ([facility] [address] 12/06/2018    Hyponatremia 12/06/2018    Acute metabolic encephalopathy 70/47/4488    Hypokalemia 12/06/2018    QT prolongation 12/06/2018    Lactic acidosis 12/06/2018    Elevated LFTs 12/06/2018    Kyphosis of thoracic region 11/26/2018    DDD (degenerative disc disease), thoracic 11/26/2018    Scapular dyskinesis 11/26/2018     79 yo w HTN and ETOH cirrhosis and continued ETOH abuse and non compliance w medical F/U, presented w hepatic encephalopathy and coffee ground per NGT placed to administer Lactulose, along w tachycardia and H/H of 7.6/25. Plan:   1. Supportive care  2. Protonix and Octreotide gtts  3. Will need an EGD after hemodynamic stabilization  4. F/U H/H and transfuse as appropriate  5.  Will follow closely    Meenakshi Espinoza MD       (O) [phone]

## 2021-11-23 NOTE — ED NOTES
Patient identified as a positive fall risk on the ED triage fall screening. Patient placed in fall precautions which includes:  yellow fall risk bracelet on wrist and yellow socks on feet. Patient instructed on importance of not getting out of bed or ambulating without assistance for safety. Pt verbalized understanding.      Gage Magdaleno RN  11/23/21 [id number]

## 2021-11-24 ENCOUNTER — APPOINTMENT (OUTPATIENT)
Dept: GENERAL RADIOLOGY | Age: 71
DRG: 377 | End: 2021-11-24
Payer: MEDICARE

## 2021-11-24 LAB
A/G RATIO: 0.9 (ref 1.1–2.2)
ALBUMIN SERPL-MCNC: 2.3 G/DL (ref 3.4–5)
ALP BLD-CCNC: 83 U/L (ref 40–129)
ALT SERPL-CCNC: 32 U/L (ref 10–40)
AMMONIA: 250 UMOL/L (ref 11–51)
ANION GAP SERPL CALCULATED.3IONS-SCNC: 9 MMOL/L (ref 3–16)
AST SERPL-CCNC: 73 U/L (ref 15–37)
BASE EXCESS ARTERIAL: -0.1 MMOL/L (ref -3–3)
BASE EXCESS ARTERIAL: -0.6 MMOL/L (ref -3–3)
BASOPHILS ABSOLUTE: 0 K/UL (ref 0–0.2)
BASOPHILS RELATIVE PERCENT: 0.1 %
BILIRUB SERPL-MCNC: 4.3 MG/DL (ref 0–1)
BUN BLDV-MCNC: 26 MG/DL (ref 7–20)
CALCIUM SERPL-MCNC: 6.9 MG/DL (ref 8.3–10.6)
CARBOXYHEMOGLOBIN ARTERIAL: 0.6 % (ref 0–1.5)
CARBOXYHEMOGLOBIN ARTERIAL: 0.6 % (ref 0–1.5)
CHLORIDE BLD-SCNC: 115 MMOL/L (ref 99–110)
CO2: 19 MMOL/L (ref 21–32)
CREAT SERPL-MCNC: <0.5 MG/DL (ref 0.6–1.2)
EOSINOPHILS ABSOLUTE: 0 K/UL (ref 0–0.6)
EOSINOPHILS RELATIVE PERCENT: 0 %
GFR AFRICAN AMERICAN: >60
GFR NON-AFRICAN AMERICAN: >60
GLUCOSE BLD-MCNC: 135 MG/DL (ref 70–99)
GLUCOSE BLD-MCNC: 140 MG/DL (ref 70–99)
HCO3 ARTERIAL: 21.4 MMOL/L (ref 21–29)
HCO3 ARTERIAL: 22.2 MMOL/L (ref 21–29)
HCT VFR BLD CALC: 26.4 % (ref 36–48)
HCT VFR BLD CALC: 27.1 % (ref 36–48)
HCT VFR BLD CALC: 27.1 % (ref 36–48)
HCT VFR BLD CALC: 28 % (ref 36–48)
HEMOGLOBIN, ART, EXTENDED: 7.8 G/DL (ref 12–16)
HEMOGLOBIN, ART, EXTENDED: 8.7 G/DL (ref 12–16)
HEMOGLOBIN: 8.7 G/DL (ref 12–16)
HEMOGLOBIN: 8.8 G/DL (ref 12–16)
HEMOGLOBIN: 9 G/DL (ref 12–16)
HEMOGLOBIN: 9.1 G/DL (ref 12–16)
IRON SATURATION: 39 % (ref 15–50)
IRON: 105 UG/DL (ref 37–145)
LACTIC ACID: 2.3 MMOL/L (ref 0.4–2)
LACTIC ACID: 3 MMOL/L (ref 0.4–2)
LACTIC ACID: 3 MMOL/L (ref 0.4–2)
LACTIC ACID: 4.2 MMOL/L (ref 0.4–2)
LYMPHOCYTES ABSOLUTE: 1.1 K/UL (ref 1–5.1)
LYMPHOCYTES RELATIVE PERCENT: 6.9 %
MAGNESIUM: 2 MG/DL (ref 1.8–2.4)
MCH RBC QN AUTO: 26.8 PG (ref 26–34)
MCHC RBC AUTO-ENTMCNC: 33.1 G/DL (ref 31–36)
MCV RBC AUTO: 81 FL (ref 80–100)
METHEMOGLOBIN ARTERIAL: 0.9 %
METHEMOGLOBIN ARTERIAL: 1.1 %
MONOCYTES ABSOLUTE: 0.7 K/UL (ref 0–1.3)
MONOCYTES RELATIVE PERCENT: 4.2 %
NEUTROPHILS ABSOLUTE: 14.5 K/UL (ref 1.7–7.7)
NEUTROPHILS RELATIVE PERCENT: 88.8 %
O2 SAT, ARTERIAL: 93.2 %
O2 SAT, ARTERIAL: 98.6 %
O2 THERAPY: ABNORMAL
O2 THERAPY: ABNORMAL
PCO2 ARTERIAL: 25.8 MMHG (ref 35–45)
PCO2 ARTERIAL: 27.1 MMHG (ref 35–45)
PDW BLD-RTO: 18.9 % (ref 12.4–15.4)
PERFORMED ON: ABNORMAL
PH ARTERIAL: 7.53 (ref 7.35–7.45)
PH ARTERIAL: 7.54 (ref 7.35–7.45)
PLATELET # BLD: 116 K/UL (ref 135–450)
PMV BLD AUTO: 8.1 FL (ref 5–10.5)
PO2 ARTERIAL: 173.6 MMHG (ref 75–108)
PO2 ARTERIAL: 65.7 MMHG (ref 75–108)
POTASSIUM REFLEX MAGNESIUM: 3.5 MMOL/L (ref 3.5–5.1)
PREALBUMIN: 7 MG/DL (ref 20–40)
RBC # BLD: 3.25 M/UL (ref 4–5.2)
SARS-COV-2, NAAT: NOT DETECTED
SODIUM BLD-SCNC: 143 MMOL/L (ref 136–145)
TCO2 ARTERIAL: 22.2 MMOL/L
TCO2 ARTERIAL: 23.1 MMOL/L
TOTAL IRON BINDING CAPACITY: 269 UG/DL (ref 260–445)
TOTAL PROTEIN: 5 G/DL (ref 6.4–8.2)
WBC # BLD: 16.3 K/UL (ref 4–11)

## 2021-11-24 PROCEDURE — 6360000002 HC RX W HCPCS: Performed by: INTERNAL MEDICINE

## 2021-11-24 PROCEDURE — 31645 BRNCHSC W/THER ASPIR 1ST: CPT | Performed by: INTERNAL MEDICINE

## 2021-11-24 PROCEDURE — 6370000000 HC RX 637 (ALT 250 FOR IP): Performed by: NURSE PRACTITIONER

## 2021-11-24 PROCEDURE — 94002 VENT MGMT INPAT INIT DAY: CPT

## 2021-11-24 PROCEDURE — C9113 INJ PANTOPRAZOLE SODIUM, VIA: HCPCS | Performed by: INTERNAL MEDICINE

## 2021-11-24 PROCEDURE — 2000000000 HC ICU R&B

## 2021-11-24 PROCEDURE — 2580000003 HC RX 258: Performed by: INTERNAL MEDICINE

## 2021-11-24 PROCEDURE — 94761 N-INVAS EAR/PLS OXIMETRY MLT: CPT

## 2021-11-24 PROCEDURE — 5A1945Z RESPIRATORY VENTILATION, 24-96 CONSECUTIVE HOURS: ICD-10-PCS | Performed by: INTERNAL MEDICINE

## 2021-11-24 PROCEDURE — 74018 RADEX ABDOMEN 1 VIEW: CPT

## 2021-11-24 PROCEDURE — 83550 IRON BINDING TEST: CPT

## 2021-11-24 PROCEDURE — 82140 ASSAY OF AMMONIA: CPT

## 2021-11-24 PROCEDURE — 2720000010 HC SURG SUPPLY STERILE: Performed by: INTERNAL MEDICINE

## 2021-11-24 PROCEDURE — 2709999900 HC NON-CHARGEABLE SUPPLY: Performed by: INTERNAL MEDICINE

## 2021-11-24 PROCEDURE — 85025 COMPLETE CBC W/AUTO DIFF WBC: CPT

## 2021-11-24 PROCEDURE — 85018 HEMOGLOBIN: CPT

## 2021-11-24 PROCEDURE — 6370000000 HC RX 637 (ALT 250 FOR IP): Performed by: INTERNAL MEDICINE

## 2021-11-24 PROCEDURE — 0BC18ZZ EXTIRPATION OF MATTER FROM TRACHEA, VIA NATURAL OR ARTIFICIAL OPENING ENDOSCOPIC: ICD-10-PCS | Performed by: INTERNAL MEDICINE

## 2021-11-24 PROCEDURE — 83735 ASSAY OF MAGNESIUM: CPT

## 2021-11-24 PROCEDURE — 3609013000 HC EGD TRANSORAL CONTROL BLEEDING ANY METHOD: Performed by: INTERNAL MEDICINE

## 2021-11-24 PROCEDURE — 83605 ASSAY OF LACTIC ACID: CPT

## 2021-11-24 PROCEDURE — 87635 SARS-COV-2 COVID-19 AMP PRB: CPT

## 2021-11-24 PROCEDURE — 82803 BLOOD GASES ANY COMBINATION: CPT

## 2021-11-24 PROCEDURE — 80053 COMPREHEN METABOLIC PANEL: CPT

## 2021-11-24 PROCEDURE — 71045 X-RAY EXAM CHEST 1 VIEW: CPT

## 2021-11-24 PROCEDURE — 0BH17EZ INSERTION OF ENDOTRACHEAL AIRWAY INTO TRACHEA, VIA NATURAL OR ARTIFICIAL OPENING: ICD-10-PCS | Performed by: INTERNAL MEDICINE

## 2021-11-24 PROCEDURE — 2700000000 HC OXYGEN THERAPY PER DAY

## 2021-11-24 PROCEDURE — 31500 INSERT EMERGENCY AIRWAY: CPT | Performed by: INTERNAL MEDICINE

## 2021-11-24 PROCEDURE — 83540 ASSAY OF IRON: CPT

## 2021-11-24 PROCEDURE — 6360000002 HC RX W HCPCS

## 2021-11-24 PROCEDURE — 85014 HEMATOCRIT: CPT

## 2021-11-24 PROCEDURE — 36415 COLL VENOUS BLD VENIPUNCTURE: CPT

## 2021-11-24 PROCEDURE — 0W3P8ZZ CONTROL BLEEDING IN GASTROINTESTINAL TRACT, VIA NATURAL OR ARTIFICIAL OPENING ENDOSCOPIC: ICD-10-PCS | Performed by: INTERNAL MEDICINE

## 2021-11-24 RX ORDER — PROPOFOL 10 MG/ML
5-50 INJECTION, EMULSION INTRAVENOUS
Status: DISCONTINUED | OUTPATIENT
Start: 2021-11-24 | End: 2021-11-29

## 2021-11-24 RX ORDER — PROPOFOL 10 MG/ML
INJECTION, EMULSION INTRAVENOUS
Status: COMPLETED
Start: 2021-11-24 | End: 2021-11-24

## 2021-11-24 RX ORDER — 0.9 % SODIUM CHLORIDE 0.9 %
500 INTRAVENOUS SOLUTION INTRAVENOUS ONCE
Status: COMPLETED | OUTPATIENT
Start: 2021-11-24 | End: 2021-11-24

## 2021-11-24 RX ORDER — LACTULOSE 10 G/15ML
30 SOLUTION ORAL ONCE
Status: COMPLETED | OUTPATIENT
Start: 2021-11-24 | End: 2021-11-24

## 2021-11-24 RX ORDER — LACTULOSE 10 G/15ML
20 SOLUTION ORAL EVERY 6 HOURS SCHEDULED
Status: DISCONTINUED | OUTPATIENT
Start: 2021-11-24 | End: 2021-11-30

## 2021-11-24 RX ADMIN — OCTREOTIDE ACETATE 50 MCG/HR: 500 INJECTION, SOLUTION INTRAVENOUS; SUBCUTANEOUS at 07:52

## 2021-11-24 RX ADMIN — PROPOFOL 5 MCG/KG/MIN: 10 INJECTION, EMULSION INTRAVENOUS at 19:57

## 2021-11-24 RX ADMIN — CEFTRIAXONE SODIUM 1000 MG: 1 INJECTION, POWDER, FOR SOLUTION INTRAMUSCULAR; INTRAVENOUS at 18:08

## 2021-11-24 RX ADMIN — SODIUM CHLORIDE 500 ML: 9 INJECTION, SOLUTION INTRAVENOUS at 08:55

## 2021-11-24 RX ADMIN — LACTULOSE 30 G: 20 SOLUTION ORAL at 08:03

## 2021-11-24 RX ADMIN — OCTREOTIDE ACETATE 50 MCG/HR: 500 INJECTION, SOLUTION INTRAVENOUS; SUBCUTANEOUS at 20:03

## 2021-11-24 RX ADMIN — Medication 20 G: at 18:08

## 2021-11-24 RX ADMIN — SODIUM CHLORIDE 8 MG/HR: 9 INJECTION, SOLUTION INTRAVENOUS at 07:51

## 2021-11-24 RX ADMIN — MUPIROCIN: 20 OINTMENT TOPICAL at 20:57

## 2021-11-24 RX ADMIN — LACTULOSE: 10 SOLUTION ORAL at 22:02

## 2021-11-24 RX ADMIN — SODIUM CHLORIDE, PRESERVATIVE FREE 10 ML: 5 INJECTION INTRAVENOUS at 07:54

## 2021-11-24 RX ADMIN — RIFAXIMIN 550 MG: 550 TABLET ORAL at 22:02

## 2021-11-24 RX ADMIN — Medication 20 G: at 15:04

## 2021-11-24 RX ADMIN — SODIUM CHLORIDE 8 MG/HR: 9 INJECTION, SOLUTION INTRAVENOUS at 20:05

## 2021-11-24 RX ADMIN — SODIUM CHLORIDE, PRESERVATIVE FREE 10 ML: 5 INJECTION INTRAVENOUS at 20:57

## 2021-11-24 ASSESSMENT — PULMONARY FUNCTION TESTS
PIF_VALUE: 13
PIF_VALUE: 16

## 2021-11-24 NOTE — H&P
Esophagogastroduodenoscopy Note    Patient:   Kelsea Dumont    YOB: 1950    Facility:   [facility] [Inpatient]   Referring/PCP: Marilu Daly    Procedure:   Esophagogastroduodenoscopy --diagnostic  Date:     11/24/2021   Endoscopist:  Alfred Gomes MD     Preoperative Diagnosis: Hematemesis and cirrhosis/coma (encephalopathy)  Postoperative Diagnosis: Proximal gastric Dieulafoy non bleeding lesion, Endoclipped    Anesthesia:  none  Estimated blood loss: <8FQ  Complications: None    Description of Procedure:  Informed consent was obtained from the patient's POA after explanation of the procedure including indications, description of the procedure,  benefits and possible risks and complications of the procedure, and alternatives. Questions were answered. The patient's history was reviewed and a directed physical examination was performed prior to the procedure. Patient was monitored throughout the procedure with pulse oximetry and periodic assessment of vital signs. Patient was sedated as noted above. The Nursing staff and I performed a time out. With the patient in the left lateral decubitus position, the Olympus videoendoscope was placed in the patient's mouth and under direct visualization passed into the esophagus. The scope was ultimately passed to the third portion of the duodenum. Visualization was performed during both introduction and withdrawal of the endoscope and retroflexed view of the proximal stomach was obtained. Findings[de-identified]   Esophagus: normal. The findings do not support a diagnosis of Sharma's Esophagus or varices. Stomach: Proximal gastric Dieulafoy non bleeding lesion, Endoclipped, with coffee ground coating the gastric mucosa  Duodenum: normal    Recommendations: -Continue acid suppression and Octreotide for now, and would start Lactulose per NGT (after placement confirmation) or enemas, continue Abx's, and F/U H/H.     Muriel Spear Anna Ivan MD       (O) [phone]          Treell Garcia MD, MD

## 2021-11-24 NOTE — PLAN OF CARE
Problem: Falls - Risk of:  Goal: Will remain free from falls  Description: Will remain free from falls  Outcome: Ongoing  Goal: Absence of physical injury  Description: Absence of physical injury  Outcome: Ongoing     Problem: Skin Integrity:  Goal: Will show no infection signs and symptoms  Description: Will show no infection signs and symptoms  Outcome: Ongoing  Goal: Absence of new skin breakdown  Description: Absence of new skin breakdown  Outcome: Ongoing     Problem: Bleeding:  Goal: Will show no signs and symptoms of excessive bleeding  Description: Will show no signs and symptoms of excessive bleeding  Outcome: Ongoing

## 2021-11-24 NOTE — H&P
Pre-sedation Assessment    History and Physical / Pre-Sedation Assessment  Patient:  Elizabeth Caicedo   :   1950     Intended Procedure: EGD      HPI: 81 yo w HTN and ETOH cirrhosis and continued ETOH abuse and non compliance w medical F/U, presented w hepatic encephalopathy and coffee ground per NGT placed to administer Lactulose, along w tachycardia and H/H of 7.6/25. Plan:   1. Supportive care  2.  Protonix and Octreotide gtts  Will need an EGD     Current Facility-Administered Medications   Medication Dose Route Frequency Provider Last Rate Last Admin    pantoprazole (PROTONIX) 80 mg in sodium chloride 0.9 % 100 mL infusion  8 mg/hr IntraVENous Continuous Joseph Dowd MD 10 mL/hr at 21 8 mg/hr at 21    [START ON 2021] pantoprazole (PROTONIX) tablet 40 mg  40 mg Oral QAM AC Joseph Dowd MD        0.9 % sodium chloride infusion   IntraVENous PRN Joseph Dowd MD        octreotide (SANDOSTATIN) 500 mcg in sodium chloride 0.9 % 100 mL infusion  50 mcg/hr IntraVENous Continuous Joseph Dowd MD 10 mL/hr at 219 50 mcg/hr at 21 223    sodium chloride flush 0.9 % injection 5-40 mL  5-40 mL IntraVENous 2 times per day Joseph Dowd MD   10 mL at 213    sodium chloride flush 0.9 % injection 5-40 mL  5-40 mL IntraVENous PRN Joseph Dowd MD        0.9 % sodium chloride infusion  25 mL IntraVENous PRN Joseph Dowd  mL/hr at 21 1841 25 mL at 21 1841    ondansetron (ZOFRAN-ODT) disintegrating tablet 4 mg  4 mg Oral Q8H PRN Joseph Dowd MD        Or    ondansetron [facility]) injection 4 mg  4 mg IntraVENous Q6H PRN Joseph Dowd MD       Fraire acetaminophen (TYLENOL) tablet 650 mg  650 mg Oral Q6H PRN Joseph Dowd MD        Or   Fraire acetaminophen (TYLENOL) suppository 650 mg  650 mg Rectal Q6H PRN Joseph Dowd MD        cefTRIAXone (ROCEPHIN) 1000 mg IVPB in 50 mL D5W minibag  1,000 mg IntraVENous Q24H Joseph Dowd MD   Stopped at 11/23/21 1941     Past Medical History:   Diagnosis Date    Esophagitis     Hypertension      Past Surgical History:   Procedure Laterality Date    ENDOSCOPIC ULTRASOUND (UPPER)         Nurses notes reviewed and agreed. Medications reviewed  Allergies: Allergies   Allergen Reactions    Azithromycin Nausea And Vomiting    Sulfa Antibiotics Rash           Physical Exam:  Vital Signs: BP (!) 141/53   Pulse 109   Temp 99 °F (37.2 °C) (Bladder)   Resp 15   Ht 5' 5\" (1.651 m)   Wt 116 lb 13.5 oz (53 kg)   SpO2 94%   BMI 19.44 kg/m²  Body mass index is 19.44 kg/m². Airway:Normal  Cardiac:Normal  Pulmonary:Normal  Abdomen:Normal  Specific to procedure: none      Pre-Procedure Assessment/Plan:  ASA 4 - Patient with severe systemic disease that is a constant threat to life  Mallampati I  Level of Sedation Plan:No sedation    Post Procedure plan: Return to same level of care    I assessed the patient and find that the patient is in satisfactory condition to proceed with the planned procedure and sedation plan. I have explained the risk, benefits, and alternatives to the procedure. The patient's POA understands and agrees to proceed.   Yes    Nneka Kinney MD       (O) [phone]          Nneka Kinney MD  6:25 AM 11/24/2021

## 2021-11-24 NOTE — OP NOTE
Esophagogastroduodenoscopy Note    Patient:   Garfield Owen    YOB: 1950    Facility:   [facility] [Inpatient]   Referring/PCP: Brown Nixon    Procedure:   Esophagogastroduodenoscopy --diagnostic  Date:     11/24/2021   Endoscopist:  Brian Alvarez MD     Preoperative Diagnosis: Hematemesis and cirrhosis/coma (encephalopathy)  Postoperative Diagnosis: Proximal gastric Dieulafoy non bleeding lesion, Endoclipped    Anesthesia:  none  Estimated blood loss: <1AK  Complications: None    Description of Procedure:  Informed consent was obtained from the patient's POA after explanation of the procedure including indications, description of the procedure,  benefits and possible risks and complications of the procedure, and alternatives. Questions were answered. The patient's history was reviewed and a directed physical examination was performed prior to the procedure. Patient was monitored throughout the procedure with pulse oximetry and periodic assessment of vital signs. Patient was sedated as noted above. The Nursing staff and I performed a time out. With the patient in the left lateral decubitus position, the Olympus videoendoscope was placed in the patient's mouth and under direct visualization passed into the esophagus. The scope was ultimately passed to the third portion of the duodenum. Visualization was performed during both introduction and withdrawal of the endoscope and retroflexed view of the proximal stomach was obtained. Findings[de-identified]   Esophagus: normal. The findings do not support a diagnosis of Sharma's Esophagus or varices. Stomach: Proximal gastric Dieulafoy non bleeding lesion, Endoclipped, with coffee ground coating the gastric mucosa  Duodenum: normal    Recommendations: -Continue acid suppression and Octreotide for now, and would start Lactulose per NGT (after placement confirmation) or enemas, continue Abx's, and F/U H/H.     Ann-Marie Chen Dylan Camacho MD       (O) [phone]          Usman Ellis MD, MD

## 2021-11-24 NOTE — PROGRESS NOTES
Hospitalist Progress Note      PCP: Gillian Jimenez    Date of Admission: 11/23/2021    Chief Complaint: confusion    Hospital Course:   80 y.o. female with cirrhosis, etoh use who presented to [facility] with confusion/hepatic encephalopathy and GIB. Placed in ICU. Subjective:  Remains obtunded/sleepy, had egd this AM       Medications:  Reviewed    Infusion Medications    pantoprazole 8 mg/hr (11/23/21 2210)    sodium chloride      octreotide (SANDOSTATIN) infusion 50 mcg/hr (11/23/21 2239)    sodium chloride 25 mL (11/23/21 1841)     Scheduled Medications    mupirocin   Nasal BID    [START ON 11/26/2021] pantoprazole  40 mg Oral QAM AC    sodium chloride flush  5-40 mL IntraVENous 2 times per day    cefTRIAXone (ROCEPHIN) IV  1,000 mg IntraVENous Q24H     PRN Meds: sodium chloride, sodium chloride flush, sodium chloride, ondansetron **OR** ondansetron, acetaminophen **OR** acetaminophen      Intake/Output Summary (Last 24 hours) at 11/24/2021 0750  Last data filed at 11/24/2021 0749  Gross per 24 hour   Intake 2267.92 ml   Output 4075 ml   Net -1807.08 ml       Physical Exam Performed:    BP (!) 141/53   Pulse 109   Temp 99.2 °F (37.3 °C) (Bladder)   Resp 15   Ht 5' 5\" (1.651 m)   Wt 123 lb 7.3 oz (56 kg)   SpO2 94%   BMI 20.54 kg/m²     General appearance:  No apparent distress, appears stated age and cooperative. Poorly responsive  HEENT:  Normal cephalic, atraumatic without obvious deformity. Pupils equal, round, and reactive to light. Extra ocular muscles intact. Conjunctivae/corneas clear. NG in place with signif dark red outpt  Neck: Supple, with full range of motion. No jugular venous distention. Trachea midline. Respiratory:  Normal respiratory effort. Clear to auscultation, bilaterally without Rales/Wheezes/Rhonchi. Cardiovascular:  Regular rate and rhythm with normal S1/S2 without murmurs, rubs or gallops.   Abdomen: Soft, non-tender, non-distended with normal bowel sounds. Musculoskeletal:  No clubbing, cyanosis or edema bilaterally. Full range of motion without deformity. Skin: Skin color, texture, turgor normal.  No rashes or lesions. Neurologic:  Neurovascularly intact without any focal sensory/motor deficits. Cranial nerves: II-XII intact, grossly non-focal.  Psychiatric:  Alert and oriented x0, thought content not appropriate, not normal insight  Capillary Refill: Brisk,3 seconds, normal  Peripheral Pulses: +2 palpable, equal bilaterally       Labs:   Recent Labs     11/23/21 0913 11/23/21 1847 11/24/21  0418   WBC 16.3*  --   --    HGB 7.6* 7.6* 9.0*   HCT 25.2* 24.0* 27.1*     --   --      Recent Labs     11/23/21 0913 11/23/21 1847    139   K 3.8 4.2    108   CO2 16* 19*   BUN 29* 28*   CREATININE <0.5* <0.5*   CALCIUM 8.7 7.9*     Recent Labs     11/23/21 0913 11/23/21 1847   AST 81* 82*   ALT 37 36   BILITOT 4.6* 4.0*   ALKPHOS 157* 123     No results for input(s): INR in the last 72 hours. Recent Labs     11/23/21 0913   CKTOTAL 213*       Urinalysis:      Lab Results   Component Value Date    NITRU Negative 11/23/2021    BLOODU Negative 11/23/2021    SPECGRAV 1.015 11/23/2021    GLUCOSEU Negative 11/23/2021       Radiology:  XR ABDOMEN (KUB) (SINGLE AP VIEW)   Final Result   Enteric tube tip and side port project over the stomach. CT ABDOMEN PELVIS W IV CONTRAST Additional Contrast? None   Final Result   1. Cirrhosis with evidence of portal hypertension including multiple upper   varices including distal esophageal and gastric varices as well as a   spontaneous splenorenal shunt. Mild third-spacing with edematous changes   throughout the abdomen and subcutaneous soft tissues. 2. Cholelithiasis with no acute features. 3. Distention with mural thickening in the duodenal sweep and proximal small   bowel. There is no evidence of obstruction. Findings may be related to a   duodenitis and enteritis.    4. Mural thickening of the distal esophagus with evidence of reflux, likely a   reflux esophagitis. CT HEAD WO CONTRAST   Final Result   Atrophy and mild small vessel ischemic disease. XR CHEST PORTABLE   Final Result   No radiographic evidence of acute pulmonary disease.                  Assessment/Plan:    Active Hospital Problems    Diagnosis     Hepatic encephalopathy ([facility] [address] [K72.90]          Hepatic encephalopathy- with ammonia of 176 on arrival, CThead was unremarkable for bleed  -GI consulted, s/p EGD 11/24(noted nonbleeding gastric dieulafoy lesion, endoclipped)  -NG placed in ER, lactulose will need to be ordered  -ppi ggt  -octreotide ggt  -ICU care     Anemia- with concern for coffee ground emesis and upper GIB(?variceal)  -trended h/h  -on ppi ggt  -2u prbc ordered in ER  -tele  -Iv rocephin started 11/23     Lactic acidosis- concern related to above anemia, CTa/p without obvious pathology  -trended labs  -ivf bolus given     HTN- per emr,not on meds  -monitor VS     transaminitis/Cirrhosis- sees Dr. Josi Smith, apparently was taking rifaximin per emr  -started lactulose via NG on 11/24        DVT Prophylaxis: scd  Diet: Diet NPO Exceptions are: Ice Chips  Code Status: Full Code      PT/OT Eval Status: not ordered     Dispo - icu care, guarded prognosis    Vernon Oliveira MD

## 2021-11-24 NOTE — FLOWSHEET NOTE
0800 Assessment complete, see note for details. [id number] Dr. Arturo Guan paged regarding Pt's am labs and Pt's current condition to include difficulty in getting pain response from Pt with sternal rub.

## 2021-11-24 NOTE — PROGRESS NOTES
[facility] called wants to perform an EEG this morning at 6:30.  RN called family, received consent for procedure. Answered all questions they had appropriately.    RN sent rapid covid test.

## 2021-11-24 NOTE — FLOWSHEET NOTE
1843 MD at bedside for intubation. 1846 Etomidate 10 mg administered. 1847 intubated, coupus amounts of secretions noted. 1858 re-intuated tube size 8.0 at the 24 roldan. 1900 A-scope.

## 2021-11-25 ENCOUNTER — APPOINTMENT (OUTPATIENT)
Dept: GENERAL RADIOLOGY | Age: 71
DRG: 377 | End: 2021-11-25
Payer: MEDICARE

## 2021-11-25 LAB
A/G RATIO: 0.8 (ref 1.1–2.2)
A/G RATIO: 0.8 (ref 1.1–2.2)
ALBUMIN SERPL-MCNC: 2.2 G/DL (ref 3.4–5)
ALBUMIN SERPL-MCNC: 2.3 G/DL (ref 3.4–5)
ALP BLD-CCNC: 84 U/L (ref 40–129)
ALP BLD-CCNC: 85 U/L (ref 40–129)
ALT SERPL-CCNC: 29 U/L (ref 10–40)
ALT SERPL-CCNC: 31 U/L (ref 10–40)
AMMONIA: 75 UMOL/L (ref 11–51)
ANION GAP SERPL CALCULATED.3IONS-SCNC: 3 MMOL/L (ref 3–16)
ANION GAP SERPL CALCULATED.3IONS-SCNC: 9 MMOL/L (ref 3–16)
AST SERPL-CCNC: 54 U/L (ref 15–37)
AST SERPL-CCNC: 62 U/L (ref 15–37)
BASE EXCESS ARTERIAL: -3 (ref -3–3)
BASOPHILS ABSOLUTE: 0 K/UL (ref 0–0.2)
BASOPHILS RELATIVE PERCENT: 0.3 %
BILIRUB SERPL-MCNC: 4 MG/DL (ref 0–1)
BILIRUB SERPL-MCNC: 4.4 MG/DL (ref 0–1)
BUN BLDV-MCNC: 31 MG/DL (ref 7–20)
BUN BLDV-MCNC: 31 MG/DL (ref 7–20)
CALCIUM SERPL-MCNC: 7.6 MG/DL (ref 8.3–10.6)
CALCIUM SERPL-MCNC: 7.7 MG/DL (ref 8.3–10.6)
CHLORIDE BLD-SCNC: 117 MMOL/L (ref 99–110)
CHLORIDE BLD-SCNC: 118 MMOL/L (ref 99–110)
CO2: 18 MMOL/L (ref 21–32)
CO2: 20 MMOL/L (ref 21–32)
CREAT SERPL-MCNC: <0.5 MG/DL (ref 0.6–1.2)
CREAT SERPL-MCNC: <0.5 MG/DL (ref 0.6–1.2)
EOSINOPHILS ABSOLUTE: 0.1 K/UL (ref 0–0.6)
EOSINOPHILS RELATIVE PERCENT: 1.2 %
GFR AFRICAN AMERICAN: >60
GFR AFRICAN AMERICAN: >60
GFR NON-AFRICAN AMERICAN: >60
GFR NON-AFRICAN AMERICAN: >60
GLUCOSE BLD-MCNC: 119 MG/DL (ref 70–99)
GLUCOSE BLD-MCNC: 123 MG/DL (ref 70–99)
HCO3 ARTERIAL: 21.2 MMOL/L (ref 21–29)
HCT VFR BLD CALC: 19.6 % (ref 36–48)
HCT VFR BLD CALC: 21.5 % (ref 36–48)
HCT VFR BLD CALC: 27.1 % (ref 36–48)
HCT VFR BLD CALC: 27.8 % (ref 36–48)
HEMOGLOBIN: 6.5 G/DL (ref 12–16)
HEMOGLOBIN: 7 G/DL (ref 12–16)
HEMOGLOBIN: 9 G/DL (ref 12–16)
HEMOGLOBIN: 9.2 G/DL (ref 12–16)
INR BLD: 2.33 (ref 0.88–1.12)
LACTIC ACID: 1.6 MMOL/L (ref 0.4–2)
LACTIC ACID: 2.9 MMOL/L (ref 0.4–2)
LYMPHOCYTES ABSOLUTE: 1 K/UL (ref 1–5.1)
LYMPHOCYTES RELATIVE PERCENT: 13.2 %
MAGNESIUM: 2 MG/DL (ref 1.8–2.4)
MAGNESIUM: 2.3 MG/DL (ref 1.8–2.4)
MCH RBC QN AUTO: 27.5 PG (ref 26–34)
MCHC RBC AUTO-ENTMCNC: 33.3 G/DL (ref 31–36)
MCV RBC AUTO: 82.6 FL (ref 80–100)
MONOCYTES ABSOLUTE: 0.4 K/UL (ref 0–1.3)
MONOCYTES RELATIVE PERCENT: 6.1 %
NEUTROPHILS ABSOLUTE: 5.8 K/UL (ref 1.7–7.7)
NEUTROPHILS RELATIVE PERCENT: 79.2 %
O2 SAT, ARTERIAL: 99 % (ref 93–100)
PCO2 ARTERIAL: 29.5 MM HG (ref 35–45)
PDW BLD-RTO: 19.4 % (ref 12.4–15.4)
PERFORMED ON: ABNORMAL
PH ARTERIAL: 7.46 (ref 7.35–7.45)
PLATELET # BLD: 90 K/UL (ref 135–450)
PLATELET SLIDE REVIEW: ABNORMAL
PMV BLD AUTO: 8.4 FL (ref 5–10.5)
PO2 ARTERIAL: 119 MM HG (ref 75–108)
POC POTASSIUM: 3 MMOL/L (ref 3.5–5.1)
POC SAMPLE TYPE: ABNORMAL
POTASSIUM REFLEX MAGNESIUM: 3.3 MMOL/L (ref 3.5–5.1)
POTASSIUM REFLEX MAGNESIUM: 3.4 MMOL/L (ref 3.5–5.1)
POTASSIUM SERPL-SCNC: 3.7 MMOL/L (ref 3.5–5.1)
PROTHROMBIN TIME: 27.3 SEC (ref 9.9–12.7)
RBC # BLD: 2.37 M/UL (ref 4–5.2)
SLIDE REVIEW: ABNORMAL
SODIUM BLD-SCNC: 141 MMOL/L (ref 136–145)
SODIUM BLD-SCNC: 144 MMOL/L (ref 136–145)
TCO2 ARTERIAL: 22 MMOL/L
TOTAL PROTEIN: 5.1 G/DL (ref 6.4–8.2)
TOTAL PROTEIN: 5.1 G/DL (ref 6.4–8.2)
WBC # BLD: 7.3 K/UL (ref 4–11)

## 2021-11-25 PROCEDURE — 6360000002 HC RX W HCPCS: Performed by: INTERNAL MEDICINE

## 2021-11-25 PROCEDURE — 6370000000 HC RX 637 (ALT 250 FOR IP): Performed by: INTERNAL MEDICINE

## 2021-11-25 PROCEDURE — 2700000000 HC OXYGEN THERAPY PER DAY

## 2021-11-25 PROCEDURE — 99291 CRITICAL CARE FIRST HOUR: CPT | Performed by: INTERNAL MEDICINE

## 2021-11-25 PROCEDURE — 36415 COLL VENOUS BLD VENIPUNCTURE: CPT

## 2021-11-25 PROCEDURE — 85018 HEMOGLOBIN: CPT

## 2021-11-25 PROCEDURE — 85025 COMPLETE CBC W/AUTO DIFF WBC: CPT

## 2021-11-25 PROCEDURE — 2580000003 HC RX 258: Performed by: INTERNAL MEDICINE

## 2021-11-25 PROCEDURE — 83605 ASSAY OF LACTIC ACID: CPT

## 2021-11-25 PROCEDURE — 94761 N-INVAS EAR/PLS OXIMETRY MLT: CPT

## 2021-11-25 PROCEDURE — 83735 ASSAY OF MAGNESIUM: CPT

## 2021-11-25 PROCEDURE — C9113 INJ PANTOPRAZOLE SODIUM, VIA: HCPCS | Performed by: INTERNAL MEDICINE

## 2021-11-25 PROCEDURE — 36430 TRANSFUSION BLD/BLD COMPNT: CPT

## 2021-11-25 PROCEDURE — 82803 BLOOD GASES ANY COMBINATION: CPT

## 2021-11-25 PROCEDURE — 85610 PROTHROMBIN TIME: CPT

## 2021-11-25 PROCEDURE — 80053 COMPREHEN METABOLIC PANEL: CPT

## 2021-11-25 PROCEDURE — 84132 ASSAY OF SERUM POTASSIUM: CPT

## 2021-11-25 PROCEDURE — 94003 VENT MGMT INPAT SUBQ DAY: CPT

## 2021-11-25 PROCEDURE — 2000000000 HC ICU R&B

## 2021-11-25 PROCEDURE — 85014 HEMATOCRIT: CPT

## 2021-11-25 PROCEDURE — 71045 X-RAY EXAM CHEST 1 VIEW: CPT

## 2021-11-25 PROCEDURE — 36556 INSERT NON-TUNNEL CV CATH: CPT

## 2021-11-25 PROCEDURE — 2500000003 HC RX 250 WO HCPCS: Performed by: INTERNAL MEDICINE

## 2021-11-25 PROCEDURE — 82140 ASSAY OF AMMONIA: CPT

## 2021-11-25 PROCEDURE — 02HV33Z INSERTION OF INFUSION DEVICE INTO SUPERIOR VENA CAVA, PERCUTANEOUS APPROACH: ICD-10-PCS | Performed by: INTERNAL MEDICINE

## 2021-11-25 RX ORDER — 0.9 % SODIUM CHLORIDE 0.9 %
1000 INTRAVENOUS SOLUTION INTRAVENOUS ONCE
Status: COMPLETED | OUTPATIENT
Start: 2021-11-25 | End: 2021-11-25

## 2021-11-25 RX ORDER — 0.9 % SODIUM CHLORIDE 0.9 %
500 INTRAVENOUS SOLUTION INTRAVENOUS ONCE
Status: COMPLETED | OUTPATIENT
Start: 2021-11-25 | End: 2021-11-25

## 2021-11-25 RX ORDER — SODIUM CHLORIDE 9 MG/ML
INJECTION, SOLUTION INTRAVENOUS PRN
Status: DISCONTINUED | OUTPATIENT
Start: 2021-11-25 | End: 2021-11-26

## 2021-11-25 RX ORDER — POTASSIUM CHLORIDE 29.8 MG/ML
20 INJECTION INTRAVENOUS PRN
Status: DISCONTINUED | OUTPATIENT
Start: 2021-11-25 | End: 2021-12-04 | Stop reason: HOSPADM

## 2021-11-25 RX ADMIN — POTASSIUM CHLORIDE 20 MEQ: 400 INJECTION, SOLUTION INTRAVENOUS at 08:49

## 2021-11-25 RX ADMIN — RIFAXIMIN 550 MG: 550 TABLET ORAL at 08:50

## 2021-11-25 RX ADMIN — OCTREOTIDE ACETATE 50 MCG/HR: 500 INJECTION, SOLUTION INTRAVENOUS; SUBCUTANEOUS at 06:25

## 2021-11-25 RX ADMIN — PROPOFOL 35 MCG/KG/MIN: 10 INJECTION, EMULSION INTRAVENOUS at 08:54

## 2021-11-25 RX ADMIN — SODIUM CHLORIDE, PRESERVATIVE FREE 10 ML: 5 INJECTION INTRAVENOUS at 21:21

## 2021-11-25 RX ADMIN — Medication 20 G: at 18:22

## 2021-11-25 RX ADMIN — FENTANYL CITRATE 50 MCG/HR: 50 INJECTION, SOLUTION INTRAMUSCULAR; INTRAVENOUS at 00:16

## 2021-11-25 RX ADMIN — CEFTRIAXONE SODIUM 1000 MG: 1 INJECTION, POWDER, FOR SOLUTION INTRAMUSCULAR; INTRAVENOUS at 18:22

## 2021-11-25 RX ADMIN — SODIUM CHLORIDE 1000 ML: 9 INJECTION, SOLUTION INTRAVENOUS at 04:20

## 2021-11-25 RX ADMIN — DEXTROSE MONOHYDRATE 4 MCG/MIN: 50 INJECTION, SOLUTION INTRAVENOUS at 07:59

## 2021-11-25 RX ADMIN — SODIUM CHLORIDE 8 MG/HR: 9 INJECTION, SOLUTION INTRAVENOUS at 08:59

## 2021-11-25 RX ADMIN — Medication 20 G: at 06:17

## 2021-11-25 RX ADMIN — MUPIROCIN: 20 OINTMENT TOPICAL at 21:21

## 2021-11-25 RX ADMIN — Medication 20 G: at 01:01

## 2021-11-25 RX ADMIN — SODIUM CHLORIDE 500 ML: 9 INJECTION, SOLUTION INTRAVENOUS at 06:23

## 2021-11-25 RX ADMIN — POTASSIUM CHLORIDE 20 MEQ: 400 INJECTION, SOLUTION INTRAVENOUS at 10:01

## 2021-11-25 RX ADMIN — PROPOFOL 50 MCG/KG/MIN: 10 INJECTION, EMULSION INTRAVENOUS at 00:31

## 2021-11-25 RX ADMIN — SODIUM CHLORIDE, PRESERVATIVE FREE 10 ML: 5 INJECTION INTRAVENOUS at 08:51

## 2021-11-25 RX ADMIN — MUPIROCIN: 20 OINTMENT TOPICAL at 08:50

## 2021-11-25 RX ADMIN — POTASSIUM CHLORIDE 20 MEQ: 400 INJECTION, SOLUTION INTRAVENOUS at 12:13

## 2021-11-25 RX ADMIN — RIFAXIMIN 550 MG: 550 TABLET ORAL at 21:21

## 2021-11-25 RX ADMIN — Medication 20 G: at 13:12

## 2021-11-25 RX ADMIN — PHYTONADIONE 5 MG: 10 INJECTION, EMULSION INTRAMUSCULAR; INTRAVENOUS; SUBCUTANEOUS at 13:12

## 2021-11-25 RX ADMIN — POTASSIUM CHLORIDE 20 MEQ: 400 INJECTION, SOLUTION INTRAVENOUS at 07:03

## 2021-11-25 ASSESSMENT — PULMONARY FUNCTION TESTS
PIF_VALUE: 17
PIF_VALUE: 19
PIF_VALUE: 17
PIF_VALUE: 17
PIF_VALUE: 15
PIF_VALUE: 16
PIF_VALUE: 17
PIF_VALUE: 19
PIF_VALUE: 17
PIF_VALUE: 18
PIF_VALUE: 17
PIF_VALUE: 18
PIF_VALUE: 15
PIF_VALUE: 16
PIF_VALUE: 15
PIF_VALUE: 17
PIF_VALUE: 18
PIF_VALUE: 16
PIF_VALUE: 18
PIF_VALUE: 17
PIF_VALUE: 16
PIF_VALUE: 13
PIF_VALUE: 17
PIF_VALUE: 15
PIF_VALUE: 17
PIF_VALUE: 14
PIF_VALUE: 17
PIF_VALUE: 18
PIF_VALUE: 17
PIF_VALUE: 17
PIF_VALUE: 18
PIF_VALUE: 19
PIF_VALUE: 17

## 2021-11-25 NOTE — PROGRESS NOTES
Pulmonary & Critical Care Medicine ICU Progress Note    Admit Date: 2021  PCP: Blake Goins    CC:  Respiratory failure   Events of Last 24 hours:    She was intubated last night on   Started on levophed last night at 6 mcg/min  Hemoglobin dropped to 7.0    Vitals:  Tmax:  VITALS:  BP (!) 86/39   Pulse 72   Temp 96.6 °F (35.9 °C) (Bladder)   Resp 16   Ht 5' 5\" (1.651 m)   Wt 123 lb 7.3 oz (56 kg)   SpO2 100%   BMI 20.54 kg/m²   24HR INTAKE/OUTPUT:    Intake/Output Summary (Last 24 hours) at 2021 0906  Last data filed at 2021 5928  Gross per 24 hour   Intake 5011.49 ml   Output 2493 ml   Net 2518.49 ml     CURRENT PULSE OXIMETRY:  SpO2: 100 %  24HR PULSE OXIMETRY RANGE:  SpO2  Av.1 %  Min: 87 %  Max: 100 %      Vent Settings:  Vent Mode: AC/VC+ Rate Set: 16 bmp/Vt Ordered: 450 mL/ /FiO2 : 30 %  PEEP5  Recent Labs     21  0828   PHART 7.532* 7.464*   BSI1ZSV 27.1* 29.5*   PO2ART 173.6* 119.0*         EXAM:  General: No distress. Sedated. Eyes: PERRL. No sclera icterus. No conjunctival injection. ENT: ETT in place  Neck: Trachea midline. Normal thyroid. Resp: No accessory muscle use. CV: Regular rate. Regular rhythm. No mumur or rub. +1 edema   GI: Non-tender. Non-distended. No masses. No organmegaly. Normal bowel sounds. Skin: Warm and dry. No nodule on exposed extremities. No rash on exposed extremities. Neuro:Intubated, sedated   Psych:  Intubated, sedated      IV:   norepinephrine 6 mcg/min (21 0803)    propofol 35 mcg/kg/min (21 0854)    fentaNYL (SUBLIMAZE) infusion 50 mcg/hr (21 4094)    pantoprazole 8 mg/hr (21 0885)    sodium chloride      octreotide (SANDOSTATIN) infusion 50 mcg/hr (21 0620)    sodium chloride 25 mL (21 765)         Scheduled Meds:     mupirocin   Nasal BID    lactulose  20 g Oral 4 times per day    rifaximin  550 mg Oral BID    [START ON 2021] pantoprazole  40 mg Oral QAM AC    sodium chloride flush  5-40 mL IntraVENous 2 times per day    cefTRIAXone (ROCEPHIN) IV  1,000 mg IntraVENous Q24H         Diet: Diet NPO Exceptions are: Ice Chips     Results:  CBC:   Recent Labs     11/23/21  0913 11/23/21 1847 11/24/21  1031 11/24/21  1211 11/24/21  1828 11/25/21  0700 11/25/21  0752   WBC 16.3*  --  16.3*  --   --  7.3  --    HGB 7.6*   < > 8.7*   < > 9.1* 6.5* 7.0*   HCT 25.2*   < > 26.4*   < > 28.0* 19.6* 21.5*   MCV 81.7  --  81.0  --   --  82.6  --      --  116*  --   --  90*  --     < > = values in this interval not displayed. BMP:   Recent Labs     11/23/21 1847 11/24/21 1828 11/25/21  0332    143 144   K 4.2 3.5 3.3*    115* 117*   CO2 19* 19* 18*   BUN 28* 26* 31*   CREATININE <0.5* <0.5* <0.5*     LIVER PROFILE:   Recent Labs     11/23/21 1847 11/24/21 1828 11/25/21  0332   AST 82* 73* 62*   ALT 36 32 31   BILITOT 4.0* 4.3* 4.4*   ALKPHOS 123 83 85     PT/INR: No results for input(s): PROTIME, INR in the last 72 hours. APTT: No results for input(s): APTT in the last 72 hours. UA:  Recent Labs     11/23/21  0938   COLORU Yellow   PHUR 7.5   CLARITYU Clear   SPECGRAV 1.015   LEUKOCYTESUR Negative   UROBILINOGEN 1.0   BILIRUBINUR Negative   BLOODU Negative   GLUCOSEU Negative       Assessment/Plan:  The patient is a 80 y.o. female with     Acute respiratory failure on mechanical vent support. Intubated on 11/24 as she was aspirating due to severe encephalopathy   , RR 16, FiO2 30, PEEP 5  Hepatic encephalopathy   Respiratory alkalosis due to decompensated liver cirrhosis   ABG 7.46/29/119  UGIB:  EGD with coffee ground coated gastric mucosa but no active bleed. She had Dieulafoy lesion but was not bleeding and was clipped. She has rectal tube, had one lactulose enema yesterday. Stool is bloody   Anemia: HGB dropped from 9 to 7  Lactic acidemia - improved   Leukocytosis with left shift - better   Hypotension requiring levophed at 6.   This is thought to be due to sedation however in the setting of hemoglobin drop this can be due to hypovolemia. Will assess after blood transfusion. Lactic acid is within normal.      Transfuse one unit of PRBC  Monitor H&H  Lactulose/Rifaximin   Continue PPI and ceftriaxone  Discussed with GI  Wean down sedation    updated    Pt has a high probability of imminent or life-threatening deterioration requiring close monitoring, and highly complex decision-making and/or interventions of high intensity to assess, manipulate, and support his critical organ systems to prevent a likely inevitable decline which could occur if left untreated.      A total critical care time 35 minutes was used. This includes but not limited to examining patient, collaborating with other physicians, monitoring vital signs, telemetry, continuous pulse oximetry, and clinical response to IV medications, documentation time, review and interpretation of laboratory and radiological data, review of nursing notes and old record review. This time excludes any time that may have been spent performing procedures for life threatening organ failure.     Maulik Giron MD, MD

## 2021-11-25 NOTE — PROGRESS NOTES
Phlebotomy has had multiple attempts to stick pt for labs without success. Notified MD. Pt receiving 1L NS bolus for JAY. MD at bedside to place central line.

## 2021-11-25 NOTE — PROGRESS NOTES
Ventilator frequency decreased from 16 to 12 breaths/min at this time, per verbal order from Dr. Ramya Kingsley. Orders updated to reflect this change.      Electronically signed by Giselle Alvarez RCP, RRT, RRT-ACCS on 11/25/2021 at 11:21 AM

## 2021-11-25 NOTE — PROGRESS NOTES
11/24/21 1930   Vent Information   $Ventilation $Initial Day   Equipment Changed HME   Vent Type 980   Vent Mode AC/VC+   Vt Ordered 450 mL   Rate Set 16 bmp   Peak Flow 70 L/min   FiO2  60 %   SpO2 100 %   SpO2/FiO2 ratio 166.67   Sensitivity 3   PEEP/CPAP 5   I Time/ I Time % 0.9 s   Humidification Source HME   Vent Patient Data   Peak Inspiratory Pressure 16 cmH2O   Mean Airway Pressure 8.9 cmH20   Rate Measured 20 br/min   Vt Exhaled 445 mL   Minute Volume 8.14 Liters   I:E Ratio 1:3.2   Plateau Pressure 13 ARW87   Static Compliance 48 mL/cmH2O   Cough/Sputum   Sputum How Obtained Endotracheal; Oral   Cough None   Sputum Amount Large   Sputum Color Red; Cloudy   Tenacity Thick   Spontaneous Breathing Trial (SBT) RT Doc   Pulse 103   Breath Sounds   Right Upper Lobe Clear   Right Middle Lobe Clear   Right Lower Lobe Clear   Left Upper Lobe Clear   Left Lower Lobe Clear   Additional Respiratory  Assessments   Resp 14   Alarm Settings   High Pressure Alarm 55 cmH2O   Press Low Alarm 8.5 cmH2O   Low Minute Volume Alarm 2 L/min   High Respiratory Rate 40 br/min   Resp Rate Low Alarm 10   Low Exhaled Vt  200 mL

## 2021-11-25 NOTE — PROGRESS NOTES
11/25/21 1124   Vent Information   Vent Type 980   Vent Mode AC/VC+   Vt Ordered 450 mL   Rate Set 12 bmp   Pressure Support 0 cmH20   FiO2  30 %   SpO2 100 %   SpO2/FiO2 ratio 333.33   Sensitivity 3   PEEP/CPAP 5   I Time/ I Time % 1.5 s   Vent Patient Data   Peak Inspiratory Pressure 16 cmH2O   Mean Airway Pressure 8.3 cmH20   Rate Measured 12 br/min   Vt Exhaled 455 mL   Minute Volume 5.47 Liters   I:E Ratio 1:2.30   Cough/Sputum   Sputum How Obtained None   Spontaneous Breathing Trial (SBT) RT Doc   Pulse 79   Breath Sounds   Right Upper Lobe Clear   Right Middle Lobe Diminished   Right Lower Lobe Diminished   Left Upper Lobe Clear   Left Lower Lobe Diminished   Additional Respiratory  Assessments   Resp 12   Position Semi-Smallwood's   Alarm Settings   High Pressure Alarm 40 cmH2O   Low Minute Volume Alarm 3 L/min   Apnea (secs) 20 secs   High Respiratory Rate 40 br/min   Low Exhaled Vt  200 mL   Patient Observation   Observations ambu @ bedside   ETT (adult)   Placement Date/Time: 11/24/21 1845   Preoxygenation: Yes  Mask Ventilation: Ventilated by mask (1); Ventilated by mask with oral airway (2)  Technique: Direct laryngoscopy  Type: Cuffed  Tube Size: 8 mm  Laryngoscope: GlideScope  Blade Size: 3  Locati. ..    Secured at 25 cm   Measured From Lips   ET Placement Left   Secured By Commercial tube frankel   Site Condition Dry

## 2021-11-25 NOTE — PROGRESS NOTES
11/24/21 2330   Vent Information   Vent Type 980   Vent Mode AC/VC+   Vt Ordered 450 mL   Rate Set 16 bmp   FiO2  40 %   SpO2 100 %   SpO2/FiO2 ratio 250   Sensitivity 3   PEEP/CPAP 5   I Time/ I Time % 1 s   Humidification Source HME   Vent Patient Data   Peak Inspiratory Pressure 13 cmH2O   Mean Airway Pressure 8 cmH20   Rate Measured 22 br/min   Vt Exhaled 541 mL   Minute Volume 8.1 Liters   I:E Ratio 1:2.60   Cough/Sputum   Sputum How Obtained Endotracheal   Cough Productive   Sputum Amount Large   Sputum Color Bright red   Tenacity Thick   Spontaneous Breathing Trial (SBT) RT Doc   Pulse 91   Breath Sounds   Right Upper Lobe Rhonchi   Right Middle Lobe Diminished   Right Lower Lobe Diminished   Left Upper Lobe Rhonchi   Left Lower Lobe Diminished   Alarm Settings   High Pressure Alarm 55 cmH2O   Low Minute Volume Alarm 2 L/min   High Respiratory Rate 40 br/min   Low Exhaled Vt  200 mL   ETT (adult)   Placement Date/Time: 11/24/21 1845   Preoxygenation: Yes  Mask Ventilation: Ventilated by mask (1); Ventilated by mask with oral airway (2)  Technique: Direct laryngoscopy  Type: Cuffed  Tube Size: 8 mm  Laryngoscope: GlideScope  Blade Size: 3  Locati. ..    Secured at 25 cm   Measured From Lips   ET Placement Left   Secured By Commercial tube frankel   Site Condition Dry   Cuff Pressure 24 cm H2O

## 2021-11-25 NOTE — PROGRESS NOTES
Order received to transfuse 1 unit of PRBC. Consent verified. Blood verified by 2 RN and is currently transfusing with no adverse reaction.  Will continue to monitor

## 2021-11-25 NOTE — PROGRESS NOTES
PROGRESS NOTE  S:71 yrs Patient  admitted on 11/23/2021 with Hepatic encephalopathy ([facility] [address] [K72.90]  Anemia, unspecified type [D64.9] . Today She is intubated and sedated. Wasn't intubated last night due to increased secretions. Hemoglobin has dropped  . NG tube shows no active bleeding however rectal tube shows bloody output.       Exam:   Vitals:    11/25/21 0945   BP: (!) 107/46   Pulse: 79   Resp:    Temp:    SpO2: 100%      General appearance: Intubated and sedated  HEENT: EOMI NG tube and ET tube in place  Neck: no adenopathy, no carotid bruit, no JVD, supple, symmetrical, trachea midline and thyroid not enlarged, symmetric, no tenderness/mass/nodules  Lungs: clear to auscultation bilaterally  Heart: regular rate and rhythm, S1, S2 normal, no murmur, click, rub or gallop  Abdomen: soft, non-tender; bowel sounds normal; no masses,  no organomegaly   Rectal tube with bloody discharge  Extremities: extremities normal, atraumatic, no cyanosis or edema     Medications:   Current Facility-Administered Medications   Medication Dose Route Frequency Provider Last Rate Last Admin    norepinephrine (LEVOPHED) 16 mg in dextrose 5 % 250 mL infusion  2-100 mcg/min IntraVENous Continuous Toniad RAMIREZ Ng, DO 7.5 mL/hr at 11/25/21 0938 8 mcg/min at 11/25/21 0938    potassium chloride 20 mEq/50 mL IVPB (Central Line)  20 mEq IntraVENous PRN Vazquez Ng, DO 50 mL/hr at 11/25/21 1001 20 mEq at 11/25/21 1001    0.9 % sodium chloride infusion   IntraVENous PRN Adolfo Ruff MD        mupirocin (BACTROBAN) 2 % ointment   Nasal BID Carolann Waterman MD   Given at 11/25/21 0850    lactulose (CHRONULAC) 10 GM/15ML solution 20 g  20 g Oral 4 times per day Carolann Waterman MD   20 g at 11/25/21 0617    propofol injection  5-50 mcg/kg/min IntraVENous Titrated Adolfo Ruff MD 10.1 mL/hr at 11/25/21 0939 30 mcg/kg/min at 11/25/21 0939    rifaximin (XIFAXAN) tablet 550 mg  550 mg Oral BID Adolfo Ruff MD   550 mg at 11/25/21 0850    fentaNYL (SUBLIMAZE) 1,000 mcg in sodium chloride 0.9 % 100 mL infusion  12.5-200 mcg/hr IntraVENous Continuous Vazquez Ng DO 2.5 mL/hr at 11/25/21 0935 25 mcg/hr at 11/25/21 0935    pantoprazole (PROTONIX) 80 mg in sodium chloride 0.9 % 100 mL infusion  8 mg/hr IntraVENous Continuous Ro Danielson MD 10 mL/hr at 11/25/21 0859 8 mg/hr at 11/25/21 0859    [START ON 11/26/2021] pantoprazole (PROTONIX) tablet 40 mg  40 mg Oral QAM AC Ro Danielson MD        0.9 % sodium chloride infusion   IntraVENous PRN Ro Danielson MD        octreotide (SANDOSTATIN) 500 mcg in sodium chloride 0.9 % 100 mL infusion  50 mcg/hr IntraVENous Continuous Ro Danielson MD 10 mL/hr at 11/25/21 0625 50 mcg/hr at 11/25/21 0625    sodium chloride flush 0.9 % injection 5-40 mL  5-40 mL IntraVENous 2 times per day Ro Danielson MD   10 mL at 11/25/21 0851    sodium chloride flush 0.9 % injection 5-40 mL  5-40 mL IntraVENous PRN Ro Danielson MD        0.9 % sodium chloride infusion  25 mL IntraVENous PRN Ro Danielson  mL/hr at 11/23/21 1841 25 mL at 11/23/21 1841    ondansetron (ZOFRAN-ODT) disintegrating tablet 4 mg  4 mg Oral Q8H PRN Ro Danielson MD        Or    ondansetron [facility]) injection 4 mg  4 mg IntraVENous Q6H PRN Ro Danielson MD        acetaminophen (TYLENOL) tablet 650 mg  650 mg Oral Q6H PRN Ro Danielson MD        Or   Marley Alexis acetaminophen (TYLENOL) suppository 650 mg  650 mg Rectal Q6H PRN Ro Danielson MD        cefTRIAXone (ROCEPHIN) 1000 mg IVPB in 50 mL D5W minibag  1,000 mg IntraVENous Q24H Ro Danielson MD   Stopped at 11/24/21 1838           Labs:  CBC:   Recent Labs     11/23/21  0913 11/23/21  1847 11/24/21  1031 11/24/21  1211 11/24/21  1828 11/25/21  0700 11/25/21  0752   WBC 16.3*  --  16.3*  --   --  7.3  --    HGB 7.6*   < > 8.7*   < > 9.1* 6.5* 7.0*   HCT 25.2*   < > 26.4*   < > 28.0* 19.6* 21.5*   MCV 81.7  --  81.0  --   --  82.6  --      --  116*  --   --  90*  --     < > = values in this interval not displayed. BMP:   Recent Labs     11/23/21 1847 11/24/21 1828 11/25/21  0332    143 144   K 4.2 3.5 3.3*    115* 117*   CO2 19* 19* 18*   BUN 28* 26* 31*   CREATININE <0.5* <0.5* <0.5*     LIVER PROFILE:   Recent Labs     11/23/21 1847 11/24/21 1828 11/25/21  0332   AST 82* 73* 62*   ALT 36 32 31   PROT 6.1* 5.0* 5.1*   BILITOT 4.0* 4.3* 4.4*   ALKPHOS 123 83 85     PT/INR: No results for input(s): INR in the last 72 hours. Invalid input(s): PT    IMAGING:      Impression:80year-old female with history of alcoholic cirrhosis admitted with encephalopathy and GI bleed. EGD yesterday showed no varices but Deulifouy lesion which was clipped. Required intubation. Last night. Hemoglobin dropped from 9-7. Has bloody discharge from a rectal tube, but none from NG tube. Recommendation:  1. Transfuse 1 unit packed red blood cells  2. Follow hematocrit  3. Continue lactulose and if awakens will resume rifaximin  4. May require colonoscopy if hemoglobin continues to drop  5.  Stop octreotide      Micah Pritchard MD, MD  10:22 AM 11/25/2021

## 2021-11-25 NOTE — PROCEDURES
INDICATION: Hepatic encephalopathy, history of gastric varices, limited access    ATTENDING PHYSICIAN:     PROCEDURE: Central venous line placement right internal jugular vein    CONSENT:  The procedure, risks, and benefits were explained to family and consent was obtained. TIME OUT:  The patient and procedure were properly identified with the nurse in the room. STERILE PREP:  Full maximum sterile field/barrier technique was followed (with cap and mask and sterile gown and sterile gloves and large sterile sheet and hand hygeine and 2% chlorhexidine for cutaneous antisepsis). LOCAL ANESTHETIC:   Aqueous lidocaine 5cc    PROCEDURE:   Using direct ultrasound guidance, a right  internal jugular vein central venous catheter was placed using a modified seldinger technique without difficulty. Excellent return of non-pulsatile, dark venous blood from all lumens. All lumens were flushed with normal saline. Minimal bleeding occurred and there was hemostasis prior to conclusion of procedure. Catheter was sutured in place and a sterile dressing with biopatch was placed.     COMPLICATIONS:  None  Estimated blood loss: 10 cc    CHEST XRAY REVIEWED: Post-procedure chest x-ray is pending at this time

## 2021-11-25 NOTE — PROCEDURES
Intubation:    Indication: acute respiratory failure, unable to protect her airway with suspected aspiration. Anesthesia: etomidate 10mg IV  ASA IV  Mallampati: can not assess due to mental status     Procedure:  Patient was preoxygenated with Ambu bag and 100% O2  Glidescope was inserted into the oral cavity and placed in proper position. Vocal cords were visualized. ETT size 8.0 was passed through the vocal cords on the 2nd attempt and stylet was removed. There was excessive secretions on the samantha and laryngopharynx. There was bilateral breath sounds and positive CO2 colorimetric detector. Tube was fixed at 25cm at the lip. There was no immediate complication.      EBL: none

## 2021-11-25 NOTE — PROGRESS NOTES
11/25/21 0334   Vent Information   Equipment Changed HME   Vent Type 980   Vent Mode AC/VC+   Vt Ordered 450 mL   Rate Set 16 bmp   FiO2  40 %   SpO2 100 %   SpO2/FiO2 ratio 250   Sensitivity 3   PEEP/CPAP 5   I Time/ I Time % 1 s   Humidification Source HME   Vent Patient Data   Peak Inspiratory Pressure 14 cmH2O   Mean Airway Pressure 8.5 cmH20   Rate Measured 22 br/min   Vt Exhaled 504 mL   Minute Volume 11 Liters   I:E Ratio 1:2.20   Cough/Sputum   Sputum How Obtained Endotracheal   Cough Productive   Sputum Amount Large   Sputum Color Bright red   Tenacity Thick   Spontaneous Breathing Trial (SBT) RT Doc   Pulse 76   Breath Sounds   Right Upper Lobe Rhonchi   Right Middle Lobe Diminished   Right Lower Lobe Diminished   Left Upper Lobe Rhonchi   Left Lower Lobe Diminished   Alarm Settings   High Pressure Alarm 55 cmH2O   Low Minute Volume Alarm 2 L/min   High Respiratory Rate 40 br/min   Low Exhaled Vt  200 mL   ETT (adult)   Placement Date/Time: 11/24/21 3095   Preoxygenation: Yes  Mask Ventilation: Ventilated by mask (1); Ventilated by mask with oral airway (2)  Technique: Direct laryngoscopy  Type: Cuffed  Tube Size: 8 mm  Laryngoscope: GlideScope  Blade Size: 3  Locati. ..    Secured at 25 cm   Measured From Lips   ET Placement Right   Secured By Commercial tube frankel   Site Condition Dry   Cuff Pressure 28 cm H2O

## 2021-11-25 NOTE — PROGRESS NOTES
11/25/21 1528   Vent Information   Vent Type 980   Vent Mode AC/VC+   Vt Ordered 450 mL   Rate Set 12 bmp   Pressure Support 0 cmH20   FiO2  30 %   SpO2 99 %   SpO2/FiO2 ratio 330   Sensitivity 3   PEEP/CPAP 5   I Time/ I Time % 1.5 s   Vent Patient Data   Peak Inspiratory Pressure 15 cmH2O   Mean Airway Pressure 11 cmH20   Rate Measured 14 br/min   Vt Exhaled 513 mL   Minute Volume 8.9 Liters   I:E Ratio 1:2.30   Cough/Sputum   Sputum How Obtained None   Spontaneous Breathing Trial (SBT) RT Doc   Pulse 91   Breath Sounds   Right Upper Lobe Clear; Diminished   Right Middle Lobe Diminished   Right Lower Lobe Diminished   Left Upper Lobe Clear; Diminished   Left Lower Lobe Diminished   Additional Respiratory  Assessments   Resp 12   Position Semi-Smallwood's   Alarm Settings   High Pressure Alarm 40 cmH2O   Low Minute Volume Alarm 3 L/min   Apnea (secs) 20 secs   High Respiratory Rate 40 br/min   Low Exhaled Vt  200 mL   Patient Observation   Observations ambu @ bedside   ETT (adult)   Placement Date/Time: 11/24/21 1845   Preoxygenation: Yes  Mask Ventilation: Ventilated by mask (1); Ventilated by mask with oral airway (2)  Technique: Direct laryngoscopy  Type: Cuffed  Tube Size: 8 mm  Laryngoscope: GlideScope  Blade Size: 3  Locati. ..    Secured at 25 cm   Measured From Lips   ET Placement Right   Secured By Commercial tube frankel   Site Condition Dry

## 2021-11-25 NOTE — PROCEDURES
PROCEDURE: Fiberoptic bronchoscopy with therapeutic aspiration of copious secretions    Preprocedure diagnosis: post intubation, suspected aspiration  Post procedure diagnosis: same    Mallampati patient is intubated   ASA IV  Anesthesia:       Etomidate 10mg IV    Bronchoscope was inserted into the main airway via the ETT. Lower trachea and  bronchial trees were examined to the segmental level. There was no masses or bleeding. Mucosa was mildly inflamed, there was copious bilateral secretions consistent with aspiration with color similar to samantha and laryngopharyngeal content. It was therapeutically aspirated     The patient tolerated the procedure well.   No immediate complication    EBL: none

## 2021-11-25 NOTE — PROGRESS NOTES
Hospitalist Progress Note      PCP: Anju Vital    Date of Admission: 11/23/2021      Chief Complaint: confusion     Hospital Course:   80 y.o. female with cirrhosis, etoh use who presented to [facility] with confusion/hepatic encephalopathy and GIB. Placed in ICU. Subjective: intubated yesterday evening given encephalopathy(secure airway), remains sedated/intubated today, CVC placed overnight       Medications:  Reviewed    Infusion Medications    norepinephrine      propofol 40 mcg/kg/min (11/25/21 1930)    fentaNYL (SUBLIMAZE) infusion 50 mcg/hr (11/25/21 9604)    pantoprazole 8 mg/hr (11/25/21 7390)    sodium chloride      octreotide (SANDOSTATIN) infusion 50 mcg/hr (11/25/21 2060)    sodium chloride 25 mL (11/23/21 1841)     Scheduled Medications    mupirocin   Nasal BID    lactulose  20 g Oral 4 times per day    rifaximin  550 mg Oral BID    [START ON 11/26/2021] pantoprazole  40 mg Oral QAM AC    sodium chloride flush  5-40 mL IntraVENous 2 times per day    cefTRIAXone (ROCEPHIN) IV  1,000 mg IntraVENous Q24H     PRN Meds: potassium chloride, sodium chloride, sodium chloride flush, sodium chloride, ondansetron **OR** ondansetron, acetaminophen **OR** acetaminophen      Intake/Output Summary (Last 24 hours) at 11/25/2021 0758  Last data filed at 11/25/2021 0653  Gross per 24 hour   Intake 5011.49 ml   Output 2493 ml   Net 2518.49 ml       Physical Exam Performed:    BP (!) 101/46   Pulse 73   Temp 95.5 °F (35.3 °C) (Bladder)   Resp 16   Ht 5' 5\" (1.651 m)   Wt 123 lb 7.3 oz (56 kg)   SpO2 100%   BMI 20.54 kg/m²       General appearance:  No apparent distress, appears stated age and cooperative. Intubated/sedated  HEENT:  Normal cephalic, atraumatic without obvious deformity. Pupils equal, round, and reactive to light.  Extra ocular muscles intact. Conjunctivae/corneas clear. NG in place with signif dark red outpt  Neck: Supple, with full range of motion.  No jugular venous distention. Trachea midline. Respiratory:  inc'd respiratory effort. Clear to auscultation, bilaterally without Rales/Wheezes/Rhonchi. Cardiovascular:  Regular rate and rhythm with normal S1/S2 without murmurs, rubs or gallops. Abdomen: Soft, non-tender, non-distended with normal bowel sounds. Musculoskeletal:  No clubbing, cyanosis or edema bilaterally.  Full range of motion without deformity. Skin: Skin color, texture, turgor normal.  No rashes or lesions. Neurologic:  Neurovascularly intact without any focal sensory/motor deficits. Cranial nerves: II-XII intact, grossly non-focal.  Psychiatric:  Alert and oriented x0, thought content not appropriate, not normal insight, sedated  Capillary Refill: Brisk,3 seconds, normal  Peripheral Pulses: +2 palpable, equal bilaterally          Labs:   Recent Labs     11/23/21 0913 11/23/21 1847 11/24/21  1031 11/24/21  1211 11/24/21 1828   WBC 16.3*  --  16.3*  --   --    HGB 7.6*   < > 8.7* 8.8* 9.1*   HCT 25.2*   < > 26.4* 27.1* 28.0*     --  116*  --   --     < > = values in this interval not displayed. Recent Labs     11/23/21 1847 11/24/21 1828 11/25/21  0332    143 144   K 4.2 3.5 3.3*    115* 117*   CO2 19* 19* 18*   BUN 28* 26* 31*   CREATININE <0.5* <0.5* <0.5*   CALCIUM 7.9* 6.9* 7.6*     Recent Labs     11/23/21 1847 11/24/21  1828 11/25/21  0332   AST 82* 73* 62*   ALT 36 32 31   BILITOT 4.0* 4.3* 4.4*   ALKPHOS 123 83 85     No results for input(s): INR in the last 72 hours. Recent Labs     11/23/21 0913   CKTOTAL 213*       Urinalysis:      Lab Results   Component Value Date    NITRU Negative 11/23/2021    BLOODU Negative 11/23/2021    SPECGRAV 1.015 11/23/2021    GLUCOSEU Negative 11/23/2021       Radiology:  XR CHEST PORTABLE   Final Result   Line placement without complicating feature. XR CHEST PORTABLE   Final Result   1. The endotracheal tube tip is 3 cm above the maria g. 2. Clear lungs.          XR ABDOMEN (KUB) (SINGLE AP VIEW)   Final Result   Enteric tube side port projects over the expected region of the GE junction,   recommend advancement. XR ABDOMEN (KUB) (SINGLE AP VIEW)   Final Result   Enteric tube tip and side port project over the stomach. CT ABDOMEN PELVIS W IV CONTRAST Additional Contrast? None   Final Result   1. Cirrhosis with evidence of portal hypertension including multiple upper   varices including distal esophageal and gastric varices as well as a   spontaneous splenorenal shunt. Mild third-spacing with edematous changes   throughout the abdomen and subcutaneous soft tissues. 2. Cholelithiasis with no acute features. 3. Distention with mural thickening in the duodenal sweep and proximal small   bowel. There is no evidence of obstruction. Findings may be related to a   duodenitis and enteritis. 4. Mural thickening of the distal esophagus with evidence of reflux, likely a   reflux esophagitis. CT HEAD WO CONTRAST   Final Result   Atrophy and mild small vessel ischemic disease. XR CHEST PORTABLE   Final Result   No radiographic evidence of acute pulmonary disease.                  Assessment/Plan:    Active Hospital Problems    Diagnosis     Hepatic encephalopathy ([facility] [address] [K72.90]            Hepatic encephalopathy- with ammonia of 176 on arrival, CThead was unremarkable for bleed  -GI consulted, s/p EGD 11/24(noted nonbleeding gastric dieulafoy lesion, endoclipped)  -NG placed in ER, lactulose will need to be ordered  -ppi ggt  -octreotide ggt  -ICU care     Possible Resp failure- related to above,   -intubated 11/24 pm to secure airway given encephalopathy and concern for aspiration  -apprec pulm/cc mgmt  -CVC placed 11/25    Acute blood loss Anemia- with concern for coffee ground emesis and upper GIB(?variceal)  -trended h/h  -on ppi ggt  -2u prbc ordered in ER  -tele  -Iv rocephin started 11/23   -2u prbc 11/25    Lactic acidosis- concern related to above anemia, CTa/p without obvious pathology  -trended labs  -ivf bolus given     HTN- per emr,not on meds  -monitor VS     Transaminitis/Cirrhosis/coagulopathy- sees Bin Fritz, apparently was taking rifaximin per emr  -started lactulose via NG on 11/24   -vit K iv ordered 11/25     DVT Prophylaxis: scd  Diet: Diet NPO Exceptions are: Ice Chips  Code Status: Full Code       PT/OT Eval Status: not ordered     Dispo - icu care, guarded prognosis    Mary Jimenes MD

## 2021-11-25 NOTE — CONSULTS
Pulmonary Consult    Patient's PCP: Jhonatan Gonzalez  Referred by: Presley Elizabeth MD for upper airway secretions    HISTORY OF PRESENT ILLNESS:    This is a  80 y.o. female with PMH of liver cirrhosis and others as listed below presented to the hospital on 11/23 with altered mental status. It started the night before as fatigue and sleepiness, progressed to confusion  Patient has hx of alcoholism and poor compliance with medications. In the ED she was found to have hemoglobin of 7.6 with dark red output from NG tube for which GI was consulted. She had EGD this morning and was found to have proximal gastric Dieulafoy non bleeding lesion with coffee ground coating of gastric mucosa. At the time of evaluation this evening she was obtunded with audible upper airway sound. She did not respond to sternal rub. Past Medical / Surgical History:    Past Medical History:   Diagnosis Date    Esophagitis     Hypertension      Past Surgical History:   Procedure Laterality Date    ENDOSCOPIC ULTRASOUND (UPPER)      UPPER GASTROINTESTINAL ENDOSCOPY N/A 11/24/2021    EGD CONTROL HEMORRHAGE performed by Mamie Gilmore MD at [address] [place] [address]     Prior to Admission:    No current facility-administered medications on file prior to encounter. Current Outpatient Medications on File Prior to Encounter   Medication Sig Dispense Refill    naloxone 4 MG/0.1ML LIQD nasal spray 1 spray by Nasal route as needed for Opioid Reversal 1 each 0    cyclobenzaprine (FLEXERIL) 10 MG tablet TAKE 1 TABLET BY MOUTH EVERY 12 HOURS AS NEEDED FOR PAIN. 40 tablet 0    rifaximin (XIFAXAN) 550 MG tablet Take 1 tablet by mouth 2 times daily 60 tablet 2    pantoprazole (PROTONIX) 40 MG tablet Take 1 tablet by mouth every morning (before breakfast) 30 tablet 3       Allergies:  Azithromycin and Sulfa antibiotics    Social History:   TOBACCO:   reports that she has never smoked.  She has never used smokeless tobacco.     ETOH:   reports current alcohol use. Family History:   No family history on file. Review of Systems   Unable to perform ROS: Mental status change       PHYSICAL EXAM:  BP (!) 143/65   Pulse 102   Temp 99.8 °F (37.7 °C) (Bladder)   Resp 12   Ht 5' 5\" (1.651 m)   Wt 123 lb 7.3 oz (56 kg)   SpO2 100%   BMI 20.54 kg/m²     Physical Exam  Vitals reviewed. Constitutional:       Appearance: She is well-developed. She is ill-appearing. Comments: obtunded   HENT:      Head: Normocephalic and atraumatic. Eyes:      Pupils: Pupils are equal, round, and reactive to light. Neck:      Vascular: No JVD. Cardiovascular:      Rate and Rhythm: Normal rate and regular rhythm. Heart sounds: No murmur heard. Pulmonary:      Comments: Tachypnea  Upper airway crackles  Bilateral scattered rales  Abdominal:      General: Bowel sounds are normal.      Palpations: Abdomen is soft. Musculoskeletal:         General: No deformity. Cervical back: Neck supple. Skin:     General: Skin is warm and dry. Neurological:      Comments: No response to sternal rub       LABS:  Recent Labs     11/23/21 0913 11/23/21 1847 11/24/21  1031 11/24/21  1211 11/24/21  1828   WBC 16.3*  --  16.3*  --   --    HGB 7.6*   < > 8.7* 8.8* 9.1*   HCT 25.2*   < > 26.4* 27.1* 28.0*     --  116*  --   --     < > = values in this interval not displayed. Recent Labs     11/23/21 0913 11/23/21 1847 11/24/21 1828    139 143   K 3.8 4.2 3.5    108 115*   CO2 16* 19* 19*   BUN 29* 28* 26*   CREATININE <0.5* <0.5* <0.5*   GLUCOSE 176* 152* 135*     Recent Labs     11/23/21 0913 11/23/21 1847 11/24/21 1828   AST 81* 82* 73*   ALT 37 36 32   BILITOT 4.6* 4.0* 4.3*   ALKPHOS 157* 123 83     No results for input(s): TROPONINI in the last 72 hours. No results for input(s): BNP in the last 72 hours.   Lab Results   Component Value Date    PHART 7.536 11/24/2021 HMO2FTF 25.8 11/24/2021    PO2ART 65.7 11/24/2021     No results for input(s): INR in the last 72 hours. Recent Labs     11/23/21  0938   COLORU Yellow   PHUR 7.5   CLARITYU Clear   SPECGRAV 1.015   LEUKOCYTESUR Negative   UROBILINOGEN 1.0   BILIRUBINUR Negative   BLOODU Negative   GLUCOSEU Negative        DATA:  CXR  No radiographic evidence of acute pulmonary disease. Abdominal X ray  Enteric tube side port projects over the expected region of the GE junction,   recommend advancement     EKG  Sinus tachycardia  Nonspecific ST abnormality    Assessment &Plan:    Patient Active Problem List:     Kyphosis of thoracic region     DDD (degenerative disc disease), thoracic     Scapular dyskinesis     Hepatic encephalopathy (HCC)     Hyponatremia     Acute metabolic encephalopathy     Hypokalemia     QT prolongation     Lactic acidosis     Elevated LFTs     Moderate malnutrition (HCC)     Disc displacement, thoracic     Chronic thoracic back pain     Chronic thoracic spine pain    Hepatic encephalopathy - severe. Patient will not be able to protect her airway. During intubation she was found to have excessive secretions in the samantha and laryngopharynx and same secretions were in the airway that was aspirated with a bronchoscope. Respiratory alkalosis due to decompensated liver cirrhosis   UGIB:  EGD with coffee ground coated gastric mucosa but no active bleed. She had Dieulafoy lesion but was not bleeding and was clipped. Lactic acidemia probably due to advanced liver disease. There is no hypotension. Lactic acid is trending down. Leukocytosis with left shift. Intubated and laced on vent support. , RR 16, PEEP 5  Get ABG  Post intubation CXR reviewed. She is on lactulose but did not have BM yet. Will give lactulose enema.   Will also start Rifaximin   Continue PPI, Octreotide and ceftrixone per GI recommendations  Propofol for sedation if needed     Pt has a high probability of imminent or life-threatening deterioration requiring close monitoring, and highly complex decision-making and/or interventions of high intensity to assess, manipulate, and support his critical organ systems to prevent a likely inevitable decline which could occur if left untreated.      A total critical care time 55 minutes was used. This includes but not limited to examining patient, collaborating with other physicians, monitoring vital signs, telemetry, continuous pulse oximetry, and clinical response to IV medications, documentation time, review and interpretation of laboratory and radiological data, review of nursing notes and old record review. This time excludes any time that may have been spent performing procedures for life threatening organ failure. Thank you Sekou Scott for the opportunity to be involved in this patients care.  If you have any questions or concerns please feel free to contact me  Full Code

## 2021-11-25 NOTE — PROGRESS NOTES
11/25/21 0727   Vent Information   Vent Type 980   Vent Mode AC/VC+   Vt Ordered 450 mL   Rate Set 16 bmp   Pressure Support 0 cmH20   FiO2  30 %   SpO2 100 %   SpO2/FiO2 ratio 333.33   Sensitivity 3   PEEP/CPAP 5   I Time/ I Time % 1 s   Humidification Source HME   Vent Patient Data   Peak Inspiratory Pressure 17 cmH2O   Mean Airway Pressure 8.19 cmH20   Rate Measured 16 br/min   Vt Exhaled 463 mL   Minute Volume 7.43 Liters   I:E Ratio 1:2.80   Cough/Sputum   Sputum How Obtained Endotracheal; Suctioned   Cough Weak; Non-productive   Sputum Amount None   Spontaneous Breathing Trial (SBT) RT Doc   Contraindications to SBT? Failed SAT   Pulse 73   Breath Sounds   Right Upper Lobe Rhonchi   Right Middle Lobe Diminished   Right Lower Lobe Diminished   Left Upper Lobe Rhonchi   Left Lower Lobe Diminished   Additional Respiratory  Assessments   Resp 16   Position Semi-Smallwood's   Alarm Settings   High Pressure Alarm 40 cmH2O   Low Minute Volume Alarm 3 L/min   Apnea (secs) 20 secs   High Respiratory Rate 40 br/min   Low Exhaled Vt  200 mL   Patient Observation   Observations ambu @ bedside   ETT (adult)   Placement Date/Time: 11/24/21 1845   Preoxygenation: Yes  Mask Ventilation: Ventilated by mask (1); Ventilated by mask with oral airway (2)  Technique: Direct laryngoscopy  Type: Cuffed  Tube Size: 8 mm  Laryngoscope: GlideScope  Blade Size: 3  Locati. ..    Secured at 25 cm   Measured From [address] [place] [address],[address] By Commercial tube frankel   Site Condition Dry   Cuff Pressure 28 cm H2O

## 2021-11-26 LAB
A/G RATIO: 0.8 (ref 1.1–2.2)
A/G RATIO: 1 (ref 1.1–2.2)
ALBUMIN SERPL-MCNC: 2.1 G/DL (ref 3.4–5)
ALBUMIN SERPL-MCNC: 2.2 G/DL (ref 3.4–5)
ALP BLD-CCNC: 67 U/L (ref 40–129)
ALP BLD-CCNC: 71 U/L (ref 40–129)
ALT SERPL-CCNC: 27 U/L (ref 10–40)
ALT SERPL-CCNC: 27 U/L (ref 10–40)
AMMONIA: 52 UMOL/L (ref 11–51)
ANION GAP SERPL CALCULATED.3IONS-SCNC: 3 MMOL/L (ref 3–16)
ANION GAP SERPL CALCULATED.3IONS-SCNC: 4 MMOL/L (ref 3–16)
AST SERPL-CCNC: 44 U/L (ref 15–37)
AST SERPL-CCNC: 48 U/L (ref 15–37)
BASE EXCESS ARTERIAL: -2.2 MMOL/L (ref -3–3)
BASOPHILS ABSOLUTE: 0 K/UL (ref 0–0.2)
BASOPHILS RELATIVE PERCENT: 0.5 %
BILIRUB SERPL-MCNC: 3.4 MG/DL (ref 0–1)
BILIRUB SERPL-MCNC: 3.6 MG/DL (ref 0–1)
BLOOD BANK DISPENSE STATUS: NORMAL
BLOOD BANK PRODUCT CODE: NORMAL
BPU ID: NORMAL
BUN BLDV-MCNC: 34 MG/DL (ref 7–20)
BUN BLDV-MCNC: 35 MG/DL (ref 7–20)
CALCIUM SERPL-MCNC: 7.7 MG/DL (ref 8.3–10.6)
CALCIUM SERPL-MCNC: 7.8 MG/DL (ref 8.3–10.6)
CARBOXYHEMOGLOBIN ARTERIAL: 0.4 % (ref 0–1.5)
CHLORIDE BLD-SCNC: 120 MMOL/L (ref 99–110)
CHLORIDE BLD-SCNC: 124 MMOL/L (ref 99–110)
CO2: 20 MMOL/L (ref 21–32)
CO2: 21 MMOL/L (ref 21–32)
CREAT SERPL-MCNC: <0.5 MG/DL (ref 0.6–1.2)
CREAT SERPL-MCNC: <0.5 MG/DL (ref 0.6–1.2)
DESCRIPTION BLOOD BANK: NORMAL
EOSINOPHILS ABSOLUTE: 0.2 K/UL (ref 0–0.6)
EOSINOPHILS RELATIVE PERCENT: 2.6 %
GFR AFRICAN AMERICAN: >60
GFR AFRICAN AMERICAN: >60
GFR NON-AFRICAN AMERICAN: >60
GFR NON-AFRICAN AMERICAN: >60
GLUCOSE BLD-MCNC: 104 MG/DL (ref 70–99)
GLUCOSE BLD-MCNC: 107 MG/DL (ref 70–99)
GLUCOSE BLD-MCNC: 89 MG/DL (ref 70–99)
HCO3 ARTERIAL: 21.9 MMOL/L (ref 21–29)
HCT VFR BLD CALC: 23.1 % (ref 36–48)
HCT VFR BLD CALC: 27.2 % (ref 36–48)
HCT VFR BLD CALC: 29.9 % (ref 36–48)
HEMOGLOBIN, ART, EXTENDED: 8.3 G/DL (ref 12–16)
HEMOGLOBIN: 10.1 G/DL (ref 12–16)
HEMOGLOBIN: 7.6 G/DL (ref 12–16)
HEMOGLOBIN: 9.1 G/DL (ref 12–16)
LYMPHOCYTES ABSOLUTE: 1.3 K/UL (ref 1–5.1)
LYMPHOCYTES RELATIVE PERCENT: 16.6 %
MAGNESIUM: 2 MG/DL (ref 1.8–2.4)
MCH RBC QN AUTO: 27.5 PG (ref 26–34)
MCHC RBC AUTO-ENTMCNC: 33 G/DL (ref 31–36)
MCV RBC AUTO: 83.3 FL (ref 80–100)
METHEMOGLOBIN ARTERIAL: 0.6 %
MONOCYTES ABSOLUTE: 0.6 K/UL (ref 0–1.3)
MONOCYTES RELATIVE PERCENT: 8.1 %
NEUTROPHILS ABSOLUTE: 5.6 K/UL (ref 1.7–7.7)
NEUTROPHILS RELATIVE PERCENT: 72.2 %
O2 SAT, ARTERIAL: 98.1 %
O2 THERAPY: ABNORMAL
PCO2 ARTERIAL: 34.5 MMHG (ref 35–45)
PDW BLD-RTO: 18.5 % (ref 12.4–15.4)
PERFORMED ON: ABNORMAL
PH ARTERIAL: 7.42 (ref 7.35–7.45)
PLATELET # BLD: 87 K/UL (ref 135–450)
PMV BLD AUTO: 8.4 FL (ref 5–10.5)
PO2 ARTERIAL: 128.1 MMHG (ref 75–108)
POTASSIUM REFLEX MAGNESIUM: 2.9 MMOL/L (ref 3.5–5.1)
POTASSIUM REFLEX MAGNESIUM: 3.7 MMOL/L (ref 3.5–5.1)
POTASSIUM SERPL-SCNC: 3.6 MMOL/L (ref 3.5–5.1)
RBC # BLD: 2.78 M/UL (ref 4–5.2)
REASON FOR REJECTION: NORMAL
REJECTED TEST: NORMAL
SODIUM BLD-SCNC: 145 MMOL/L (ref 136–145)
SODIUM BLD-SCNC: 147 MMOL/L (ref 136–145)
TCO2 ARTERIAL: 23 MMOL/L
TOTAL PROTEIN: 4.5 G/DL (ref 6.4–8.2)
TOTAL PROTEIN: 4.7 G/DL (ref 6.4–8.2)
WBC # BLD: 7.8 K/UL (ref 4–11)

## 2021-11-26 PROCEDURE — 2580000003 HC RX 258: Performed by: INTERNAL MEDICINE

## 2021-11-26 PROCEDURE — 6370000000 HC RX 637 (ALT 250 FOR IP): Performed by: INTERNAL MEDICINE

## 2021-11-26 PROCEDURE — 94003 VENT MGMT INPAT SUBQ DAY: CPT

## 2021-11-26 PROCEDURE — 36415 COLL VENOUS BLD VENIPUNCTURE: CPT

## 2021-11-26 PROCEDURE — 2000000000 HC ICU R&B

## 2021-11-26 PROCEDURE — 85025 COMPLETE CBC W/AUTO DIFF WBC: CPT

## 2021-11-26 PROCEDURE — 85014 HEMATOCRIT: CPT

## 2021-11-26 PROCEDURE — 2700000000 HC OXYGEN THERAPY PER DAY

## 2021-11-26 PROCEDURE — 84132 ASSAY OF SERUM POTASSIUM: CPT

## 2021-11-26 PROCEDURE — 94761 N-INVAS EAR/PLS OXIMETRY MLT: CPT

## 2021-11-26 PROCEDURE — 82140 ASSAY OF AMMONIA: CPT

## 2021-11-26 PROCEDURE — 99291 CRITICAL CARE FIRST HOUR: CPT | Performed by: INTERNAL MEDICINE

## 2021-11-26 PROCEDURE — 83735 ASSAY OF MAGNESIUM: CPT

## 2021-11-26 PROCEDURE — 80053 COMPREHEN METABOLIC PANEL: CPT

## 2021-11-26 PROCEDURE — 2500000003 HC RX 250 WO HCPCS: Performed by: INTERNAL MEDICINE

## 2021-11-26 PROCEDURE — 85018 HEMOGLOBIN: CPT

## 2021-11-26 PROCEDURE — 82803 BLOOD GASES ANY COMBINATION: CPT

## 2021-11-26 PROCEDURE — 6360000002 HC RX W HCPCS: Performed by: INTERNAL MEDICINE

## 2021-11-26 PROCEDURE — C9113 INJ PANTOPRAZOLE SODIUM, VIA: HCPCS | Performed by: INTERNAL MEDICINE

## 2021-11-26 RX ORDER — SODIUM CHLORIDE 9 MG/ML
INJECTION, SOLUTION INTRAVENOUS PRN
Status: DISCONTINUED | OUTPATIENT
Start: 2021-11-26 | End: 2021-12-04 | Stop reason: HOSPADM

## 2021-11-26 RX ORDER — CHLORHEXIDINE GLUCONATE 0.12 MG/ML
15 RINSE ORAL 2 TIMES DAILY
Status: DISCONTINUED | OUTPATIENT
Start: 2021-11-26 | End: 2021-11-29

## 2021-11-26 RX ORDER — CARBOXYMETHYLCELLULOSE SODIUM 10 MG/ML
1 GEL OPHTHALMIC
Status: DISCONTINUED | OUTPATIENT
Start: 2021-11-26 | End: 2021-11-29

## 2021-11-26 RX ORDER — DEXMEDETOMIDINE HYDROCHLORIDE 4 UG/ML
.2-1.4 INJECTION, SOLUTION INTRAVENOUS CONTINUOUS
Status: DISCONTINUED | OUTPATIENT
Start: 2021-11-26 | End: 2021-11-29

## 2021-11-26 RX ORDER — PANTOPRAZOLE SODIUM 40 MG/10ML
40 INJECTION, POWDER, LYOPHILIZED, FOR SOLUTION INTRAVENOUS 2 TIMES DAILY
Status: DISCONTINUED | OUTPATIENT
Start: 2021-11-26 | End: 2021-12-04 | Stop reason: HOSPADM

## 2021-11-26 RX ADMIN — PROPOFOL 15 MCG/KG/MIN: 10 INJECTION, EMULSION INTRAVENOUS at 01:15

## 2021-11-26 RX ADMIN — RIFAXIMIN 550 MG: 550 TABLET ORAL at 20:43

## 2021-11-26 RX ADMIN — CARBOXYMETHYLCELLULOSE SODIUM 1 DROP: 10 GEL OPHTHALMIC at 20:43

## 2021-11-26 RX ADMIN — Medication 0.2 MCG/KG/HR: at 09:57

## 2021-11-26 RX ADMIN — Medication 20 G: at 06:06

## 2021-11-26 RX ADMIN — Medication 20 G: at 01:01

## 2021-11-26 RX ADMIN — MUPIROCIN: 20 OINTMENT TOPICAL at 09:00

## 2021-11-26 RX ADMIN — SODIUM CHLORIDE, PRESERVATIVE FREE 10 ML: 5 INJECTION INTRAVENOUS at 20:43

## 2021-11-26 RX ADMIN — MUPIROCIN: 20 OINTMENT TOPICAL at 08:02

## 2021-11-26 RX ADMIN — DEXTROSE MONOHYDRATE 2 MCG/MIN: 50 INJECTION, SOLUTION INTRAVENOUS at 09:54

## 2021-11-26 RX ADMIN — Medication 20 G: at 17:33

## 2021-11-26 RX ADMIN — Medication 20 G: at 13:39

## 2021-11-26 RX ADMIN — CARBOXYMETHYLCELLULOSE SODIUM 1 DROP: 10 GEL OPHTHALMIC at 16:34

## 2021-11-26 RX ADMIN — CEFTRIAXONE SODIUM 1000 MG: 1 INJECTION, POWDER, FOR SOLUTION INTRAMUSCULAR; INTRAVENOUS at 17:34

## 2021-11-26 RX ADMIN — PROPOFOL 25 MCG/KG/MIN: 10 INJECTION, EMULSION INTRAVENOUS at 11:28

## 2021-11-26 RX ADMIN — POTASSIUM CHLORIDE 20 MEQ: 400 INJECTION, SOLUTION INTRAVENOUS at 09:38

## 2021-11-26 RX ADMIN — Medication 15 ML: at 09:58

## 2021-11-26 RX ADMIN — FENTANYL CITRATE 25 MCG/HR: 50 INJECTION, SOLUTION INTRAMUSCULAR; INTRAVENOUS at 02:01

## 2021-11-26 RX ADMIN — Medication 15 ML: at 20:44

## 2021-11-26 RX ADMIN — MUPIROCIN: 20 OINTMENT TOPICAL at 20:44

## 2021-11-26 RX ADMIN — POTASSIUM CHLORIDE 20 MEQ: 400 INJECTION, SOLUTION INTRAVENOUS at 06:56

## 2021-11-26 RX ADMIN — SODIUM CHLORIDE, PRESERVATIVE FREE 10 ML: 5 INJECTION INTRAVENOUS at 08:02

## 2021-11-26 RX ADMIN — PROPOFOL 10 MCG/KG/MIN: 10 INJECTION, EMULSION INTRAVENOUS at 00:15

## 2021-11-26 RX ADMIN — FENTANYL CITRATE 75 MCG/HR: 50 INJECTION, SOLUTION INTRAMUSCULAR; INTRAVENOUS at 17:35

## 2021-11-26 RX ADMIN — CARBOXYMETHYLCELLULOSE SODIUM 1 DROP: 10 GEL OPHTHALMIC at 13:39

## 2021-11-26 RX ADMIN — PANTOPRAZOLE SODIUM 40 MG: 40 INJECTION, POWDER, FOR SOLUTION INTRAVENOUS at 09:58

## 2021-11-26 RX ADMIN — PANTOPRAZOLE SODIUM 40 MG: 40 INJECTION, POWDER, FOR SOLUTION INTRAVENOUS at 20:43

## 2021-11-26 RX ADMIN — RIFAXIMIN 550 MG: 550 TABLET ORAL at 08:02

## 2021-11-26 RX ADMIN — FENTANYL CITRATE 25 MCG/HR: 50 INJECTION, SOLUTION INTRAMUSCULAR; INTRAVENOUS at 01:01

## 2021-11-26 RX ADMIN — POTASSIUM CHLORIDE 20 MEQ: 400 INJECTION, SOLUTION INTRAVENOUS at 08:00

## 2021-11-26 ASSESSMENT — PULMONARY FUNCTION TESTS
PIF_VALUE: 17
PIF_VALUE: 17
PIF_VALUE: 16
PIF_VALUE: 18
PIF_VALUE: 16
PIF_VALUE: 19
PIF_VALUE: 16
PIF_VALUE: 18
PIF_VALUE: 17
PIF_VALUE: 17
PIF_VALUE: 16
PIF_VALUE: 17
PIF_VALUE: 17
PIF_VALUE: 16
PIF_VALUE: 17
PIF_VALUE: 17
PIF_VALUE: 16
PIF_VALUE: 19

## 2021-11-26 NOTE — PROGRESS NOTES
11/26/21 1544   Vent Information   Vent Type 980   Vent Mode AC/VC+   Vt Ordered 450 mL   Rate Set 12 bmp   Pressure Support 0 cmH20   FiO2  30 %   SpO2 98 %   SpO2/FiO2 ratio 326.67   Sensitivity 3   PEEP/CPAP 5   I Time/ I Time % 1.5 s   Vent Patient Data   Peak Inspiratory Pressure 16 cmH2O   Mean Airway Pressure 8.4 cmH20   Rate Measured 12 br/min   Vt Exhaled 459 mL   Minute Volume 5.5 Liters   I:E Ratio 1:2.30   Spontaneous Breathing Trial (SBT) RT Doc   Pulse 77   Breath Sounds   Right Upper Lobe Clear   Right Middle Lobe Diminished   Right Lower Lobe Diminished   Left Upper Lobe Clear   Left Lower Lobe Diminished   Additional Respiratory  Assessments   Position Semi-Smallwood's   Alarm Settings   High Pressure Alarm 40 cmH2O   Low Minute Volume Alarm 3 L/min   High Respiratory Rate 40 br/min   Low Exhaled Vt  200 mL   Patient Observation   Observations 8 ETT, ambu bag @ Bedside   ETT (adult)   Placement Date/Time: 11/24/21 1845   Preoxygenation: Yes  Mask Ventilation: Ventilated by mask (1); Ventilated by mask with oral airway (2)  Technique: Direct laryngoscopy  Type: Cuffed  Tube Size: 8 mm  Laryngoscope: GlideScope  Blade Size: 3  Locati. ..    Secured at 25 cm   Measured From Lips   ET Placement Right   Secured By Commercial tube frankel

## 2021-11-26 NOTE — CARE COORDINATION
Advance Care Planning   Advance Care Planning Clinical Specialist  Conversation Note      Date of ACP Conversation: 11/26/2021    [employer] with:   Patient sedated on vent. Spoke with spouse at bedside. Healthcare Decision Maker is spouse   Wandy Pathak as below. Next of Kin by law (only applies in absence of above) Patient has no AD's    ACP Clinical Specialist: Mamie Pascal, RN      *When Decision Maker makes decisions on behalf of the incapacitated patient: Decision Maker is asked to consider and make decisions based on patient values, known preferences, or best interests. Current Designated Health Care Decision Maker:   Primary Decision Maker: John Gold field as needed.)    If no Decision Maker listed above or available through scanned documents, then:    [address] [place] [address]   Who do you trust to make healthcare decisions for you? Name:   Wandy Pathak  Phone  Number: [phone]  Can this person be reached and be able to respond quickly, such as within a few minutes or hours? Yes     For below questions, when conducting conversation with Everardo, substitute \"she\" and \"her\" for \"you\" and \"your\". Hospitalization  If your health were to worsen and it became clear that your chance of recovery was unlikely, what would your preferences be regarding hospitalization?:    Choice:  [x]  The patient would want hospitalization  []  The patient would prefer comfort-focused treatment without hospitalization. Ventilation  If you were in your present state of health and suddenly became very ill and were unable to breathe on your own, what would your preference be about the use of a ventilator (breathing machine) if it were available to you?       If patient would desire the use of a ventilator (breathing machine), answer \"yes\", if not \"no\":yes    If your health were to worsen and it became clear that your chance of recovery was unlikely, would that change your answer? Yes    Resuscitation  CPR works best to restart the heart when there is a sudden event, like a heart attack, in someone who is otherwise healthy. Unfortunately, CPR does not typically restart the heart for people who have serious health conditions or who are very sick. In the event your heart stopped, would you want attempts to restart your heart (answer \"yes\") or would you prefer a natural death (answer \"no\")? yes    If your health were to worsen and it became clear that your chance of recovery was unlikely, would that change your answer? Yes    [x] Yes  [] No   Educated Patient / Ward Voss regarding differences between Advance Directives and portable DNR orders.     Length of ACP Conversation in minutes:  30 minutes    Conversation Outcomes:  [x] ACP discussion completed  [] Existing advance directive reviewed with patient; no changes to patient's previously recorded wishes   [] New Advance Directive completed   [] Portable Do Not Rescitate prepared for Provider review and signature  [] POLST/POST/MOLST/MOST prepared for Provider review and signature      Follow-up plan:    [] Schedule follow-up conversation to continue planning  [] Referred individual to Provider for additional questions/concerns   [] Advised patient/agent/surrogate to review completed ACP document and update if needed with changes in condition, patient preferences or care setting     [] This note routed to one or more involved healthcare providers     Ximena Villegas RN  ACP Clinical Specialist

## 2021-11-26 NOTE — PROGRESS NOTES
11/26/21 0348   Vent Information   Equipment Changed HME   Vent Type 980   Vent Mode AC/VC+   Vt Ordered 450 mL   Rate Set 12 bmp   Pressure Support 0 cmH20   FiO2  30 %   SpO2 100 %   SpO2/FiO2 ratio 333.33   Sensitivity 3   PEEP/CPAP 5   I Time/ I Time % 1.5 s   Humidification Source HME   Vent Patient Data   Peak Inspiratory Pressure 16 cmH2O   Mean Airway Pressure 8.3 cmH20   Rate Measured 12 br/min   Vt Exhaled 447 mL   Minute Volume 5.49 Liters   I:E Ratio 1:2.30   Cough/Sputum   Sputum How Obtained Endotracheal   Cough Productive   Sputum Amount Small   Sputum Color Dark red   Tenacity Thin   Spontaneous Breathing Trial (SBT) RT Doc   Pulse 76   Breath Sounds   Right Upper Lobe Clear   Right Middle Lobe Clear   Right Lower Lobe Diminished   Left Upper Lobe Clear   Left Lower Lobe Diminished   Alarm Settings   High Pressure Alarm 40 cmH2O   Low Minute Volume Alarm 3 L/min   High Respiratory Rate 40 br/min   Low Exhaled Vt  200 mL   ETT (adult)   Placement Date/Time: 11/24/21 1845   Preoxygenation: Yes  Mask Ventilation: Ventilated by mask (1); Ventilated by mask with oral airway (2)  Technique: Direct laryngoscopy  Type: Cuffed  Tube Size: 8 mm  Laryngoscope: GlideScope  Blade Size: 3  Locati. ..    Secured at 25 cm   Measured From [address] [place] [address],[address] By Commercial tube frankel   Site Condition Dry

## 2021-11-26 NOTE — CARE COORDINATION
Case Management/Follow up:    Chart reviewed for length of stay. Hospital day #  3  Unit:  C2  Diagnosis and current status as per MD progress: AMS - currently on Vent  Per MD note:  Agitated last night, required to restart sedation   Off levo yesterday, however it was resumed this morning  BP is soft. UOP is low   HGB dropped to 7.6    Anticipated d/c date: TBD  Expected plan for discharge: IPTA - hopes to return home but may need IPR/SNF  Potential barriers: none  Precert required/date initiated:  NA -  Medicare  Confirmed plan with patient and/or family:  Comments: Spoke with spouse and nephew at bedside. They are aware CM is available to assist with whatever LOC is needed.        Mireya De La Paz RN [facility] Hearing screening results were discussed with parent. Infant did not pass hearing screening and will need further evaluation outpatient. Questions answered. Brochure given to parent. Advised to monitor developmental milestones and contact physician for any concerns. Discussed the importance of follow up and reviewed date and time ( if scheduling with [address] [place] [address] Audiology) and / or provided a list of other locations should they wish to have baby tested elsewhere. Reviewed with patient we do not always have reminder calls and to please add this to their calendars. Reviewed to bring baby as calm as possible and let baby stay sleeping if asleep upon arrival to appointment. Parent acknowledged all and understood directions. No further questions at this time but provided office # if they think of anything.      Ward Giron, AuD

## 2021-11-26 NOTE — PROGRESS NOTES
11/25/21 2338   Vent Information   Vent Type 980   Vent Mode AC/VC+   Vt Ordered 450 mL   Rate Set 12 bmp   Pressure Support 0 cmH20   FiO2  30 %   SpO2 100 %   SpO2/FiO2 ratio 333.33   Sensitivity 3   PEEP/CPAP 5   I Time/ I Time % 1.5 s   Humidification Source HME   Vent Patient Data   Peak Inspiratory Pressure 17 cmH2O   Mean Airway Pressure 9.6 cmH20   Rate Measured 14 br/min   Vt Exhaled 450 mL   Minute Volume 6.56 Liters   I:E Ratio 1:2.30   Cough/Sputum   Sputum How Obtained Endotracheal   Cough Productive   Sputum Amount Small   Sputum Color Red   Tenacity Thick   Spontaneous Breathing Trial (SBT) RT Doc   Pulse 82   Breath Sounds   Right Upper Lobe Clear   Right Middle Lobe Diminished   Right Lower Lobe Diminished   Left Upper Lobe Clear   Left Lower Lobe Diminished   Alarm Settings   High Pressure Alarm 40 cmH2O   Low Minute Volume Alarm 3 L/min   High Respiratory Rate 40 br/min   Low Exhaled Vt  200 mL   ETT (adult)   Placement Date/Time: 11/24/21 1845   Preoxygenation: Yes  Mask Ventilation: Ventilated by mask (1); Ventilated by mask with oral airway (2)  Technique: Direct laryngoscopy  Type: Cuffed  Tube Size: 8 mm  Laryngoscope: GlideScope  Blade Size: 3  Locati. ..    Secured at 25 cm   Measured From Lips   ET Placement Right   Secured By Commercial tube frankel   Site Condition Dry   Cuff Pressure 28 cm H2O

## 2021-11-26 NOTE — PROGRESS NOTES
11/26/21 1158   Vent Information   Vent Type 980   Vent Mode AC/VC+   Vt Ordered 450 mL   Rate Set 12 bmp   Pressure Support 0 cmH20   FiO2  30 %   SpO2 100 %   SpO2/FiO2 ratio 333.33   Sensitivity 3   PEEP/CPAP 5   I Time/ I Time % 1.5 s   Vent Patient Data   Peak Inspiratory Pressure 17 cmH2O   Mean Airway Pressure 8.6 cmH20   Rate Measured 12 br/min   Vt Exhaled 458 mL   Minute Volume 5.5 Liters   I:E Ratio 1:2.30   Cough/Sputum   Sputum How Obtained Endotracheal; Suctioned   Cough Productive   Sputum Amount Small   Sputum Color Creamy   Tenacity Thick   Spontaneous Breathing Trial (SBT) RT Doc   Pulse 56   Breath Sounds   Right Upper Lobe Clear   Right Middle Lobe Clear   Right Lower Lobe Diminished   Left Upper Lobe Clear   Left Lower Lobe Diminished   Additional Respiratory  Assessments   Position Semi-Smallwood's   Alarm Settings   High Pressure Alarm 40 cmH2O   Low Minute Volume Alarm 3 L/min   High Respiratory Rate 40 br/min   Low Exhaled Vt  200 mL   Patient Observation   Observations 8 ETT, ambu bag @ bedsode   ETT (adult)   Placement Date/Time: 11/24/21 2935   Preoxygenation: Yes  Mask Ventilation: Ventilated by mask (1); Ventilated by mask with oral airway (2)  Technique: Direct laryngoscopy  Type: Cuffed  Tube Size: 8 mm  Laryngoscope: GlideScope  Blade Size: 3  Locati. ..    Secured at 25 cm   Measured From Lips   ET Placement Left   Secured By Commercial tube frankel

## 2021-11-26 NOTE — PROGRESS NOTES
Pulmonary & Critical Care Medicine ICU Progress Note    Admit Date: 2021  PCP: Brown Nixon    CC:  Respiratory failure   Events of Last 24 hours:    Agitated last night, required to restart sedation   Off levo yesterday, however it was resumed this morning  BP is soft. UOP is low   HGB dropped to 7.6    Vitals:  Tmax:  VITALS:  BP (!) 93/47   Pulse 66   Temp 96.7 °F (35.9 °C) (Bladder)   Resp 14   Ht 5' 5\" (1.651 m)   Wt 123 lb 7.3 oz (56 kg)   SpO2 100%   BMI 20.54 kg/m²   24HR INTAKE/OUTPUT:      Intake/Output Summary (Last 24 hours) at 2021 0837  Last data filed at 2021 0600  Gross per 24 hour   Intake 1342.88 ml   Output 420 ml   Net 922.88 ml     CURRENT PULSE OXIMETRY:  SpO2: 100 %  24HR PULSE OXIMETRY RANGE:  SpO2  Av.7 %  Min: 97 %  Max: 100 %      Vent Settings:  Vent Mode: AC/VC+ Rate Set: 12 bmp/Vt Ordered: 450 mL/ /FiO2 : 30 %  PEEP5  Recent Labs     211 21  0828   PHART 7.532* 7.464*   LCS5RNK 27.1* 29.5*   PO2ART 173.6* 119.0*         EXAM:  General: No distress. Sedated. Eyes: PERRL. No sclera icterus. No conjunctival injection. ENT: ETT in place  Neck: Trachea midline. Normal thyroid. Resp: No accessory muscle use. CV: Regular rate. Regular rhythm. No mumur or rub. +1 edema   GI: Non-tender. Non-distended. No masses. No organmegaly. Normal bowel sounds. Skin: Warm and dry. No nodule on exposed extremities. No rash on exposed extremities. Neuro:Intubated, sedated   Psych:  Intubated, sedated      IV:   norepinephrine Stopped (21 1524)    sodium chloride      propofol 40 mcg/kg/min (21 0805)    fentaNYL (SUBLIMAZE) infusion 50 mcg/hr (21 8246)    pantoprazole 8 mg/hr (21 8087)    sodium chloride      sodium chloride 25 mL (21 7349)         Scheduled Meds:     mupirocin   Nasal BID    lactulose  20 g Oral 4 times per day    rifaximin  550 mg Oral BID    pantoprazole  40 mg Oral QAM AC    sodium chloride - improved   Leukocytosis with left shift - better   Hypotension requiring levophed on and off. Transfuse another unit of PRBC  Monitor H&H  Lactulose/Rifaximin   Continue PPI and ceftriaxone  Start precedex and try to wean propofol. SBT planned tomorrow     Pt has a high probability of imminent or life-threatening deterioration requiring close monitoring, and highly complex decision-making and/or interventions of high intensity to assess, manipulate, and support his critical organ systems to prevent a likely inevitable decline which could occur if left untreated.      A total critical care time 35 minutes was used. This includes but not limited to examining patient, collaborating with other physicians, monitoring vital signs, telemetry, continuous pulse oximetry, and clinical response to IV medications, documentation time, review and interpretation of laboratory and radiological data, review of nursing notes and old record review. This time excludes any time that may have been spent performing procedures for life threatening organ failure.     Lupis Sherman MD, MD

## 2021-11-26 NOTE — PROGRESS NOTES
11/26/21 0743   Vent Information   Vent Type 980   Vent Mode AC/VC+   Vt Ordered 450 mL   Rate Set 12 bmp   Pressure Support 0 cmH20   FiO2  30 %   SpO2 100 %   SpO2/FiO2 ratio 333.33   Sensitivity 3   PEEP/CPAP 5   I Time/ I Time % 1.5 s   Vent Patient Data   Peak Inspiratory Pressure 17 cmH2O   Mean Airway Pressure 8.6 cmH20   Rate Measured 12 br/min   Vt Exhaled 457 mL   Minute Volume 5.47 Liters   I:E Ratio 1:2.30   Cough/Sputum   Sputum How Obtained Endotracheal; Suctioned   Cough Productive   Sputum Amount Small   Sputum Color Creamy   Spontaneous Breathing Trial (SBT) RT Doc   Pulse 66   Breath Sounds   Right Upper Lobe Clear   Right Middle Lobe Clear   Right Lower Lobe Diminished   Left Upper Lobe Clear   Left Lower Lobe Diminished   Additional Respiratory  Assessments   Position Semi-Smallwood's   Cuff Pressure (cm H2O) 30 cm H2O   Alarm Settings   High Pressure Alarm 40 cmH2O   Low Minute Volume Alarm 3 L/min   High Respiratory Rate 40 br/min   Low Exhaled Vt  200 mL   Patient Observation   Observations 8 ETT, ambu bag @ bedside   ETT (adult)   Placement Date/Time: 11/24/21 0825   Preoxygenation: Yes  Mask Ventilation: Ventilated by mask (1); Ventilated by mask with oral airway (2)  Technique: Direct laryngoscopy  Type: Cuffed  Tube Size: 8 mm  Laryngoscope: GlideScope  Blade Size: 3  Locati. ..    Secured at 25 cm   Measured From [address] [place] [address],[address] By Commercial tube frankel

## 2021-11-26 NOTE — PROGRESS NOTES
Rales/Wheezes/Rhonchi. Cardiovascular:  Regular rate and rhythm with normal S1/S2 without murmurs, rubs or gallops. Abdomen: Soft, non-tender, non-distended with normal bowel sounds. Rectal tube in place  Musculoskeletal:  No clubbing, cyanosis or edema bilaterally.  Full range of motion without deformity. Skin: Skin color, texture, turgor normal.  No rashes or lesions. Neurologic:  Neurovascularly intact without any focal sensory/motor deficits. Cranial nerves: II-XII intact, grossly non-focal.  Psychiatric:  Alert and oriented x0, sedated  Capillary Refill: Brisk,3 seconds, normal  Peripheral Pulses: +2 palpable, equal bilaterally        Labs:   Recent Labs     11/24/21  1031 11/24/21  1211 11/25/21  0700 11/25/21  0752 11/25/21  1520 11/25/21  2300 11/26/21  0600   WBC 16.3*  --  7.3  --   --   --  7.8   HGB 8.7*   < > 6.5*   < > 9.2* 9.0* 7.6*   HCT 26.4*   < > 19.6*   < > 27.8* 27.1* 23.1*   *  --  90*  --   --   --  87*    < > = values in this interval not displayed. Recent Labs     11/23/21  0913 11/25/21  0332 11/25/21  1520 11/25/21  1730 11/26/21  0600   NA  --  144  --  141 145   K   < > 3.3* 3.7 3.4* 2.9*   CL  --  117*  --  118* 120*   CO2  --  18*  --  20* 21   BUN  --  31*  --  31* 35*   CREATININE  --  <0.5*  --  <0.5* <0.5*   CALCIUM  --  7.6*  --  7.7* 7.8*    < > = values in this interval not displayed. Recent Labs     11/25/21  0332 11/25/21  1730 11/26/21  0600   AST 62* 54* 44*   ALT 31 29 27   BILITOT 4.4* 4.0* 3.6*   ALKPHOS 85 84 67     Recent Labs     11/25/21  1000   INR 2.33*     Recent Labs     11/23/21  0913   CKTOTAL 213*       Urinalysis:      Lab Results   Component Value Date    NITRU Negative 11/23/2021    BLOODU Negative 11/23/2021    SPECGRAV 1.015 11/23/2021    GLUCOSEU Negative 11/23/2021       Radiology:  XR CHEST PORTABLE   Final Result   Line placement without complicating feature. XR CHEST PORTABLE   Final Result   1.  The endotracheal tube tip is 3 cm above the maria g. 2. Clear lungs. XR ABDOMEN (KUB) (SINGLE AP VIEW)   Final Result   Enteric tube side port projects over the expected region of the GE junction,   recommend advancement. XR ABDOMEN (KUB) (SINGLE AP VIEW)   Final Result   Enteric tube tip and side port project over the stomach. CT ABDOMEN PELVIS W IV CONTRAST Additional Contrast? None   Final Result   1. Cirrhosis with evidence of portal hypertension including multiple upper   varices including distal esophageal and gastric varices as well as a   spontaneous splenorenal shunt. Mild third-spacing with edematous changes   throughout the abdomen and subcutaneous soft tissues. 2. Cholelithiasis with no acute features. 3. Distention with mural thickening in the duodenal sweep and proximal small   bowel. There is no evidence of obstruction. Findings may be related to a   duodenitis and enteritis. 4. Mural thickening of the distal esophagus with evidence of reflux, likely a   reflux esophagitis. CT HEAD WO CONTRAST   Final Result   Atrophy and mild small vessel ischemic disease. XR CHEST PORTABLE   Final Result   No radiographic evidence of acute pulmonary disease.                  Assessment/Plan:    Active Hospital Problems    Diagnosis     Hepatic encephalopathy ([facility] [address] [K72.90]               Hepatic encephalopathy- with ammonia of 176 on arrival, CThead was unremarkable for bleed  -GI consulted, s/p EGD 11/24(noted nonbleeding gastric dieulafoy lesion, endoclipped)  -NG placed in ER, lactulose will need to be ordered  -ppi ggt  -was on octreotide ggt  -ICU care     Possible Resp failure- related to above,   -intubated 11/24 pm to secure airway given encephalopathy and concern for aspiration  -apprec pulm/cc mgmt  -CVC placed 11/25     Acute blood loss Anemia- with concern for coffee ground emesis and upper GIB(?variceal)  -trended h/h  -was on ppi ggt  -2u prbc ordered in ER  -tele  -Iv rocephin started 11/23   -2u prbc 11/25     Lactic acidosis- concern related to above anemia, CTa/p without obvious pathology  -trended labs  -ivf bolus given     HTN- per emr,not on meds  -monitor VS     Transaminitis/Cirrhosis/coagulopathy- sees Margarita Varners, apparently was taking rifaximin per emr  -started lactulose via NG on 11/24   -vit K iv ordered 11/25     DVT Prophylaxis: scd  Diet: Diet NPO Exceptions are: Ice Chips  Code Status: Full Code         PT/OT Eval Status: not ordered     Dispo - icu care, guarded prognosis, attempt weaning of vent    Geovany Ordaz MD

## 2021-11-26 NOTE — PROGRESS NOTES
Comprehensive Nutrition Assessment    Type and Reason for Visit:  Initial, Consult (tube feeding ordering and management if OK with GI)    Nutrition Recommendations/Plan:   1. If OK with Gi recommend Vital 1.2 AF (semi elemental) start at 20 ml/hr increase by 10 ml every 4 hours to goal rate of 60 ml/hr providing 1440 calories, 90 grams of protein, 973 ml free water. 2.  Water flush 100 ml every 4 hours. 3.  Will monitor TF intake and tolerance, blood sugar trends, and fluid and electrolyte balances    Nutrition Assessment:  Patient admitted with hepatic encephalopathy. NPO at this time. Currently requiring vent support, sedated with Propofol at 30 mcg/kg/min providing 266 calories. GI following due to GI bleed. Hemoglobin and Hematocrit has dropped in the past 2 days. Tube feeding initiation pending GI interventions needed. RD will provide tube feeding recommendations for when nutrition able to be started. Will continue to monitor. Malnutrition Assessment:  Malnutrition Status: At risk for malnutrition (Comment)      Estimated Daily Nutrient Needs:  Energy (kcal):  7654-2986; Weight Used for Energy Requirements:  Current (56 kg)     Protein (g):  73-84; Weight Used for Protein Requirements:  Current (1.3-1.5; 56 kg)        Fluid (ml/day):   ; Method Used for Fluid Requirements:         Nutrition Related Findings:  Ca 7.8 on 11/26; H and H still low per nurse-concerned patient will need another scope; rectal tube output 200 ml on 11/25/21-K down to 2.9 this am      Wounds:   (scattered bruising and abrasions)       Current Nutrition Therapies:    Diet NPO Exceptions are: Ice Chips  Current Tube Feeding (TF) Orders:  · Goal TF & Flush Orders Provides: Vital 1.2 AF (semi elemental) start at 20 ml/hr increase by 10 ml every 4 hours to goal rate of 60 ml/hr providing 1440 calories, 90 grams of protein, 973 ml free water. Water flush 100 ml every 4 hours.     Anthropometric Measures:  · Height: 5' 5\" (165.1 cm)  · Current Body Weight: 123 lb 7 oz (56 kg)   · Ideal Body Weight: 125 lbs; % Ideal Body Weight     · BMI: 20.5  · BMI Categories: Normal Weight (BMI 18.5-24. 9)       Nutrition Diagnosis:   · Inadequate energy intake related to impaired respiratory function, lack or limited access to food as evidenced by intubation, NPO or clear liquid status due to medical condition    Nutrition Interventions:   Food and/or Nutrient Delivery:  Start Tube Feeding  Nutrition Education/Counseling:  Education not appropriate   Coordination of Nutrition Care:  Continue to monitor while inpatient    Goals:  Nutrition will start in the next 24 hours. Nutrition Monitoring and Evaluation:   Behavioral-Environmental Outcomes:      Food/Nutrient Intake Outcomes:  Enteral Nutrition Intake/Tolerance  Physical Signs/Symptoms Outcomes:  Fluid Status or Edema, Nutrition Focused Physical Findings, Biochemical Data     Discharge Planning:     Too soon to determine     Electronically signed by Kirill Reyes RD, LD on 11/26/21 at 11:36 AM EST    Contact: [phone]

## 2021-11-26 NOTE — PROGRESS NOTES
11/25/21 1910   Vent Information   Equipment Changed HME   Vent Type 980   Vent Mode AC/VC+   Vt Ordered 450 mL   Rate Set 12 bmp   Pressure Support 0 cmH20   FiO2  30 %   SpO2 100 %   SpO2/FiO2 ratio 333.33   Sensitivity 3   PEEP/CPAP 5   I Time/ I Time % 1.5 s   Humidification Source HME   Vent Patient Data   Peak Inspiratory Pressure 13 cmH2O   Mean Airway Pressure 9.19 cmH20   Rate Measured 17 br/min   Vt Exhaled 5 mL   Minute Volume 3.3 Liters   I:E Ratio 3.10:1   Cough/Sputum   Sputum How Obtained Endotracheal; Suctioned   Cough Weak; Productive   Sputum Amount Moderate   Sputum Color Dark red; Tan   Tenacity Thick   Spontaneous Breathing Trial (SBT) RT Doc   Pulse 85   Breath Sounds   Right Upper Lobe Clear   Right Middle Lobe Clear   Right Lower Lobe Diminished   Left Upper Lobe Clear   Left Lower Lobe Diminished   Additional Respiratory  Assessments   Resp 12   Position Semi-Smallwood's   Alarm Settings   High Pressure Alarm 40 cmH2O   Low Minute Volume Alarm 3 L/min   Apnea (secs) 20 secs   High Respiratory Rate 40 br/min   Low Exhaled Vt  200 mL   Patient Observation   Observations ambu @ bedside   ETT (adult)   Placement Date/Time: 11/24/21 1845   Preoxygenation: Yes  Mask Ventilation: Ventilated by mask (1); Ventilated by mask with oral airway (2)  Technique: Direct laryngoscopy  Type: Cuffed  Tube Size: 8 mm  Laryngoscope: GlideScope  Blade Size: 3  Locati. ..    Secured at 25 cm   Measured From [address] [place] [address],[address] By Commercial tube frankel   Site Condition Dry

## 2021-11-26 NOTE — PLAN OF CARE
Nutrition Problem #1: Inadequate energy intake  Intervention: Food and/or Nutrient Delivery: Start Tube Feeding  Nutritional Goals: Nutrition will start in the next 24 hours.

## 2021-11-27 ENCOUNTER — APPOINTMENT (OUTPATIENT)
Dept: GENERAL RADIOLOGY | Age: 71
DRG: 377 | End: 2021-11-27
Payer: MEDICARE

## 2021-11-27 LAB
AMMONIA: 41 UMOL/L (ref 11–51)
BASE EXCESS ARTERIAL: -5.4 MMOL/L (ref -3–3)
BASOPHILS ABSOLUTE: 0.1 K/UL (ref 0–0.2)
BASOPHILS RELATIVE PERCENT: 1 %
BLOOD CULTURE, ROUTINE: NORMAL
CARBOXYHEMOGLOBIN ARTERIAL: 0.3 % (ref 0–1.5)
CULTURE, BLOOD 2: NORMAL
EOSINOPHILS ABSOLUTE: 0.4 K/UL (ref 0–0.6)
EOSINOPHILS RELATIVE PERCENT: 5.3 %
HCO3 ARTERIAL: 20.2 MMOL/L (ref 21–29)
HCT VFR BLD CALC: 29.4 % (ref 36–48)
HCT VFR BLD CALC: 30.5 % (ref 36–48)
HCT VFR BLD CALC: 31.8 % (ref 36–48)
HEMOGLOBIN, ART, EXTENDED: 10.2 G/DL (ref 12–16)
HEMOGLOBIN: 10.1 G/DL (ref 12–16)
HEMOGLOBIN: 10.6 G/DL (ref 12–16)
HEMOGLOBIN: 9.7 G/DL (ref 12–16)
LYMPHOCYTES ABSOLUTE: 1.9 K/UL (ref 1–5.1)
LYMPHOCYTES RELATIVE PERCENT: 24 %
MCH RBC QN AUTO: 27.7 PG (ref 26–34)
MCHC RBC AUTO-ENTMCNC: 33 G/DL (ref 31–36)
MCV RBC AUTO: 84 FL (ref 80–100)
METHEMOGLOBIN ARTERIAL: 0.4 %
MONOCYTES ABSOLUTE: 0.8 K/UL (ref 0–1.3)
MONOCYTES RELATIVE PERCENT: 10.6 %
NEUTROPHILS ABSOLUTE: 4.6 K/UL (ref 1.7–7.7)
NEUTROPHILS RELATIVE PERCENT: 59.1 %
O2 SAT, ARTERIAL: 96.6 %
O2 THERAPY: ABNORMAL
PCO2 ARTERIAL: 40.1 MMHG (ref 35–45)
PDW BLD-RTO: 18.3 % (ref 12.4–15.4)
PH ARTERIAL: 7.32 (ref 7.35–7.45)
PLATELET # BLD: 104 K/UL (ref 135–450)
PMV BLD AUTO: 8.2 FL (ref 5–10.5)
PO2 ARTERIAL: 101.3 MMHG (ref 75–108)
RBC # BLD: 3.5 M/UL (ref 4–5.2)
TCO2 ARTERIAL: 21.5 MMOL/L
WBC # BLD: 7.8 K/UL (ref 4–11)

## 2021-11-27 PROCEDURE — 85025 COMPLETE CBC W/AUTO DIFF WBC: CPT

## 2021-11-27 PROCEDURE — 82140 ASSAY OF AMMONIA: CPT

## 2021-11-27 PROCEDURE — 74018 RADEX ABDOMEN 1 VIEW: CPT

## 2021-11-27 PROCEDURE — 6360000002 HC RX W HCPCS: Performed by: INTERNAL MEDICINE

## 2021-11-27 PROCEDURE — 2580000003 HC RX 258: Performed by: INTERNAL MEDICINE

## 2021-11-27 PROCEDURE — 85018 HEMOGLOBIN: CPT

## 2021-11-27 PROCEDURE — 2000000000 HC ICU R&B

## 2021-11-27 PROCEDURE — 2500000003 HC RX 250 WO HCPCS: Performed by: INTERNAL MEDICINE

## 2021-11-27 PROCEDURE — 6370000000 HC RX 637 (ALT 250 FOR IP): Performed by: INTERNAL MEDICINE

## 2021-11-27 PROCEDURE — C9113 INJ PANTOPRAZOLE SODIUM, VIA: HCPCS | Performed by: INTERNAL MEDICINE

## 2021-11-27 PROCEDURE — 85014 HEMATOCRIT: CPT

## 2021-11-27 PROCEDURE — 2720000010 HC SURG SUPPLY STERILE: Performed by: INTERNAL MEDICINE

## 2021-11-27 PROCEDURE — 99291 CRITICAL CARE FIRST HOUR: CPT | Performed by: INTERNAL MEDICINE

## 2021-11-27 PROCEDURE — 0W3P8ZZ CONTROL BLEEDING IN GASTROINTESTINAL TRACT, VIA NATURAL OR ARTIFICIAL OPENING ENDOSCOPIC: ICD-10-PCS | Performed by: INTERNAL MEDICINE

## 2021-11-27 PROCEDURE — 82803 BLOOD GASES ANY COMBINATION: CPT

## 2021-11-27 PROCEDURE — 94003 VENT MGMT INPAT SUBQ DAY: CPT

## 2021-11-27 PROCEDURE — 2709999900 HC NON-CHARGEABLE SUPPLY: Performed by: INTERNAL MEDICINE

## 2021-11-27 PROCEDURE — 3609013000 HC EGD TRANSORAL CONTROL BLEEDING ANY METHOD: Performed by: INTERNAL MEDICINE

## 2021-11-27 DEVICE — WORKING LENGTH 235CM, WORKING CHANNEL 2.8MM
Type: IMPLANTABLE DEVICE | Site: STOMACH | Status: FUNCTIONAL
Brand: RESOLUTION 360 CLIP

## 2021-11-27 RX ADMIN — CARBOXYMETHYLCELLULOSE SODIUM 1 DROP: 10 GEL OPHTHALMIC at 12:11

## 2021-11-27 RX ADMIN — RIFAXIMIN 550 MG: 550 TABLET ORAL at 09:11

## 2021-11-27 RX ADMIN — Medication 0.7 MCG/KG/HR: at 19:41

## 2021-11-27 RX ADMIN — SODIUM CHLORIDE, PRESERVATIVE FREE 10 ML: 5 INJECTION INTRAVENOUS at 19:34

## 2021-11-27 RX ADMIN — MUPIROCIN: 20 OINTMENT TOPICAL at 19:34

## 2021-11-27 RX ADMIN — Medication 20 G: at 04:54

## 2021-11-27 RX ADMIN — Medication 20 G: at 00:21

## 2021-11-27 RX ADMIN — Medication 0.3 MCG/KG/HR: at 03:29

## 2021-11-27 RX ADMIN — CEFTRIAXONE SODIUM 1000 MG: 1 INJECTION, POWDER, FOR SOLUTION INTRAMUSCULAR; INTRAVENOUS at 17:48

## 2021-11-27 RX ADMIN — RIFAXIMIN 550 MG: 550 TABLET ORAL at 19:37

## 2021-11-27 RX ADMIN — Medication 15 ML: at 09:07

## 2021-11-27 RX ADMIN — SODIUM CHLORIDE, PRESERVATIVE FREE 10 ML: 5 INJECTION INTRAVENOUS at 09:10

## 2021-11-27 RX ADMIN — FENTANYL CITRATE 75 MCG/HR: 50 INJECTION, SOLUTION INTRAMUSCULAR; INTRAVENOUS at 19:39

## 2021-11-27 RX ADMIN — PROPOFOL 10 MCG/KG/MIN: 10 INJECTION, EMULSION INTRAVENOUS at 06:59

## 2021-11-27 RX ADMIN — CARBOXYMETHYLCELLULOSE SODIUM 1 DROP: 10 GEL OPHTHALMIC at 15:16

## 2021-11-27 RX ADMIN — CARBOXYMETHYLCELLULOSE SODIUM 1 DROP: 10 GEL OPHTHALMIC at 04:44

## 2021-11-27 RX ADMIN — Medication 15 ML: at 19:33

## 2021-11-27 RX ADMIN — MUPIROCIN: 20 OINTMENT TOPICAL at 09:10

## 2021-11-27 RX ADMIN — Medication 20 G: at 17:48

## 2021-11-27 RX ADMIN — CARBOXYMETHYLCELLULOSE SODIUM 1 DROP: 10 GEL OPHTHALMIC at 09:07

## 2021-11-27 RX ADMIN — PANTOPRAZOLE SODIUM 40 MG: 40 INJECTION, POWDER, FOR SOLUTION INTRAVENOUS at 09:11

## 2021-11-27 RX ADMIN — CARBOXYMETHYLCELLULOSE SODIUM 1 DROP: 10 GEL OPHTHALMIC at 00:21

## 2021-11-27 RX ADMIN — PANTOPRAZOLE SODIUM 40 MG: 40 INJECTION, POWDER, FOR SOLUTION INTRAVENOUS at 19:36

## 2021-11-27 RX ADMIN — CARBOXYMETHYLCELLULOSE SODIUM 1 DROP: 10 GEL OPHTHALMIC at 19:34

## 2021-11-27 RX ADMIN — FENTANYL CITRATE 75 MCG/HR: 50 INJECTION, SOLUTION INTRAMUSCULAR; INTRAVENOUS at 08:44

## 2021-11-27 ASSESSMENT — PULMONARY FUNCTION TESTS
PIF_VALUE: 17
PIF_VALUE: 16
PIF_VALUE: 17
PIF_VALUE: 16
PIF_VALUE: 20
PIF_VALUE: 16
PIF_VALUE: 17
PIF_VALUE: 14
PIF_VALUE: 17
PIF_VALUE: 16
PIF_VALUE: 17
PIF_VALUE: 18
PIF_VALUE: 16
PIF_VALUE: 19
PIF_VALUE: 17
PIF_VALUE: 16
PIF_VALUE: 17
PIF_VALUE: 16
PIF_VALUE: 17
PIF_VALUE: 17
PIF_VALUE: 16
PIF_VALUE: 17
PIF_VALUE: 17

## 2021-11-27 NOTE — PROGRESS NOTES
11/26/21 1914   Vent Information   Vent Type 980   Vent Mode AC/VC+   Vt Ordered 450 mL   Rate Set 12 bmp   Pressure Support 0 cmH20   FiO2  30 %   SpO2 98 %   SpO2/FiO2 ratio 326.67   Sensitivity 3   PEEP/CPAP 5   I Time/ I Time % 1 s   Vent Patient Data   Peak Inspiratory Pressure 17 cmH2O   Mean Airway Pressure 8.5 cmH20   Rate Measured 12 br/min   Vt Exhaled 461 mL   Minute Volume 5.52 Liters   I:E Ratio 1:2.30   Cough/Sputum   Sputum How Obtained Endotracheal   Cough Non-productive   Sputum Amount None   Spontaneous Breathing Trial (SBT) RT Doc   Pulse 70   Breath Sounds   Right Upper Lobe Diminished   Right Middle Lobe Diminished   Right Lower Lobe Diminished   Left Upper Lobe Diminished   Left Lower Lobe Diminished   Additional Respiratory  Assessments   Position Semi-Smallwood's   Cuff Pressure (cm H2O) 28 cm H2O   Alarm Settings   High Pressure Alarm 40 cmH2O   Low Minute Volume Alarm 3 L/min   High Respiratory Rate 40 br/min   Low Exhaled Vt  200 mL   ETT (adult)   Placement Date/Time: 11/24/21 1845   Preoxygenation: Yes  Mask Ventilation: Ventilated by mask (1); Ventilated by mask with oral airway (2)  Technique: Direct laryngoscopy  Type: Cuffed  Tube Size: 8 mm  Laryngoscope: GlideScope  Blade Size: 3  Locati. ..    Secured at 25 cm   Measured From Lips   ET Placement Left   Secured By Commercial tube frankel   Site Condition Dry

## 2021-11-27 NOTE — PROGRESS NOTES
11/26/21 2309   Vent Information   Vent Type 980   Vent Mode AC/VC+   Vt Ordered 450 mL   Rate Set 12 bmp   Pressure Support 0 cmH20   FiO2  30 %   SpO2 98 %   SpO2/FiO2 ratio 326.67   Sensitivity 3   PEEP/CPAP 5   I Time/ I Time % 1 s   Humidification Source HME   Vent Patient Data   Peak Inspiratory Pressure 16 cmH2O   Mean Airway Pressure 8.19 cmH20   Rate Measured 12 br/min   Vt Exhaled 459 mL   Minute Volume 5.54 Liters   I:E Ratio 1:2.30   Cough/Sputum   Sputum How Obtained Endotracheal   Cough Non-productive   Sputum Amount None   Spontaneous Breathing Trial (SBT) RT Doc   Pulse 57   Breath Sounds   Right Upper Lobe Diminished   Right Middle Lobe Diminished   Right Lower Lobe Diminished   Left Upper Lobe Diminished   Left Lower Lobe Diminished   Additional Respiratory  Assessments   Position Semi-Smallwood's   Cuff Pressure (cm H2O) 28 cm H2O   Alarm Settings   High Pressure Alarm 40 cmH2O   Low Minute Volume Alarm 3 L/min   High Respiratory Rate 40 br/min   Low Exhaled Vt  200 mL   ETT (adult)   Placement Date/Time: 11/24/21 1845   Preoxygenation: Yes  Mask Ventilation: Ventilated by mask (1); Ventilated by mask with oral airway (2)  Technique: Direct laryngoscopy  Type: Cuffed  Tube Size: 8 mm  Laryngoscope: GlideScope  Blade Size: 3  Locati. ..    Secured at 25 cm   Measured From Lips   ET Placement Right   Secured By Commercial tube frankel   Site Condition Dry

## 2021-11-27 NOTE — PROGRESS NOTES
PROGRESS NOTE  S:71 yrs Patient  admitted on 11/23/2021 with Hepatic encephalopathy ([facility] [address] [K72.90]  Anemia, unspecified type [D64.9] . Intubated sedated    [facility] Schedued Meds   carboxymethylcellulose PF  1 drop Both Eyes 6 times per day    chlorhexidine  15 mL Mouth/Throat BID    pantoprazole  40 mg IntraVENous BID    mupirocin   Nasal BID    lactulose  20 g Oral 4 times per day    rifaximin  550 mg Oral BID    sodium chloride flush  5-40 mL IntraVENous 2 times per day    cefTRIAXone (ROCEPHIN) IV  1,000 mg IntraVENous Q24H     [facility] IV Meds   dexmedetomidine HCl in NaCl 0.3 mcg/kg/hr (11/26/21 1400)    sodium chloride      norepinephrine 4 mcg/min (11/26/21 1821)    propofol 10 mcg/kg/min (11/26/21 1500)    fentaNYL (SUBLIMAZE) infusion 75 mcg/hr (11/26/21 1735)    sodium chloride       [facility] PRN Meds  sodium chloride, potassium chloride, sodium chloride, sodium chloride flush, ondansetron **OR** ondansetron, acetaminophen **OR** acetaminophen    Exam:   Vitals:    11/26/21 2309   BP:    Pulse: 57   Resp:    Temp:    SpO2: 98%     I/O last 3 completed shifts: In: 8671 [I.V.:1653;  NG/GT:60]  Out: 1125 [Urine:425; Stool:700]   General appearance: intubated  HEENT: Sclera clear, anicteric  Neck: no adenopathy  Lungs: clear to auscultation bilaterally  Heart: regular rate and rhythm, S1, S2 normal, no murmur, click, rub or gallop  Abdomen: soft, non-tender; bowel sounds normal; no masses,  no organomegaly  Extremities: extremities normal, atraumatic, no cyanosis or edema     Labs:  CBC:   Recent Labs     11/24/21  1031 11/24/21  1211 11/25/21  0700 11/25/21  0752 11/25/21  2300 11/26/21  0600 11/26/21  1347   WBC 16.3*  --  7.3  --   --  7.8  --    HGB 8.7*   < > 6.5*   < > 9.0* 7.6* 9.1*   HCT 26.4*   < > 19.6*   < > 27.1* 23.1* 27.2*   MCV 81.0  --  82.6  --   --  83.3  --    *  --  90*  --   --  87*  --     < > = values in this interval not displayed. BMP:   Recent Labs     11/25/21  1730 11/25/21  1730 11/26/21  0600 11/26/21  1347 11/26/21  1740     --  145  --  147*   K 3.4*   < > 2.9* 3.6 3.7   *  --  120*  --  124*   CO2 20*  --  21  --  20*   BUN 31*  --  35*  --  34*   CREATININE <0.5*  --  <0.5*  --  <0.5*    < > = values in this interval not displayed. LIVER PROFILE:   Recent Labs     11/25/21  1730 11/26/21  0600 11/26/21  1740   AST 54* 44* 48*   ALT 29 27 27   PROT 5.1* 4.5* 4.7*   BILITOT 4.0* 3.6* 3.4*   ALKPHOS 84 67 71     PT/INR:   Recent Labs     11/25/21  1000   INR 2.33*       IMAGING:  XR CHEST PORTABLE    Result Date: 11/25/2021  EXAMINATION: ONE XRAY VIEW OF THE CHEST 11/25/2021 6:37 am COMPARISON: 11/24/2021 HISTORY: ORDERING SYSTEM PROVIDED HISTORY: CVC placement TECHNOLOGIST PROVIDED HISTORY: Reason for exam:->CVC placement Reason for Exam: CVC placement Acuity: Acute Type of Exam: Initial FINDINGS: ETT and NG tube remain in satisfactory position. A right IJ catheter is been placed with tip over the lower SVC. There is no pneumothorax, consolidation or effusion. Heart size and vascularity are stable. Line placement without complicating feature. Hospital Problems           Last Modified POA    Hepatic encephalopathy ([address] 11/23/2021 Yes         Impression:  79 yo female with hepatic encephalopathy and acute blood loss anemia. EGD demonstrated gastric Dieulafoy s/p endoclip    Recommendation:  1. Received blood for continued hemoglobin drop.   Will plan 2nd look EGD      Cesar Ramírez DO  11:17 PM 11/26/2021            [facility]    [address]      [facility] [facility]     Phone: [phone]     Fax: [phone]

## 2021-11-27 NOTE — PROGRESS NOTES
11/27/21 0309   Vent Information   Vent Type 980   Vent Mode AC/VC+   Vt Ordered 450 mL   Rate Set 12 bmp   Pressure Support 0 cmH20   FiO2  30 %   Sensitivity 3   PEEP/CPAP 5   I Time/ I Time % 1 s   Humidification Source HME   Vent Patient Data   Peak Inspiratory Pressure 16 cmH2O   Mean Airway Pressure 8.4 cmH20   Rate Measured 12 br/min   Vt Exhaled 459 mL   Minute Volume 5.51 Liters   I:E Ratio 1:2.30   Cough/Sputum   Sputum How Obtained Endotracheal   Cough Non-productive   Sputum Amount None   Spontaneous Breathing Trial (SBT) RT Doc   Pulse 58   Breath Sounds   Right Upper Lobe Diminished   Right Middle Lobe Diminished   Right Lower Lobe Diminished   Left Upper Lobe Diminished   Left Lower Lobe Diminished   Additional Respiratory  Assessments   Position Semi-Smallwood's   Cuff Pressure (cm H2O) 28 cm H2O   Alarm Settings   High Pressure Alarm 40 cmH2O   Low Minute Volume Alarm 3 L/min   High Respiratory Rate 40 br/min   Low Exhaled Vt  200 mL   ETT (adult)   Placement Date/Time: 11/24/21 1845   Preoxygenation: Yes  Mask Ventilation: Ventilated by mask (1); Ventilated by mask with oral airway (2)  Technique: Direct laryngoscopy  Type: Cuffed  Tube Size: 8 mm  Laryngoscope: GlideScope  Blade Size: 3  Locati. ..    Secured at 25 cm   Measured From [address] [place] [address],[address] By [employer] tube frankel   Site Condition Dry

## 2021-11-27 NOTE — PROGRESS NOTES
11/27/21 1626   Vent Information   Vent Type 980   Vent Mode AC/VC+   Vt Ordered 450 mL   Rate Set 12 bmp   Pressure Support 0 cmH20   FiO2  30 %   SpO2 98 %   SpO2/FiO2 ratio 326.67   Sensitivity 3   PEEP/CPAP 5   I Time/ I Time % 1 s   Vent Patient Data   Peak Inspiratory Pressure 16 cmH2O   Mean Airway Pressure 8.4 cmH20   Rate Measured 12 br/min   Vt Exhaled 460 mL   Minute Volume 5.52 Liters   I:E Ratio 1:2.30   Spontaneous Breathing Trial (SBT) RT Doc   Pulse 68   Breath Sounds   Right Upper Lobe Diminished   Right Middle Lobe Diminished   Right Lower Lobe Diminished   Left Upper Lobe Diminished   Left Lower Lobe Diminished   Alarm Settings   High Pressure Alarm 40 cmH2O   Low Minute Volume Alarm 3 L/min   High Respiratory Rate 40 br/min   Low Exhaled Vt  200 mL   ETT (adult)   Placement Date/Time: 11/24/21 1845   Preoxygenation: Yes  Mask Ventilation: Ventilated by mask (1); Ventilated by mask with oral airway (2)  Technique: Direct laryngoscopy  Type: Cuffed  Tube Size: 8 mm  Laryngoscope: GlideScope  Blade Size: 3  Locati. ..    Secured at 26 cm   Measured From Lips   ET Placement Right   Secured By Commercial tube frankel

## 2021-11-27 NOTE — PROGRESS NOTES
Pulmonary & Critical Care Medicine ICU Progress Note    Admit Date: 2021  PCP: Su Luevano    CC:  Respiratory failure   Events of Last 24 hours:    No major events overnight   She gets agitated with sedation holiday. She is on propofol at 10, precedex 0.3 and fentanyl at 75  On lactulose, has rectal tube with good output. Vitals:  Tmax:  VITALS:  BP (!) 123/52   Pulse 59   Temp 96.3 °F (35.7 °C)   Resp 12   Ht 5' 5\" (1.651 m)   Wt 123 lb 7.3 oz (56 kg)   SpO2 99%   BMI 20.54 kg/m²   24HR INTAKE/OUTPUT:      Intake/Output Summary (Last 24 hours) at 2021 0908  Last data filed at 2021 0329  Gross per 24 hour   Intake 1445 ml   Output 1310 ml   Net 135 ml     CURRENT PULSE OXIMETRY:  SpO2: 99 %  24HR PULSE OXIMETRY RANGE:  SpO2  Av.9 %  Min: 97 %  Max: 100 %      Vent Settings:  Vent Mode: AC/VC+ Rate Set: 12 bmp/Vt Ordered: 450 mL/ /FiO2 : 30 %  PEEP5  Recent Labs     21  1006 21  0510   PHART 7.421 7.321*   VPJ1HSG 34.5* 40.1   PO2ART 128.1* 101.3         EXAM:  General: No distress. Sedated. Eyes: PERRL. No sclera icterus. No conjunctival injection. ENT: ETT in place  Neck: Trachea midline. Normal thyroid. Resp: No accessory muscle use. CV: Regular rate. Regular rhythm. No mumur or rub. +1 edema   GI: Non-tender. Non-distended. No masses. No organmegaly. Normal bowel sounds. Skin: Warm and dry. No nodule on exposed extremities. No rash on exposed extremities. Neuro:Intubated, sedated   Psych:  Intubated, sedated      IV:   dexmedetomidine HCl in NaCl 0.3 mcg/kg/hr (21)    sodium chloride      norepinephrine 4 mcg/min (21)    propofol 10 mcg/kg/min (2159)    fentaNYL (SUBLIMAZE) infusion 75 mcg/hr (21 0807)    sodium chloride           Scheduled Meds:     carboxymethylcellulose PF  1 drop Both Eyes 6 times per day    chlorhexidine  15 mL Mouth/Throat BID    pantoprazole  40 mg IntraVENous BID    mupirocin Nasal BID    lactulose  20 g Oral 4 times per day    rifaximin  550 mg Oral BID    sodium chloride flush  5-40 mL IntraVENous 2 times per day    cefTRIAXone (ROCEPHIN) IV  1,000 mg IntraVENous Q24H         Diet: Diet NPO Exceptions are: Ice Chips     Results:  CBC:   Recent Labs     11/25/21  0700 11/25/21  0752 11/26/21  0600 11/26/21  0600 11/26/21  1347 11/26/21  2315 11/27/21  0510   WBC 7.3  --  7.8  --   --   --  7.8   HGB 6.5*   < > 7.6*   < > 9.1* 10.1* 9.7*   HCT 19.6*   < > 23.1*   < > 27.2* 29.9* 29.4*   MCV 82.6  --  83.3  --   --   --  84.0   PLT 90*  --  87*  --   --   --  104*    < > = values in this interval not displayed. BMP:   Recent Labs     11/25/21  1730 11/25/21  1730 11/26/21  0600 11/26/21  1347 11/26/21  1740     --  145  --  147*   K 3.4*   < > 2.9* 3.6 3.7   *  --  120*  --  124*   CO2 20*  --  21  --  20*   BUN 31*  --  35*  --  34*   CREATININE <0.5*  --  <0.5*  --  <0.5*    < > = values in this interval not displayed. LIVER PROFILE:   Recent Labs     11/25/21  1730 11/26/21  0600 11/26/21  1740   AST 54* 44* 48*   ALT 29 27 27   BILITOT 4.0* 3.6* 3.4*   ALKPHOS 84 67 71     PT/INR:   Recent Labs     11/25/21  1000   PROTIME 27.3*   INR 2.33*     APTT: No results for input(s): APTT in the last 72 hours. UA:  No results for input(s): NITRITE, COLORU, PHUR, LABCAST, WBCUA, RBCUA, MUCUS, TRICHOMONAS, YEAST, BACTERIA, CLARITYU, SPECGRAV, LEUKOCYTESUR, UROBILINOGEN, BILIRUBINUR, BLOODU, GLUCOSEU, AMORPHOUS in the last 72 hours. Invalid input(s): Kathya Bledsoe    Assessment/Plan:  The patient is a 80 y.o. female with     Acute respiratory failure on mechanical vent support. Intubated on 11/24 as she was aspirating due to severe encephalopathy   , RR 12, FiO2 30, PEEP 5  ABG 7.32/40/101  Hepatic encephalopathy - better. She gets agitated with sedation holiday but does not follow commands yet.     UGIB:  EGD with coffee ground coated gastric mucosa but no active bleed.  She had Dieulafoy lesion but was not bleeding and was clipped. GI planning 2nd scope today   Anemia:  She received one unit of PRBC yesterday and one the day before. HGB is currently stable over the past 24 hours. Lactic acidemia - improved   Leukocytosis with left shift - better   Hypotension requiring levophed on and off. Currently at 4 mcg/min       Since there is a plan for EGD I will keep her on vent support till after EGD. SBT after   Lactulose/Rifaximin   Continue PPI and ceftriaxone  Not on TF. There is a plan for EGD today     Pt has a high probability of imminent or life-threatening deterioration requiring close monitoring, and highly complex decision-making and/or interventions of high intensity to assess, manipulate, and support his critical organ systems to prevent a likely inevitable decline which could occur if left untreated.      A total critical care time 35 minutes was used. This includes but not limited to examining patient, collaborating with other physicians, monitoring vital signs, telemetry, continuous pulse oximetry, and clinical response to IV medications, documentation time, review and interpretation of laboratory and radiological data, review of nursing notes and old record review. This time excludes any time that may have been spent performing procedures for life threatening organ failure.     Cherelle Ruiz MD, MD

## 2021-11-27 NOTE — H&P
Gastroenterology Preop Assessment    Patient:   Siva Law   :    1950   Facility:   [facility]  Referring/PCP: Jeramie Baugh  Date:     2021    Subjective:   Procedure: egd    HPI/Reason for procedure:  Acute blood loss anemia    Past Medical History:   Diagnosis Date    Esophagitis     Hypertension      Past Surgical History:   Procedure Laterality Date    ENDOSCOPIC ULTRASOUND (UPPER)      UPPER GASTROINTESTINAL ENDOSCOPY N/A 2021    EGD CONTROL HEMORRHAGE performed by Gertrudis Payne MD at [address]:   Social History     Tobacco Use    Smoking status: Never Smoker    Smokeless tobacco: Never Used   Substance Use Topics    Alcohol use: Yes     Family: No family history on file. Scheduled Medications:    carboxymethylcellulose PF  1 drop Both Eyes 6 times per day    chlorhexidine  15 mL Mouth/Throat BID    pantoprazole  40 mg IntraVENous BID    mupirocin   Nasal BID    lactulose  20 g Oral 4 times per day    rifaximin  550 mg Oral BID    sodium chloride flush  5-40 mL IntraVENous 2 times per day    cefTRIAXone (ROCEPHIN) IV  1,000 mg IntraVENous Q24H       Current Medications:    Prior to Admission medications    Medication Sig Start Date End Date Taking?  Authorizing Provider   naloxone 4 MG/0.1ML LIQD nasal spray 1 spray by Nasal route as needed for Opioid Reversal 10/21/20   Chuck Matta MD   cyclobenzaprine (FLEXERIL) 10 MG tablet TAKE 1 TABLET BY MOUTH EVERY 12 HOURS AS NEEDED FOR PAIN. 3/11/19   Amalia Aleman MD   rifaximin (XIFAXAN) 550 MG tablet Take 1 tablet by mouth 2 times daily 12/10/18   Thu Pierce MD   pantoprazole (PROTONIX) 40 MG tablet Take 1 tablet by mouth every morning (before breakfast) 18   Thu Pierce MD         Current Facility-Administered Medications:     carboxymethylcellulose PF (THERATEARS) 1 % ophthalmic gel 1 drop, 1 drop, Both Eyes, 6 times per day, Lyn Alvarenga MD, 1 drop at 11/27/21 1211    chlorhexidine (PERIDEX) 0.12 % solution 15 mL, 15 mL, Mouth/Throat, BID, Adolfo Ruff MD, 15 mL at 11/27/21 0907    pantoprazole (PROTONIX) injection 40 mg, 40 mg, IntraVENous, BID, Adolfo Ruff MD, 40 mg at 11/27/21 0911    dexmedetomidine (PRECEDEX) 400 mcg in sodium chloride 0.9 % 100 mL infusion, 0.2-1.4 mcg/kg/hr, IntraVENous, Continuous, Adolfo Ruff MD, Last Rate: 4.2 mL/hr at 11/27/21 1200, 0.3 mcg/kg/hr at 11/27/21 1200    0.9 % sodium chloride infusion, , IntraVENous, PRN, Adolfo Ruff MD    norepinephrine (LEVOPHED) 16 mg in dextrose 5 % 250 mL infusion, 2-100 mcg/min, IntraVENous, Continuous, Vazquez Ng DO, Last Rate: 3.8 mL/hr at 11/27/21 1200, 4 mcg/min at 11/27/21 1200    potassium chloride 20 mEq/50 mL IVPB (Central Line), 20 mEq, IntraVENous, PRN, Vazquez Ng DO, Last Rate: 50 mL/hr at 11/26/21 0938, 20 mEq at 11/26/21 0938    mupirocin (BACTROBAN) 2 % ointment, , Nasal, BID, Fer Connelly MD, Given at 11/27/21 0910    lactulose (CHRONULAC) 10 GM/15ML solution 20 g, 20 g, Oral, 4 times per day, Fer Connelly MD, 20 g at 11/27/21 0454    propofol injection, 5-50 mcg/kg/min, IntraVENous, Titrated, Adolfo Ruff MD, Last Rate: 3.4 mL/hr at 11/27/21 1200, 10 mcg/kg/min at 11/27/21 1200    rifaximin (XIFAXAN) tablet 550 mg, 550 mg, Oral, BID, Adolfo Ruff MD, 550 mg at 11/27/21 0911    fentaNYL (SUBLIMAZE) 1,000 mcg in sodium chloride 0.9 % 100 mL infusion, 12.5-200 mcg/hr, IntraVENous, Continuous, Vazquez Ng DO, Last Rate: 7.5 mL/hr at 11/27/21 1200, 75 mcg/hr at 11/27/21 1200    0.9 % sodium chloride infusion, , IntraVENous, PRN, Fer Connelly MD    sodium chloride flush 0.9 % injection 5-40 mL, 5-40 mL, IntraVENous, 2 times per day, Fer Connelly MD, 10 mL at 11/27/21 0910    sodium chloride flush 0.9 % injection 5-40 mL, 5-40 mL, IntraVENous, PRN, Fer Connelly MD    ondansetron (ZOFRAN-ODT) disintegrating tablet 4 mg, 4 mg, Oral, Q8H PRN **OR** ondansetron (ZOFRAN) injection 4 mg, 4 mg, IntraVENous, Q6H PRN, Carolann Waterman MD    acetaminophen (TYLENOL) tablet 650 mg, 650 mg, Oral, Q6H PRN **OR** acetaminophen (TYLENOL) suppository 650 mg, 650 mg, Rectal, Q6H PRN, Carolann Waterman MD    cefTRIAXone (ROCEPHIN) 1000 mg IVPB in 50 mL D5W minibag, 1,000 mg, IntraVENous, Q24H, Adolfo Ruff MD, Stopped at 11/26/21 1804      Infusions:    dexmedetomidine HCl in NaCl 0.3 mcg/kg/hr (11/27/21 1200)    sodium chloride      norepinephrine 4 mcg/min (11/27/21 1200)    propofol 10 mcg/kg/min (11/27/21 1200)    fentaNYL (SUBLIMAZE) infusion 75 mcg/hr (11/27/21 1200)    sodium chloride       PRN Medications: sodium chloride, potassium chloride, sodium chloride, sodium chloride flush, ondansetron **OR** ondansetron, acetaminophen **OR** acetaminophen  Allergies: Allergies   Allergen Reactions    Azithromycin Nausea And Vomiting    Sulfa Antibiotics Rash         Objective:     Physical Exam:   BP (!) 111/50   Pulse 67   Temp 97.2 °F (36.2 °C) (Bladder)   Resp 12   Ht 5' 5\" (1.651 m)   Wt 123 lb 7.3 oz (56 kg)   SpO2 98%   BMI 20.54 kg/m²     HEENT: NCAT  Lungs: CTAB  CV: RRR  Abd: soft, ntd  Ext: dpi    Lab and Imaging Review   Labs:  CBC:   Recent Labs     11/25/21  0700 11/25/21  0752 11/26/21  0600 11/26/21  0600 11/26/21  1347 11/26/21  2315 11/27/21  0510   WBC 7.3  --  7.8  --   --   --  7.8   HGB 6.5*   < > 7.6*   < > 9.1* 10.1* 9.7*   HCT 19.6*   < > 23.1*   < > 27.2* 29.9* 29.4*   MCV 82.6  --  83.3  --   --   --  84.0   PLT 90*  --  87*  --   --   --  104*    < > = values in this interval not displayed.      BMP:   Recent Labs     11/25/21  1730 11/25/21  1730 11/26/21  0600 11/26/21  1347 11/26/21  1740     --  145  --  147*   K 3.4*   < > 2.9* 3.6 3.7   *  --  120*  --  124*   CO2 20*  --  21  --  20*   BUN 31*  --  35*  --  34*   CREATININE <0.5*  -- <0.5*  --  <0.5*    < > = values in this interval not displayed. LIVER PROFILE:   Recent Labs     11/25/21  1730 11/26/21  0600 11/26/21  1740   AST 54* 44* 48*   ALT 29 27 27   PROT 5.1* 4.5* 4.7*   BILITOT 4.0* 3.6* 3.4*   ALKPHOS 84 67 71     PT/INR:   Recent Labs     11/25/21  1000   INR 2.33*       Pre-Procedure Assessment / Plan:  ASA: Class 3 - A patient with severe systemic disease that limits activity but is not incapacitating  Airway: Mallampati: II (soft palate, uvula, fauces visible)  Level of Sedation Plan:Deep sedation  Post Procedure plan: Return to same level of care      Plan:   EGD  I assessed the patient and find that the patient is in satisfactory condition to proceed with the planned procedure and sedation plan. I have explained the risk, benefits, and alternatives to the procedure; the patient understands and agrees to proceed.        Carley Bunch DO  11/27/2021

## 2021-11-27 NOTE — PROGRESS NOTES
Hospitalist Progress Note      PCP: Oralia Preston    Date of Admission: 11/23/2021         Chief [address] Course:   80 y.o. female with cirrhosis, etoh use who presented to [facility] with confusion/hepatic encephalopathy and GIB. Placed in ICU.      Subjective:  Remains sedated/intubated, added precedex, family/ at bedside       Medications:  Reviewed    Infusion Medications    dexmedetomidine HCl in NaCl 0.3 mcg/kg/hr (11/27/21 0329)    sodium chloride      norepinephrine 4 mcg/min (11/26/21 1821)    propofol 10 mcg/kg/min (11/27/21 0659)    fentaNYL (SUBLIMAZE) infusion 75 mcg/hr (11/26/21 9095)    sodium chloride       Scheduled Medications    carboxymethylcellulose PF  1 drop Both Eyes 6 times per day    chlorhexidine  15 mL Mouth/Throat BID    pantoprazole  40 mg IntraVENous BID    mupirocin   Nasal BID    lactulose  20 g Oral 4 times per day    rifaximin  550 mg Oral BID    sodium chloride flush  5-40 mL IntraVENous 2 times per day    cefTRIAXone (ROCEPHIN) IV  1,000 mg IntraVENous Q24H     PRN Meds: sodium chloride, potassium chloride, sodium chloride, sodium chloride flush, ondansetron **OR** ondansetron, acetaminophen **OR** acetaminophen      Intake/Output Summary (Last 24 hours) at 11/27/2021 0717  Last data filed at 11/27/2021 0329  Gross per 24 hour   Intake 1505 ml   Output 1400 ml   Net 105 ml       Physical Exam Performed:    BP (!) 124/50   Pulse 55   Temp 95.4 °F (35.2 °C) (Bladder)   Resp 12   Ht 5' 5\" (1.651 m)   Wt 123 lb 7.3 oz (56 kg)   SpO2 99%   BMI 20.54 kg/m²     General appearance:  No apparent distress, appears stated age and cooperative. Intubated/sedated  HEENT:  Normal cephalic, atraumatic without obvious deformity. Pupils equal, round, and reactive to light.  Extra ocular muscles intact. Conjunctivae/corneas clear. NG in place with signif dark red outpt  Neck: Supple, with full range of motion.  No jugular venous distention. Trachea midline. Respiratory:  inc'd respiratory effort. Clear to auscultation, bilaterally without Rales/Wheezes, some scattered Rhonchi anteriorly noted  Cardiovascular:  Regular rate and rhythm with normal S1/S2 without murmurs, rubs or gallops. Abdomen: Soft, non-tender, non-distended with normal bowel sounds. Rectal tube in place  Musculoskeletal:  No clubbing, cyanosis or edema bilaterally.  Full range of motion without deformity. Skin: Skin color, texture, turgor normal.  No rashes or lesions. Neurologic:  Neurovascularly intact without any focal sensory/motor deficits. Cranial nerves: II-XII intact, grossly non-focal.  Psychiatric:  Alert and oriented x0, sedated  Capillary Refill: Brisk,3 seconds, normal  Peripheral Pulses: +2 palpable, equal bilaterally     Labs:   Recent Labs     11/25/21  0700 11/25/21  0752 11/26/21  0600 11/26/21  0600 11/26/21  1347 11/26/21  2315 11/27/21  0510   WBC 7.3  --  7.8  --   --   --  7.8   HGB 6.5*   < > 7.6*   < > 9.1* 10.1* 9.7*   HCT 19.6*   < > 23.1*   < > 27.2* 29.9* 29.4*   PLT 90*  --  87*  --   --   --  104*    < > = values in this interval not displayed. Recent Labs     11/25/21  1730 11/25/21  1730 11/26/21  0600 11/26/21  1347 11/26/21  1740     --  145  --  147*   K 3.4*   < > 2.9* 3.6 3.7   *  --  120*  --  124*   CO2 20*  --  21  --  20*   BUN 31*  --  35*  --  34*   CREATININE <0.5*  --  <0.5*  --  <0.5*   CALCIUM 7.7*  --  7.8*  --  7.7*    < > = values in this interval not displayed. Recent Labs     11/25/21  1730 11/26/21  0600 11/26/21  1740   AST 54* 44* 48*   ALT 29 27 27   BILITOT 4.0* 3.6* 3.4*   ALKPHOS 84 67 71     Recent Labs     11/25/21  1000   INR 2.33*     No results for input(s): CKTOTAL, TROPONINI in the last 72 hours.     Urinalysis:      Lab Results   Component Value Date    NITRU Negative 11/23/2021    BLOODU Negative 11/23/2021    SPECGRAV 1.015 11/23/2021    GLUCOSEU Negative 11/23/2021 Radiology:  XR CHEST PORTABLE   Final Result   Line placement without complicating feature. XR CHEST PORTABLE   Final Result   1. The endotracheal tube tip is 3 cm above the maria g. 2. Clear lungs. XR ABDOMEN (KUB) (SINGLE AP VIEW)   Final Result   Enteric tube side port projects over the expected region of the GE junction,   recommend advancement. XR ABDOMEN (KUB) (SINGLE AP VIEW)   Final Result   Enteric tube tip and side port project over the stomach. CT ABDOMEN PELVIS W IV CONTRAST Additional Contrast? None   Final Result   1. Cirrhosis with evidence of portal hypertension including multiple upper   varices including distal esophageal and gastric varices as well as a   spontaneous splenorenal shunt. Mild third-spacing with edematous changes   throughout the abdomen and subcutaneous soft tissues. 2. Cholelithiasis with no acute features. 3. Distention with mural thickening in the duodenal sweep and proximal small   bowel. There is no evidence of obstruction. Findings may be related to a   duodenitis and enteritis. 4. Mural thickening of the distal esophagus with evidence of reflux, likely a   reflux esophagitis. CT HEAD WO CONTRAST   Final Result   Atrophy and mild small vessel ischemic disease. XR CHEST PORTABLE   Final Result   No radiographic evidence of acute pulmonary disease.                  Assessment/Plan:    Active Hospital Problems    Diagnosis     Hepatic encephalopathy ([facility] [address] [K72.90]            Hepatic encephalopathy- with ammonia of 176 on arrival, CThead was unremarkable for bleed  -GI consulted, s/p EGD 11/24(noted nonbleeding gastric dieulafoy lesion, endoclipped)  -NG placed in ER, lactulose was ordered  -ppi ggt  -was on octreotide ggt  -ICU care     Possible Resp failure- related to above,   -intubated 11/24 pm to secure airway given encephalopathy and concern for aspiration  -apprec pulm/cc mgmt  -CVC placed 11/25     Acute blood loss Anemia- with concern for coffee ground emesis and upper GIB(?variceal)  -trended h/h  -was on ppi ggt  -2u prbc ordered in ER  -tele  -Iv rocephin started 11/23, end after 7 doses   -2u prbc 11/25     Lactic acidosis- concern related to above anemia, CTa/p without obvious pathology  -trended labs  -ivf bolus given     HTN- per emr,not on meds  -monitor VS     Transaminitis/Cirrhosis/coagulopathy- sees Justen Raghav, apparently was taking rifaximin per emr  -started lactulose via NG on 11/24   -vit K iv ordered 11/25     DVT Prophylaxis: scd  Diet: Diet NPO Exceptions are: Ice Chips  Code Status: Full Code       PT/OT Eval Status: not ordered     Dispo - icu care, guarded prognosis, attempt weaning of vent, repeat EGD planned    Joseph Dowd MD

## 2021-11-28 LAB
AMMONIA: 50 UMOL/L (ref 11–51)
ANION GAP SERPL CALCULATED.3IONS-SCNC: 6 MMOL/L (ref 3–16)
BASE EXCESS ARTERIAL: -4.7 MMOL/L (ref -3–3)
BASOPHILS ABSOLUTE: 0.1 K/UL (ref 0–0.2)
BASOPHILS RELATIVE PERCENT: 1 %
BUN BLDV-MCNC: 26 MG/DL (ref 7–20)
CALCIUM SERPL-MCNC: 8 MG/DL (ref 8.3–10.6)
CARBOXYHEMOGLOBIN ARTERIAL: 0.1 % (ref 0–1.5)
CHLORIDE BLD-SCNC: 119 MMOL/L (ref 99–110)
CO2: 21 MMOL/L (ref 21–32)
CREAT SERPL-MCNC: <0.5 MG/DL (ref 0.6–1.2)
EOSINOPHILS ABSOLUTE: 0.3 K/UL (ref 0–0.6)
EOSINOPHILS RELATIVE PERCENT: 3.3 %
GFR AFRICAN AMERICAN: >60
GFR NON-AFRICAN AMERICAN: >60
GLUCOSE BLD-MCNC: 142 MG/DL (ref 70–99)
HCO3 ARTERIAL: 19.8 MMOL/L (ref 21–29)
HCT VFR BLD CALC: 31 % (ref 36–48)
HEMOGLOBIN, ART, EXTENDED: 10.8 G/DL (ref 12–16)
HEMOGLOBIN: 10.3 G/DL (ref 12–16)
LYMPHOCYTES ABSOLUTE: 1.3 K/UL (ref 1–5.1)
LYMPHOCYTES RELATIVE PERCENT: 13.9 %
MCH RBC QN AUTO: 28.5 PG (ref 26–34)
MCHC RBC AUTO-ENTMCNC: 33.1 G/DL (ref 31–36)
MCV RBC AUTO: 86.3 FL (ref 80–100)
METHEMOGLOBIN ARTERIAL: 0.6 %
MONOCYTES ABSOLUTE: 1.1 K/UL (ref 0–1.3)
MONOCYTES RELATIVE PERCENT: 11.7 %
NEUTROPHILS ABSOLUTE: 6.8 K/UL (ref 1.7–7.7)
NEUTROPHILS RELATIVE PERCENT: 70.1 %
O2 SAT, ARTERIAL: 97.5 %
O2 THERAPY: ABNORMAL
PCO2 ARTERIAL: 34.4 MMHG (ref 35–45)
PDW BLD-RTO: 18.9 % (ref 12.4–15.4)
PH ARTERIAL: 7.38 (ref 7.35–7.45)
PLATELET # BLD: 106 K/UL (ref 135–450)
PLATELET SLIDE REVIEW: ABNORMAL
PMV BLD AUTO: 7.4 FL (ref 5–10.5)
PO2 ARTERIAL: 109.8 MMHG (ref 75–108)
POTASSIUM SERPL-SCNC: 3.3 MMOL/L (ref 3.5–5.1)
RBC # BLD: 3.59 M/UL (ref 4–5.2)
SLIDE REVIEW: ABNORMAL
SODIUM BLD-SCNC: 146 MMOL/L (ref 136–145)
TCO2 ARTERIAL: 20.9 MMOL/L
WBC # BLD: 9.7 K/UL (ref 4–11)

## 2021-11-28 PROCEDURE — 2580000003 HC RX 258: Performed by: INTERNAL MEDICINE

## 2021-11-28 PROCEDURE — 6370000000 HC RX 637 (ALT 250 FOR IP): Performed by: INTERNAL MEDICINE

## 2021-11-28 PROCEDURE — 82140 ASSAY OF AMMONIA: CPT

## 2021-11-28 PROCEDURE — 2500000003 HC RX 250 WO HCPCS: Performed by: INTERNAL MEDICINE

## 2021-11-28 PROCEDURE — 94003 VENT MGMT INPAT SUBQ DAY: CPT

## 2021-11-28 PROCEDURE — 6360000002 HC RX W HCPCS: Performed by: INTERNAL MEDICINE

## 2021-11-28 PROCEDURE — C9113 INJ PANTOPRAZOLE SODIUM, VIA: HCPCS | Performed by: INTERNAL MEDICINE

## 2021-11-28 PROCEDURE — 2700000000 HC OXYGEN THERAPY PER DAY

## 2021-11-28 PROCEDURE — 99291 CRITICAL CARE FIRST HOUR: CPT | Performed by: INTERNAL MEDICINE

## 2021-11-28 PROCEDURE — 36592 COLLECT BLOOD FROM PICC: CPT

## 2021-11-28 PROCEDURE — 85025 COMPLETE CBC W/AUTO DIFF WBC: CPT

## 2021-11-28 PROCEDURE — 2000000000 HC ICU R&B

## 2021-11-28 PROCEDURE — 80048 BASIC METABOLIC PNL TOTAL CA: CPT

## 2021-11-28 PROCEDURE — 82803 BLOOD GASES ANY COMBINATION: CPT

## 2021-11-28 PROCEDURE — 94761 N-INVAS EAR/PLS OXIMETRY MLT: CPT

## 2021-11-28 RX ADMIN — MUPIROCIN: 20 OINTMENT TOPICAL at 22:00

## 2021-11-28 RX ADMIN — PANTOPRAZOLE SODIUM 40 MG: 40 INJECTION, POWDER, FOR SOLUTION INTRAVENOUS at 07:42

## 2021-11-28 RX ADMIN — FENTANYL CITRATE 75 MCG/HR: 50 INJECTION, SOLUTION INTRAMUSCULAR; INTRAVENOUS at 11:00

## 2021-11-28 RX ADMIN — POTASSIUM CHLORIDE 20 MEQ: 400 INJECTION, SOLUTION INTRAVENOUS at 10:57

## 2021-11-28 RX ADMIN — Medication 20 G: at 05:47

## 2021-11-28 RX ADMIN — CEFTRIAXONE SODIUM 1000 MG: 1 INJECTION, POWDER, FOR SOLUTION INTRAMUSCULAR; INTRAVENOUS at 17:12

## 2021-11-28 RX ADMIN — DEXTROSE MONOHYDRATE 3 MCG/MIN: 50 INJECTION, SOLUTION INTRAVENOUS at 11:02

## 2021-11-28 RX ADMIN — CARBOXYMETHYLCELLULOSE SODIUM 1 DROP: 10 GEL OPHTHALMIC at 11:13

## 2021-11-28 RX ADMIN — CARBOXYMETHYLCELLULOSE SODIUM 1 DROP: 10 GEL OPHTHALMIC at 16:41

## 2021-11-28 RX ADMIN — CARBOXYMETHYLCELLULOSE SODIUM 1 DROP: 10 GEL OPHTHALMIC at 02:01

## 2021-11-28 RX ADMIN — CARBOXYMETHYLCELLULOSE SODIUM 1 DROP: 10 GEL OPHTHALMIC at 06:05

## 2021-11-28 RX ADMIN — POTASSIUM CHLORIDE 20 MEQ: 400 INJECTION, SOLUTION INTRAVENOUS at 11:58

## 2021-11-28 RX ADMIN — Medication 15 ML: at 07:42

## 2021-11-28 RX ADMIN — CARBOXYMETHYLCELLULOSE SODIUM 1 DROP: 10 GEL OPHTHALMIC at 21:00

## 2021-11-28 RX ADMIN — CARBOXYMETHYLCELLULOSE SODIUM 1 DROP: 10 GEL OPHTHALMIC at 05:08

## 2021-11-28 RX ADMIN — RIFAXIMIN 550 MG: 550 TABLET ORAL at 22:20

## 2021-11-28 RX ADMIN — SODIUM CHLORIDE, PRESERVATIVE FREE 10 ML: 5 INJECTION INTRAVENOUS at 22:00

## 2021-11-28 RX ADMIN — ALTEPLASE 1 MG: 2.2 INJECTION, POWDER, LYOPHILIZED, FOR SOLUTION INTRAVENOUS at 16:41

## 2021-11-28 RX ADMIN — RIFAXIMIN 550 MG: 550 TABLET ORAL at 07:42

## 2021-11-28 RX ADMIN — Medication 20 G: at 02:03

## 2021-11-28 RX ADMIN — PANTOPRAZOLE SODIUM 40 MG: 40 INJECTION, POWDER, FOR SOLUTION INTRAVENOUS at 22:00

## 2021-11-28 RX ADMIN — Medication 0.7 MCG/KG/HR: at 16:39

## 2021-11-28 RX ADMIN — Medication 0.7 MCG/KG/HR: at 06:04

## 2021-11-28 RX ADMIN — Medication 15 ML: at 22:00

## 2021-11-28 RX ADMIN — Medication 20 G: at 17:07

## 2021-11-28 RX ADMIN — MUPIROCIN: 20 OINTMENT TOPICAL at 07:42

## 2021-11-28 RX ADMIN — SODIUM CHLORIDE, PRESERVATIVE FREE 10 ML: 5 INJECTION INTRAVENOUS at 07:42

## 2021-11-28 RX ADMIN — Medication 20 G: at 11:13

## 2021-11-28 ASSESSMENT — PULMONARY FUNCTION TESTS
PIF_VALUE: 18
PIF_VALUE: 18
PIF_VALUE: 17
PIF_VALUE: 16
PIF_VALUE: 15
PIF_VALUE: 17
PIF_VALUE: 17
PIF_VALUE: 15
PIF_VALUE: 18
PIF_VALUE: 17
PIF_VALUE: 17
PIF_VALUE: 18
PIF_VALUE: 15
PIF_VALUE: 20
PIF_VALUE: 18
PIF_VALUE: 20
PIF_VALUE: 17
PIF_VALUE: 19
PIF_VALUE: 15
PIF_VALUE: 17
PIF_VALUE: 17
PIF_VALUE: 18
PIF_VALUE: 18
PIF_VALUE: 14
PIF_VALUE: 17
PIF_VALUE: 15
PIF_VALUE: 16
PIF_VALUE: 15
PIF_VALUE: 16
PIF_VALUE: 16
PIF_VALUE: 19
PIF_VALUE: 18

## 2021-11-28 NOTE — PROGRESS NOTES
11/27/21 2314   Vent Information   Vent Type 980   Vent Mode AC/VC+   Vt Ordered 450 mL   Rate Set 12 bmp   Pressure Support 0 cmH20   FiO2  30 %   SpO2 99 %   SpO2/FiO2 ratio 330   Sensitivity 3   PEEP/CPAP 5   I Time/ I Time % 1.2 s   Humidification Source HME   Vent Patient Data   Peak Inspiratory Pressure 17 cmH2O   Mean Airway Pressure 7.5 cmH20   Rate Measured 12 br/min   Vt Exhaled 461 mL   Minute Volume 5.52 Liters   I:E Ratio 1:4.00   Cough/Sputum   Sputum How Obtained Endotracheal; Suctioned   Cough Productive   Sputum Amount Moderate   Sputum Color Yellow   Tenacity Thick   Spontaneous Breathing Trial (SBT) RT Doc   Pulse 65   Breath Sounds   Right Upper Lobe Diminished   Right Middle Lobe Diminished   Right Lower Lobe Diminished   Left Upper Lobe Diminished   Left Lower Lobe Diminished   Alarm Settings   High Pressure Alarm 40 cmH2O   Low Minute Volume Alarm 3 L/min   High Respiratory Rate 40 br/min   Low Exhaled Vt  200 mL   ETT (adult)   Placement Date/Time: 11/24/21 1845   Preoxygenation: Yes  Mask Ventilation: Ventilated by mask (1); Ventilated by mask with oral airway (2)  Technique: Direct laryngoscopy  Type: Cuffed  Tube Size: 8 mm  Laryngoscope: GlideScope  Blade Size: 3  Locati. ..    Secured at 25 cm   Measured From [address] [place] [address],[address] By Commercial tube frankel   Site Condition Dry   Cuff Pressure 34 cm H2O

## 2021-11-28 NOTE — PROGRESS NOTES
Pulmonary & Critical Care Medicine ICU Progress Note    Admit Date: 2021  PCP: Vicki Nguyen    CC:  Respiratory failure   Events of Last 24 hours:    No major events overnight   She gets agitated with sedation holiday. She is on propofol at 10, precedex 0.3 and fentanyl at 75  On lactulose, has rectal tube with good output. Vitals:  Tmax:  VITALS:  BP (!) 163/67   Pulse 71   Temp 98 °F (36.7 °C) (Bladder)   Resp 12   Ht 5' 5\" (1.651 m)   Wt 123 lb 7.3 oz (56 kg)   SpO2 99%   BMI 20.54 kg/m²   24HR INTAKE/OUTPUT:      Intake/Output Summary (Last 24 hours) at 2021 0935  Last data filed at 2021 0600  Gross per 24 hour   Intake 818.56 ml   Output 400 ml   Net 418.56 ml     CURRENT PULSE OXIMETRY:  SpO2: 99 %  24HR PULSE OXIMETRY RANGE:  SpO2  Av.5 %  Min: 98 %  Max: 100 %      Vent Settings:  Vent Mode: AC/VC+ Rate Set: 12 bmp/Vt Ordered: 450 mL/ /FiO2 : 30 %  PEEP5  Recent Labs     21  0510 21  0750   PHART 7.321* 7.378   DHV4KRU 40.1 34.4*   PO2ART 101.3 109.8*         EXAM:  General: No distress. Sedated. Eyes: PERRL. No sclera icterus. No conjunctival injection. ENT: ETT in place  Neck: Trachea midline. Normal thyroid. Resp: No accessory muscle use. CV: Regular rate. Regular rhythm. No mumur or rub. +1 edema   GI: Non-tender. Non-distended. No masses. No organmegaly. Normal bowel sounds. Skin: Warm and dry. No nodule on exposed extremities. No rash on exposed extremities. Neuro:Intubated, sedated   Psych:  Intubated, sedated      IV:   dexmedetomidine HCl in NaCl 0.7 mcg/kg/hr (21)    sodium chloride      norepinephrine 5 mcg/min (21)    propofol Stopped (21 1515)    fentaNYL (SUBLIMAZE) infusion 75 mcg/hr (21)    sodium chloride           Scheduled Meds:     carboxymethylcellulose PF  1 drop Both Eyes 6 times per day    chlorhexidine  15 mL Mouth/Throat BID    pantoprazole  40 mg IntraVENous BID    mupirocin Dieulafoy lesion but was not bleeding and was clipped. 2nd EGD on 11/27. There was no active bleeding. Anemia:  Stable over the past 48 hours. Lactic acidemia - improved   Leukocytosis with left shift - better   Hypotension requiring levophed on and off. Currently at 5 mcg/min       SBT today   Lactulose/Rifaximin   Continue PPI and ceftriaxone    Pt has a high probability of imminent or life-threatening deterioration requiring close monitoring, and highly complex decision-making and/or interventions of high intensity to assess, manipulate, and support his critical organ systems to prevent a likely inevitable decline which could occur if left untreated.      A total critical care time 35 minutes was used. This includes but not limited to examining patient, collaborating with other physicians, monitoring vital signs, telemetry, continuous pulse oximetry, and clinical response to IV medications, documentation time, review and interpretation of laboratory and radiological data, review of nursing notes and old record review. This time excludes any time that may have been spent performing procedures for life threatening organ failure.     Aline Bain MD, MD

## 2021-11-28 NOTE — PROGRESS NOTES
11/28/21 0735   Vent Information   Vent Type 980   Vent Mode AC/VC+   Vt Ordered 450 mL   Rate Set 12 bmp   Pressure Support 0 cmH20   FiO2  30 %   SpO2 99 %   SpO2/FiO2 ratio 330   Sensitivity 3   PEEP/CPAP 5   I Time/ I Time % 1.2 s   Vent Patient Data   Peak Inspiratory Pressure 17 cmH2O   Mean Airway Pressure 8.1 cmH20   Rate Measured 12 br/min   Vt Exhaled 436 mL   Minute Volume 5.77 Liters   I:E Ratio 1:3.20   Cough/Sputum   Sputum How Obtained None   Spontaneous Breathing Trial (SBT) RT Doc   Pulse 65   Breath Sounds   Right Upper Lobe Diminished   Right Middle Lobe Diminished   Right Lower Lobe Diminished   Left Upper Lobe Diminished   Left Lower Lobe Diminished   Alarm Settings   High Pressure Alarm 40 cmH2O   Low Minute Volume Alarm 3 L/min   Apnea (secs) 20 secs   High Respiratory Rate 40 br/min   Low Exhaled Vt  200 mL   Patient Observation   Observations ambu @ Bedside   ETT (adult)   Placement Date/Time: 11/24/21 3175   Preoxygenation: Yes  Mask Ventilation: Ventilated by mask (1); Ventilated by mask with oral airway (2)  Technique: Direct laryngoscopy  Type: Cuffed  Tube Size: 8 mm  Laryngoscope: GlideScope  Blade Size: 3  Locati. ..    Secured at 25 cm   Measured From [address] [place] [address],[address] By [employer] tube frankel   Site Condition Dry   Cuff Pressure 28 cm H2O

## 2021-11-28 NOTE — OP NOTE
Esophagogastroduodenoscopy Note    Patient:   Ioana Wyatt    YOB: 1950    Facility:     [facility] [facility]  [address] [address]   [Inpatient]   Referring/PCP: Tuyet Simms    Procedure:   Esophagogastroduodenoscopy --diagnostic  Date:     11/27/2021   Endoscopist:  Jose D Marshall DO     Preoperative Diagnosis:   Melena    Postoperative Diagnosis:  Gastric ulcer    Anesthesia:  See ICU meds  Estimated blood loss: Minimal    Complications: None    Description of Procedure:  Informed consent was obtained from the patient's family  after explanation of the procedure including indications, description of the procedure,  benefits and possible risks and complications of the procedure, and alternatives. Questions were answered. The patient's history was reviewed and a directed physical examination was performed prior to the procedure. Patient was monitored throughout the procedure with pulse oximetry and periodic assessment of vital signs. Patient was sedated as noted above. The Nursing staff and I performed a time out. With the patient in the left lateral decubitus position, the Olympus videoendoscope was placed in the patient's mouth and under direct visualization passed into the esophagus. The scope was ultimately passed to the second portion of the duodenum. Visualization was performed during both introduction and withdrawal of the endoscope and retroflexed view of the proximal stomach was obtained. Findings[de-identified]   Esophagus: normal. The findings do not support a diagnosis of Sharma's Esophagus. Stomach: The previous clip was still in place. An ulceration was seen at the site of clipping. Two endoclips were placed over the area.   Duodenum: normal    Recommendations:   Continue current management  Monitor for further bleeding    Jose D Marshall DO    [address]      [address] [facility] [address]     Phone: [phone]     Fax: [phone]          Electronically signed by Colletta Nova, DO on 11/27/2021 at 9:26 PM

## 2021-11-28 NOTE — PROGRESS NOTES
Hospitalist Progress Note      PCP: Levi Muhammad    Date of Admission: 11/23/2021            Chief [address] Course:   80 y.o. female with cirrhosis, etoh use who presented to [facility] with confusion/hepatic encephalopathy and GIB. Placed in ICU.      Subjective:  Remains sedated/intubated, on precedex, family/ not currently at bedside, had repeat egd yesterday     Medications:  Reviewed    Infusion Medications    dexmedetomidine HCl in NaCl 0.7 mcg/kg/hr (11/28/21 0604)    sodium chloride      norepinephrine 5 mcg/min (11/28/21 0605)    propofol Stopped (11/27/21 1515)    fentaNYL (SUBLIMAZE) infusion 75 mcg/hr (11/27/21 1939)    sodium chloride       Scheduled Medications    carboxymethylcellulose PF  1 drop Both Eyes 6 times per day    chlorhexidine  15 mL Mouth/Throat BID    pantoprazole  40 mg IntraVENous BID    mupirocin   Nasal BID    lactulose  20 g Oral 4 times per day    rifaximin  550 mg Oral BID    sodium chloride flush  5-40 mL IntraVENous 2 times per day    cefTRIAXone (ROCEPHIN) IV  1,000 mg IntraVENous Q24H     PRN Meds: sodium chloride, potassium chloride, sodium chloride, sodium chloride flush, ondansetron **OR** ondansetron, acetaminophen **OR** acetaminophen      Intake/Output Summary (Last 24 hours) at 11/28/2021 0949  Last data filed at 11/28/2021 0945  Gross per 24 hour   Intake 818.56 ml   Output 420 ml   Net 398.56 ml       Physical Exam Performed:    BP (!) 122/54   Pulse 74   Temp 96.6 °F (35.9 °C) (Bladder)   Resp 12   Ht 5' 5\" (1.651 m)   Wt 123 lb 7.3 oz (56 kg)   SpO2 100%   BMI 20.54 kg/m²       General appearance:  No apparent distress, appears stated age and cooperative. Intubated/sedated  HEENT:  Normal cephalic, atraumatic without obvious deformity. Pupils equal, round, and reactive to light.  Extra ocular muscles intact. Conjunctivae/corneas clear. NG in place   Neck: Supple, with full range of motion.  No jugular venous distention. Trachea midline. Respiratory:  improved respiratory effort. Clear to auscultation, bilaterally without Rales/Wheezes, some scattered Rhonchi anteriorly noted  Cardiovascular:  Regular rate and rhythm with normal S1/S2 without murmurs, rubs or gallops. Abdomen: Soft, non-tender, non-distended with normal bowel sounds. Rectal tube in place  Musculoskeletal:  No clubbing, cyanosis or edema bilaterally.  Full range of motion without deformity. Skin: Skin color, texture, turgor normal.  No rashes or lesions. Neurologic:  Neurovascularly intact without any focal sensory/motor deficits. Cranial nerves: II-XII intact, grossly non-focal.  Psychiatric:  Alert and oriented x0, sedated  Capillary Refill: Brisk,3 seconds, normal  Peripheral Pulses: +2 palpable, equal bilaterally       Labs:   Recent Labs     11/26/21  0600 11/26/21  1347 11/27/21  0510 11/27/21  0510 11/27/21  1703 11/27/21  2320 11/28/21  0740   WBC 7.8  --  7.8  --   --   --  9.7   HGB 7.6*   < > 9.7*   < > 10.6* 10.1* 10.3*   HCT 23.1*   < > 29.4*   < > 31.8* 30.5* 31.0*   PLT 87*  --  104*  --   --   --  106*    < > = values in this interval not displayed. Recent Labs     11/25/21  1730 11/25/21  1730 11/26/21  0600 11/26/21  1347 11/26/21  1740     --  145  --  147*   K 3.4*   < > 2.9* 3.6 3.7   *  --  120*  --  124*   CO2 20*  --  21  --  20*   BUN 31*  --  35*  --  34*   CREATININE <0.5*  --  <0.5*  --  <0.5*   CALCIUM 7.7*  --  7.8*  --  7.7*    < > = values in this interval not displayed. Recent Labs     11/25/21  1730 11/26/21  0600 11/26/21  1740   AST 54* 44* 48*   ALT 29 27 27   BILITOT 4.0* 3.6* 3.4*   ALKPHOS 84 67 71     Recent Labs     11/25/21  1000   INR 2.33*     No results for input(s): CKTOTAL, TROPONINI in the last 72 hours.     Urinalysis:      Lab Results   Component Value Date    NITRU Negative 11/23/2021    BLOODU Negative 11/23/2021    SPECGRAV 1.015 11/23/2021    GLUCOSEU Negative 11/23/2021 Radiology:  XR ABDOMEN FOR NG/OG/NE TUBE PLACEMENT   Final Result   NG tube is in the stomach. XR CHEST PORTABLE   Final Result   Line placement without complicating feature. XR CHEST PORTABLE   Final Result   1. The endotracheal tube tip is 3 cm above the maria g. 2. Clear lungs. XR ABDOMEN (KUB) (SINGLE AP VIEW)   Final Result   Enteric tube side port projects over the expected region of the GE junction,   recommend advancement. XR ABDOMEN (KUB) (SINGLE AP VIEW)   Final Result   Enteric tube tip and side port project over the stomach. CT ABDOMEN PELVIS W IV CONTRAST Additional Contrast? None   Final Result   1. Cirrhosis with evidence of portal hypertension including multiple upper   varices including distal esophageal and gastric varices as well as a   spontaneous splenorenal shunt. Mild third-spacing with edematous changes   throughout the abdomen and subcutaneous soft tissues. 2. Cholelithiasis with no acute features. 3. Distention with mural thickening in the duodenal sweep and proximal small   bowel. There is no evidence of obstruction. Findings may be related to a   duodenitis and enteritis. 4. Mural thickening of the distal esophagus with evidence of reflux, likely a   reflux esophagitis. CT HEAD WO CONTRAST   Final Result   Atrophy and mild small vessel ischemic disease. XR CHEST PORTABLE   Final Result   No radiographic evidence of acute pulmonary disease.                  Assessment/Plan:    Active Hospital Problems    Diagnosis     Hepatic encephalopathy ([facility] [address] [K72.90]          Hepatic encephalopathy- with ammonia of 176 on arrival, CThead was unremarkable for bleed  -GI consulted,  -NG placed in ER, lactulose was ordered  -ppi ggt  -was on octreotide ggt  -ICU care     Possible Resp failure- related to above,   -intubated 11/24 pm to secure airway given encephalopathy and concern for aspiration  -apprec pulm/cc mgmt  -CVC placed 11/25     Acute blood loss Anemia- with concern for coffee ground emesis and upper GIB(?variceal)  -trended h/h  -Gi on board, s/p EGD 11/24(noted nonbleeding gastric dieulafoy lesion, endoclipped)  -repeat EGD 11/27(2 endoclips placed at ulceration near site of initial clipping)  -was on ppi ggt  -2u prbc ordered in ER  -tele  -Iv rocephin started 11/23, end after 7 doses   -2u prbc 11/25       Lactic acidosis-resolved, concern related to above anemia, CTa/p without obvious pathology  -trended labs  -ivf bolus given     HTN- per emr,not on meds  -monitor VS     Transaminitis/Cirrhosis/coagulopathy- sees Margarita Varners, apparently was taking rifaximin per emr  -started lactulose via NG on 11/24   -vit K iv ordered 11/25     DVT Prophylaxis: scd  Diet: ADULT TUBE FEEDING; Nasogastric; Peptide Based; Continuous; 20; Yes; 10; Q 4 hours; 60; 100; Q 4 hours  Code Status: Full Code    PT/OT Eval Status: not possible      Dispo - icu care,  attempt weaning of vent    Geovany Ordaz MD

## 2021-11-28 NOTE — PROGRESS NOTES
11/28/21 0425   Vent Information   Vent Type 980   Vent Mode AC/VC+   Vt Ordered 450 mL   Rate Set 12 bmp   Pressure Support 0 cmH20   FiO2  30 %   Sensitivity 3   PEEP/CPAP 5   I Time/ I Time % 1.2 s   Humidification Source HME   Vent Patient Data   Peak Inspiratory Pressure 19 cmH2O   Mean Airway Pressure 8.5 cmH20   Rate Measured 12 br/min   Vt Exhaled 483 mL   Minute Volume 5.91 Liters   I:E Ratio 1:2.80   Spontaneous Breathing Trial (SBT) RT Doc   Pulse 82   Breath Sounds   Right Upper Lobe Diminished   Right Middle Lobe Diminished   Right Lower Lobe Diminished   Left Upper Lobe Diminished   Left Lower Lobe Diminished   Alarm Settings   High Pressure Alarm 40 cmH2O   Low Minute Volume Alarm 3 L/min   High Respiratory Rate 40 br/min   Low Exhaled Vt  200 mL   ETT (adult)   Placement Date/Time: 11/24/21 1845   Preoxygenation: Yes  Mask Ventilation: Ventilated by mask (1); Ventilated by mask with oral airway (2)  Technique: Direct laryngoscopy  Type: Cuffed  Tube Size: 8 mm  Laryngoscope: GlideScope  Blade Size: 3  Locati. ..    Secured at 25 cm   Measured From Lips   ET Placement Right   Secured By Commercial tube frankel   Site Condition Dry   Cuff Pressure 32 cm H2O

## 2021-11-28 NOTE — PROGRESS NOTES
Temp remains low. Warm blankets applied. cathflow to IJ, good blood return now. SCDs placed as ordered. Still has a lot out of rectal tube. Precedex and Levo gtts hanging.

## 2021-11-28 NOTE — PROGRESS NOTES
SBT terminated at this time due to the patient going apneic x4. RN notified.      Electronically signed by Jadiel Hernández RCP, RRT, RRT-ACCS on 11/28/2021 at 1:14 PM        11/28/21 1312   Vent Information   Vent Type 980   Vent Mode AC/VC+   Vt Ordered 450 mL   Rate Set 12 bmp   Pressure Support 0 cmH20   FiO2  30 %   SpO2 100 %   SpO2/FiO2 ratio 333.33   Sensitivity 3   PEEP/CPAP 5   I Time/ I Time % 1 s   Vent Patient Data   Peak Inspiratory Pressure 14 cmH2O   Mean Airway Pressure 9.4 cmH20   Rate Measured 16 br/min   Vt Exhaled 357 mL   Minute Volume 7.26 Liters   I:E Ratio 1:3.20   Spontaneous Breathing Trial (SBT) RT Doc   Pulse 64   Alarm Settings   High Pressure Alarm 40 cmH2O   Low Minute Volume Alarm 3 L/min   High Respiratory Rate 40 br/min

## 2021-11-28 NOTE — PROGRESS NOTES
This note also relates to the following rows which could not be included:  Vt Ordered - Cannot attach notes to unvalidated device data  Rate Set - Cannot attach notes to unvalidated device data  Pressure Support - Cannot attach notes to unvalidated device data  FiO2  - Cannot attach notes to unvalidated device data  SpO2 - Cannot attach notes to unvalidated device data  Sensitivity - Cannot attach notes to unvalidated device data  PEEP/CPAP - Cannot attach notes to unvalidated device data  I Time/ I Time % - Cannot attach notes to unvalidated device data  Peak Inspiratory Pressure - Cannot attach notes to unvalidated device data  Mean Airway Pressure - Cannot attach notes to unvalidated device data  Rate Measured - Cannot attach notes to unvalidated device data  Vt Exhaled - Cannot attach notes to unvalidated device data  Minute Volume - Cannot attach notes to unvalidated device data  I:E Ratio - Cannot attach notes to unvalidated device data  Pulse - Cannot attach notes to unvalidated device data  High Pressure Alarm - Cannot attach notes to unvalidated device data  Low Minute Volume Alarm - Cannot attach notes to unvalidated device data  High Respiratory Rate - Cannot attach notes to unvalidated device data       11/27/21 1958   Vent Information   Vent Type 980   Vent Mode AC/VC   Cough/Sputum   Sputum How Obtained Endotracheal   Cough Weak   Breath Sounds   Right Upper Lobe Diminished; Rhonchi   Right Middle Lobe Diminished; Rhonchi   Right Lower Lobe Diminished; Rhonchi   Left Upper Lobe Diminished; Rhonchi   Left Lower Lobe Diminished; Rhonchi   Alarm Settings   Low Exhaled Vt  200 mL   ETT (adult)   Placement Date/Time: 11/24/21 1845   Preoxygenation: Yes  Mask Ventilation: Ventilated by mask (1); Ventilated by mask with oral airway (2)  Technique: Direct laryngoscopy  Type: Cuffed  Tube Size: 8 mm  Laryngoscope: GlideScope  Blade Size: 3  Locati. ..   ET Placement Right

## 2021-11-29 PROBLEM — J96.01 ACUTE RESPIRATORY FAILURE WITH HYPOXIA (HCC): Status: ACTIVE | Noted: 2021-11-29

## 2021-11-29 PROBLEM — E87.0 HYPERNATREMIA: Status: ACTIVE | Noted: 2021-11-29

## 2021-11-29 PROBLEM — K25.0 ACUTE GASTRIC ULCER WITH HEMORRHAGE: Status: ACTIVE | Noted: 2021-11-29

## 2021-11-29 PROBLEM — K92.2 UPPER GI BLEEDING: Status: ACTIVE | Noted: 2021-11-29

## 2021-11-29 PROBLEM — K74.69 OTHER CIRRHOSIS OF LIVER (HCC): Status: ACTIVE | Noted: 2021-11-29

## 2021-11-29 PROBLEM — D62 ACUTE BLOOD LOSS ANEMIA: Status: ACTIVE | Noted: 2021-11-29

## 2021-11-29 PROBLEM — R57.9 SHOCK CIRCULATORY (HCC): Status: ACTIVE | Noted: 2021-11-29

## 2021-11-29 PROBLEM — K76.6 PORTAL HYPERTENSION (HCC): Status: ACTIVE | Noted: 2021-11-29

## 2021-11-29 LAB
AMMONIA: 44 UMOL/L (ref 11–51)
ANION GAP SERPL CALCULATED.3IONS-SCNC: 4 MMOL/L (ref 3–16)
BASE EXCESS ARTERIAL: -1 (ref -3–3)
BASOPHILS ABSOLUTE: 0.1 K/UL (ref 0–0.2)
BASOPHILS RELATIVE PERCENT: 0.7 %
BUN BLDV-MCNC: 20 MG/DL (ref 7–20)
CALCIUM SERPL-MCNC: 7.8 MG/DL (ref 8.3–10.6)
CHLORIDE BLD-SCNC: 120 MMOL/L (ref 99–110)
CO2: 24 MMOL/L (ref 21–32)
CREAT SERPL-MCNC: <0.5 MG/DL (ref 0.6–1.2)
EOSINOPHILS ABSOLUTE: 0.3 K/UL (ref 0–0.6)
EOSINOPHILS RELATIVE PERCENT: 2.9 %
GFR AFRICAN AMERICAN: >60
GFR NON-AFRICAN AMERICAN: >60
GLUCOSE BLD-MCNC: 136 MG/DL (ref 70–99)
HCO3 ARTERIAL: 23.4 MMOL/L (ref 21–29)
HCT VFR BLD CALC: 29.1 % (ref 36–48)
HEMOGLOBIN: 9.7 G/DL (ref 12–16)
LYMPHOCYTES ABSOLUTE: 1 K/UL (ref 1–5.1)
LYMPHOCYTES RELATIVE PERCENT: 10.5 %
MCH RBC QN AUTO: 28.4 PG (ref 26–34)
MCHC RBC AUTO-ENTMCNC: 33.3 G/DL (ref 31–36)
MCV RBC AUTO: 85.4 FL (ref 80–100)
MONOCYTES ABSOLUTE: 1 K/UL (ref 0–1.3)
MONOCYTES RELATIVE PERCENT: 10.8 %
NEUTROPHILS ABSOLUTE: 6.9 K/UL (ref 1.7–7.7)
NEUTROPHILS RELATIVE PERCENT: 75.1 %
O2 SAT, ARTERIAL: 97 % (ref 93–100)
PCO2 ARTERIAL: 36.1 MM HG (ref 35–45)
PDW BLD-RTO: 19.1 % (ref 12.4–15.4)
PERFORMED ON: NORMAL
PH ARTERIAL: 7.42 (ref 7.35–7.45)
PLATELET # BLD: 101 K/UL (ref 135–450)
PMV BLD AUTO: 7.6 FL (ref 5–10.5)
PO2 ARTERIAL: 92.2 MM HG (ref 75–108)
POC SAMPLE TYPE: NORMAL
POTASSIUM SERPL-SCNC: 3.4 MMOL/L (ref 3.5–5.1)
RBC # BLD: 3.4 M/UL (ref 4–5.2)
SODIUM BLD-SCNC: 148 MMOL/L (ref 136–145)
TCO2 ARTERIAL: 25 MMOL/L
WBC # BLD: 9.2 K/UL (ref 4–11)

## 2021-11-29 PROCEDURE — 2000000000 HC ICU R&B

## 2021-11-29 PROCEDURE — C9113 INJ PANTOPRAZOLE SODIUM, VIA: HCPCS | Performed by: INTERNAL MEDICINE

## 2021-11-29 PROCEDURE — 2580000003 HC RX 258: Performed by: INTERNAL MEDICINE

## 2021-11-29 PROCEDURE — 6370000000 HC RX 637 (ALT 250 FOR IP): Performed by: INTERNAL MEDICINE

## 2021-11-29 PROCEDURE — 6360000002 HC RX W HCPCS: Performed by: INTERNAL MEDICINE

## 2021-11-29 PROCEDURE — 2700000000 HC OXYGEN THERAPY PER DAY

## 2021-11-29 PROCEDURE — 80048 BASIC METABOLIC PNL TOTAL CA: CPT

## 2021-11-29 PROCEDURE — 2500000003 HC RX 250 WO HCPCS: Performed by: INTERNAL MEDICINE

## 2021-11-29 PROCEDURE — 82803 BLOOD GASES ANY COMBINATION: CPT

## 2021-11-29 PROCEDURE — 94003 VENT MGMT INPAT SUBQ DAY: CPT

## 2021-11-29 PROCEDURE — 6370000000 HC RX 637 (ALT 250 FOR IP): Performed by: NURSE PRACTITIONER

## 2021-11-29 PROCEDURE — 82140 ASSAY OF AMMONIA: CPT

## 2021-11-29 PROCEDURE — 85025 COMPLETE CBC W/AUTO DIFF WBC: CPT

## 2021-11-29 PROCEDURE — 99291 CRITICAL CARE FIRST HOUR: CPT | Performed by: INTERNAL MEDICINE

## 2021-11-29 PROCEDURE — 94761 N-INVAS EAR/PLS OXIMETRY MLT: CPT

## 2021-11-29 PROCEDURE — 6370000000 HC RX 637 (ALT 250 FOR IP)

## 2021-11-29 RX ORDER — MIDODRINE HYDROCHLORIDE 5 MG/1
5 TABLET ORAL
Status: DISCONTINUED | OUTPATIENT
Start: 2021-11-29 | End: 2021-12-04 | Stop reason: HOSPADM

## 2021-11-29 RX ADMIN — Medication 20 G: at 18:48

## 2021-11-29 RX ADMIN — CARBOXYMETHYLCELLULOSE SODIUM 1 DROP: 10 GEL OPHTHALMIC at 00:30

## 2021-11-29 RX ADMIN — SODIUM CHLORIDE, PRESERVATIVE FREE 10 ML: 5 INJECTION INTRAVENOUS at 19:41

## 2021-11-29 RX ADMIN — RIFAXIMIN 550 MG: 550 TABLET ORAL at 19:42

## 2021-11-29 RX ADMIN — CEFTRIAXONE SODIUM 1000 MG: 1 INJECTION, POWDER, FOR SOLUTION INTRAMUSCULAR; INTRAVENOUS at 18:48

## 2021-11-29 RX ADMIN — Medication: at 08:21

## 2021-11-29 RX ADMIN — Medication 20 G: at 00:40

## 2021-11-29 RX ADMIN — Medication 20 G: at 05:18

## 2021-11-29 RX ADMIN — Medication 0.7 MCG/KG/HR: at 04:28

## 2021-11-29 RX ADMIN — CARBOXYMETHYLCELLULOSE SODIUM 1 DROP: 10 GEL OPHTHALMIC at 04:27

## 2021-11-29 RX ADMIN — CARBOXYMETHYLCELLULOSE SODIUM 1 DROP: 10 GEL OPHTHALMIC at 08:05

## 2021-11-29 RX ADMIN — MIDODRINE HYDROCHLORIDE 5 MG: 5 TABLET ORAL at 18:48

## 2021-11-29 RX ADMIN — PANTOPRAZOLE SODIUM 40 MG: 40 INJECTION, POWDER, FOR SOLUTION INTRAVENOUS at 19:42

## 2021-11-29 RX ADMIN — Medication 20 G: at 12:14

## 2021-11-29 RX ADMIN — SODIUM CHLORIDE, PRESERVATIVE FREE 20 ML: 5 INJECTION INTRAVENOUS at 08:04

## 2021-11-29 RX ADMIN — PANTOPRAZOLE SODIUM 40 MG: 40 INJECTION, POWDER, FOR SOLUTION INTRAVENOUS at 08:05

## 2021-11-29 RX ADMIN — Medication 15 ML: at 08:05

## 2021-11-29 RX ADMIN — RIFAXIMIN 550 MG: 550 TABLET ORAL at 08:05

## 2021-11-29 ASSESSMENT — PULMONARY FUNCTION TESTS
PIF_VALUE: 19
PIF_VALUE: 15
PIF_VALUE: 16
PIF_VALUE: 15
PIF_VALUE: 12
PIF_VALUE: 16
PIF_VALUE: 16
PIF_VALUE: 15
PIF_VALUE: 14
PIF_VALUE: 15
PIF_VALUE: 15
PIF_VALUE: 17
PIF_VALUE: 15
PIF_VALUE: 14
PIF_VALUE: 17
PIF_VALUE: 15
PIF_VALUE: 15
PIF_VALUE: 17
PIF_VALUE: 14

## 2021-11-29 ASSESSMENT — PAIN SCALES - GENERAL: PAINLEVEL_OUTOF10: 0

## 2021-11-29 NOTE — PROGRESS NOTES
Progress Note  Date:2021       [id number]  Patient Ray Blackman     YOB: 1950     Age:71 y.o. Subjective    Subjective:  Symptoms:  Stable. Pain:  She reports no pain. Review of Systems  Objective         Vitals Last 24 Hours:  TEMPERATURE:  Temp  Av.9 °F (36.6 °C)  Min: 97.1 °F (36.2 °C)  Max: 98.5 °F (36.9 °C)  RESPIRATIONS RANGE: Resp  Av.3  Min: 14  Max: 18  PULSE OXIMETRY RANGE: SpO2  Av.1 %  Min: 81 %  Max: 100 %  PULSE RANGE: Pulse  Av.6  Min: 68  Max: 92  BLOOD PRESSURE RANGE: Systolic (96QYH), QCM:792 , Min:102 , NICK:992   ; Diastolic (06HTJ), RFU:42, Min:44, Max:57    I/O (24Hr): Intake/Output Summary (Last 24 hours) at 2021 1833  Last data filed at 2021 1200  Gross per 24 hour   Intake 1041.5 ml   Output 415 ml   Net 626.5 ml     Objective:  General Appearance: In no acute distress and not in pain. Vital signs: (most recent): Blood pressure (!) 115/44, pulse 87, temperature 97.9 °F (36.6 °C), temperature source Bladder, resp. rate 16, height 5' 5\" (1.651 m), weight 122 lb 2.2 oz (55.4 kg), SpO2 100 %. Abdomen: Abdomen is soft. Bowel sounds are normal.   There is no abdominal tenderness. Labs/Imaging/Diagnostics    Labs:  CBC:  Recent Labs     21  0510 21  1703 21  2320 21  0740 21  0515   WBC 7.8  --   --  9.7 9.2   RBC 3.50*  --   --  3.59* 3.40*   HGB 9.7*   < > 10.1* 10.3* 9.7*   HCT 29.4*   < > 30.5* 31.0* 29.1*   MCV 84.0  --   --  86.3 85.4   RDW 18.3*  --   --  18.9* 19.1*   *  --   --  106* 101*    < > = values in this interval not displayed. CHEMISTRIES:  Recent Labs     21  1015 21  0850   * 148*   K 3.3* 3.4*   * 120*   CO2 21 24   BUN 26* 20   CREATININE <0.5* <0.5*   GLUCOSE 142* 136*     PT/INR:No results for input(s): PROTIME, INR in the last 72 hours. APTT:No results for input(s): APTT in the last 72 hours.   LIVER PROFILE:No results for input(s): AST, ALT, BILIDIR, BILITOT, ALKPHOS in the last 72 hours. Imaging Last 24 Hours:  No results found. Assessment//Plan           Hospital Problems           Last Modified POA    Hepatic encephalopathy ([address] 11/23/2021 Yes    Acute respiratory failure with hypoxia ([address] 11/29/2021 Yes    Upper GI bleeding 11/29/2021 Yes    Acute gastric ulcer with hemorrhage 11/29/2021 Yes    Acute blood loss anemia 11/29/2021 Yes    Shock circulatory ([address] 11/29/2021 Yes    Other cirrhosis of liver ([address] 11/29/2021 Yes    Portal hypertension ([address] 11/29/2021 Yes    Hypernatremia 11/29/2021 Yes        Assessment & Plan  73-year-old female with history of alcoholic cirrhosis admitted with encephalopathy and GI bleed. EGD showed no varices but Deulifouy lesion in proximal stomach which was clipped. H/H is stable, no D/C per NGT and has brown stools. Is getting weaned.     Plan:   1. Supportive care  2. Continue Protonix  3. F/U H/H and transfuse as appropriate  4.  Will follow      Cari Scales MD       ([phone]) [phone]    Electronically signed by Cari Scales MD on 11/29/21 at 6:33 PM EST

## 2021-11-29 NOTE — PROGRESS NOTES
Hospitalist Progress Note      PCP: Ellen Gee    Date of Admission: 11/23/2021     Chief [address] Course:   80 y.o. female with cirrhosis, etoh use who presented to [facility] with confusion/hepatic encephalopathy and GIB. Placed in ICU.      Subjective:  now extubated this AM, , family/  currently at bedside,    Medications:  Reviewed    Infusion Medications    dexmedetomidine HCl in NaCl 0.3 mcg/kg/hr (11/29/21 1002)    sodium chloride      norepinephrine 8 mcg/min (11/29/21 0934)    sodium chloride       Scheduled Medications    pantoprazole  40 mg IntraVENous BID    lactulose  20 g Oral 4 times per day    rifaximin  550 mg Oral BID    sodium chloride flush  5-40 mL IntraVENous 2 times per day    cefTRIAXone (ROCEPHIN) IV  1,000 mg IntraVENous Q24H     PRN Meds: sodium chloride, potassium chloride, sodium chloride, sodium chloride flush, ondansetron **OR** ondansetron, acetaminophen **OR** acetaminophen      Intake/Output Summary (Last 24 hours) at 11/29/2021 1136  Last data filed at 11/29/2021 0900  Gross per 24 hour   Intake 1645.55 ml   Output 615 ml   Net 1030.55 ml       Physical Exam Performed:    BP (!) 108/47   Pulse 78   Temp 97.9 °F (36.6 °C) (Bladder)   Resp 16   Ht 5' 5\" (1.651 m)   Wt 122 lb 2.2 oz (55.4 kg)   SpO2 99%   BMI 20.32 kg/m²     General appearance: No apparent distress, appears stated age and cooperative. HEENT: Pupils equal, round, and reactive to light. Conjunctivae/corneas clear. Neck: Supple, with full range of motion. No jugular venous distention. Trachea midline. Respiratory:  improved respiratory effort. Clear to auscultation, bilaterally without Rales/Wheezes, some scattered Rhonchi anteriorly noted  Cardiovascular: Regular rate and rhythm with normal S1/S2 without murmurs, rubs or gallops. Abdomen: Soft, non-tender, non-distended with normal bowel sounds. Musculoskeletal: No clubbing, cyanosis or edema bilaterally. Full range of motion without deformity. Skin: Skin color, texture, turgor normal.  No rashes or lesions. Neurologic:  Neurovascularly intact without any focal sensory/motor deficits. Cranial nerves: II-XII intact, grossly non-focal.  Psychiatric: Alert and oriented, thought content appropriate, normal insight  Capillary Refill: Brisk,3 seconds, normal   Peripheral Pulses: +2 palpable, equal bilaterally       Labs:   Recent Labs     11/27/21  0510 11/27/21  1703 11/27/21  2320 11/28/21  0740 11/29/21  0515   WBC 7.8  --   --  9.7 9.2   HGB 9.7*   < > 10.1* 10.3* 9.7*   HCT 29.4*   < > 30.5* 31.0* 29.1*   *  --   --  106* 101*    < > = values in this interval not displayed. Recent Labs     11/26/21  1740 11/28/21  1015 11/29/21  0850   * 146* 148*   K 3.7 3.3* 3.4*   * 119* 120*   CO2 20* 21 24   BUN 34* 26* 20   CREATININE <0.5* <0.5* <0.5*   CALCIUM 7.7* 8.0* 7.8*     Recent Labs     11/26/21  1740   AST 48*   ALT 27   BILITOT 3.4*   ALKPHOS 71     No results for input(s): INR in the last 72 hours. No results for input(s): Carlos Manuel Perezed in the last 72 hours. Urinalysis:      Lab Results   Component Value Date    NITRU Negative 11/23/2021    BLOODU Negative 11/23/2021    SPECGRAV 1.015 11/23/2021    GLUCOSEU Negative 11/23/2021       Radiology:  XR ABDOMEN FOR NG/OG/NE TUBE PLACEMENT   Final Result   NG tube is in the stomach. XR CHEST PORTABLE   Final Result   Line placement without complicating feature. XR CHEST PORTABLE   Final Result   1. The endotracheal tube tip is 3 cm above the maria g. 2. Clear lungs. XR ABDOMEN (KUB) (SINGLE AP VIEW)   Final Result   Enteric tube side port projects over the expected region of the GE junction,   recommend advancement. XR ABDOMEN (KUB) (SINGLE AP VIEW)   Final Result   Enteric tube tip and side port project over the stomach. CT ABDOMEN PELVIS W IV CONTRAST Additional Contrast? None   Final Result   1. Cirrhosis with evidence of portal hypertension including multiple upper   varices including distal esophageal and gastric varices as well as a   spontaneous splenorenal shunt. Mild third-spacing with edematous changes   throughout the abdomen and subcutaneous soft tissues. 2. Cholelithiasis with no acute features. 3. Distention with mural thickening in the duodenal sweep and proximal small   bowel. There is no evidence of obstruction. Findings may be related to a   duodenitis and enteritis. 4. Mural thickening of the distal esophagus with evidence of reflux, likely a   reflux esophagitis. CT HEAD WO CONTRAST   Final Result   Atrophy and mild small vessel ischemic disease. XR CHEST PORTABLE   Final Result   No radiographic evidence of acute pulmonary disease.                  Assessment/Plan:    Active Hospital Problems    Diagnosis     Hepatic encephalopathy ([address] [K72.90]            Hepatic encephalopathy- with ammonia of 176 on arrival, CThead was unremarkable for bleed  -GI consulted,  -NG placed in ER, lactulose was ordered  -ppi ggt  -was on octreotide ggt  -ICU care     Possible Resp failure- related to above,   -intubated 11/24 pm to secure airway given encephalopathy and concern for aspiration, extubated 11/29  -apprec pulm/cc mgmt  -CVC placed 11/25     Acute blood loss Anemia- with concern for coffee ground emesis and upper GIB(?variceal)  -trended h/h  -Gi on board, s/p EGD 11/24(noted nonbleeding gastric dieulafoy lesion, endoclipped)  -repeat EGD 11/27(2 endoclips placed at ulceration near site of initial clipping)  -was on ppi ggt  -2u prbc ordered in ER  -tele  -Iv rocephin started 11/23, end after 7 doses   -2u prbc 11/25        Lactic acidosis-resolved, concern related to above anemia, CTa/p without obvious pathology  -trended labs  -ivf bolus given     HTN- per emr,not on meds  -monitor VS     Transaminitis/Cirrhosis/coagulopathy- sees Jay Vora, apparently was taking rifaximin per emr  -started lactulose via NG on 11/24   -vit K iv ordered 11/25     DVT Prophylaxis: scd  Diet: ADULT TUBE FEEDING; Nasogastric; Peptide Based; Continuous; 20; Yes; 10; Q 4 hours; 60; 100; Q 4 hours  Code Status: Full Code      PT/OT Eval Status: not yet possible        Dispo - icu care, stop abx 11/30    Zaid Cody MD

## 2021-11-29 NOTE — PROGRESS NOTES
11/28/21 1916   Vent Information   Vent Type 980   Vent Mode AC/VC+   Vt Ordered 450 mL   Rate Set 12 bmp   Pressure Support 0 cmH20   FiO2  30 %   SpO2 98 %   SpO2/FiO2 ratio 326.67   Sensitivity 3   PEEP/CPAP 5   I Time/ I Time % 1.2 s   Humidification Source HME   Vent Patient Data   Peak Inspiratory Pressure 17 cmH2O   Mean Airway Pressure 8.9 cmH20   Rate Measured 14 br/min   Vt Exhaled 499 mL   Minute Volume 6.22 Liters   I:E Ratio 1:3.20   Spontaneous Breathing Trial (SBT) RT Doc   Pulse 69   Breath Sounds   Right Upper Lobe Diminished   Right Middle Lobe Diminished   Right Lower Lobe Diminished   Left Upper Lobe Diminished   Left Lower Lobe Diminished   Alarm Settings   High Pressure Alarm 40 cmH2O   Low Minute Volume Alarm 3 L/min   High Respiratory Rate 40 br/min   Low Exhaled Vt  300 mL   ETT (adult)   Placement Date/Time: 11/24/21 1845   Preoxygenation: Yes  Mask Ventilation: Ventilated by mask (1); Ventilated by mask with oral airway (2)  Technique: Direct laryngoscopy  Type: Cuffed  Tube Size: 8 mm  Laryngoscope: GlideScope  Blade Size: 3  Locati. ..    Secured at 25 cm   Measured From Lips   ET Placement Left   Secured By Commercial tube frankel   Site Condition Dry   Cuff Pressure 30 cm H2O

## 2021-11-29 NOTE — PROGRESS NOTES
10.3*   HCT 23.1*   < > 29.4*   < > 31.8* 30.5* 31.0*   MCV 83.3  --  84.0  --   --   --  86.3   PLT 87*  --  104*  --   --   --  106*    < > = values in this interval not displayed. BMP:   Recent Labs     11/26/21  0600 11/26/21  1347 11/26/21  1740 11/28/21  1015     --  147* 146*   K 2.9* 3.6 3.7 3.3*   *  --  124* 119*   CO2 21  --  20* 21   BUN 35*  --  34* 26*   CREATININE <0.5*  --  <0.5* <0.5*     LIVER PROFILE:   Recent Labs     11/26/21  0600 11/26/21  1740   AST 44* 48*   ALT 27 27   PROT 4.5* 4.7*   BILITOT 3.6* 3.4*   ALKPHOS 67 71     PT/INR: No results for input(s): INR in the last 72 hours. Invalid input(s): PT    IMAGING:  XR ABDOMEN FOR NG/OG/NE TUBE PLACEMENT    Result Date: 11/27/2021  EXAMINATION: ONE SUPINE XRAY VIEW(S) OF THE ABDOMEN 11/27/2021 3:30 pm COMPARISON: 11/24/2021 HISTORY: ORDERING SYSTEM PROVIDED HISTORY: Confirmation of course of NG/OG/NE tube and location of tip of tube TECHNOLOGIST PROVIDED HISTORY: Reason for exam:->Confirmation of course of NG/OG/NE tube and location of tip of tube Portable? ->Yes FINDINGS: Lung bases appear clear. Calcified breast implants are noted bilaterally. There is a nasogastric catheter with its tip in the gastric antrum, proximal side port is in the gastric body. NG tube is in good position. Mildly gas filled loops of transverse colon and sigmoid colon are present in the central abdomen. No free air. No organomegaly. NG tube is in the stomach. Hospital Problems           Last Modified POA    Hepatic encephalopathy ([facility] 11/23/2021 Yes         Impression:  81 yo female with hepatic encephalopathy and acute blood loss anemia. EGD demonstrated gastric Dieulafoy s/p endoclip    Recommendation:  1. Continue to monitor.   Dr. Serenity Urbina to evaluate tomorrfarida Cortes DO  11:05 PM 11/28/2021            [facility]    [address]      [address] [facility] [place]     Phone: [phone]     Fax: [phone]

## 2021-11-29 NOTE — PROGRESS NOTES
Pulmonary & Critical Care Medicine ICU Progress Note    Admit Date: 2021  PCP: Helene Kayser    CC:  Respiratory failure   Events of Last 24 hours:    Patient when seen this morning continues to be critically ill on mechanical vent support, patient has modest secretion in the endotracheal tube, patient was on 30% oxygen on the ventilator to mid oxygen saturation, patient continues to be on IV Levophed to maintain hemodynamics, patient's requirement for norepinephrine has gone up overnight, patient was afebrile, patient has sinus rhythm on the monitor, patient was on IV Precedex to maintain patient ventilator synchrony, patient has low urine output as documented in epic with cumulative fluid balance of +4.3 L, patient's blood sugars were slightly on the higher side but acceptable, no other pertinent review of system of concern  Vitals:  Tmax:  VITALS:  BP (!) 108/47   Pulse 78   Temp 97.9 °F (36.6 °C) (Bladder)   Resp 16   Ht 5' 5\" (1.651 m)   Wt 122 lb 2.2 oz (55.4 kg)   SpO2 99%   BMI 20.32 kg/m²   24HR INTAKE/OUTPUT:    CURRENT PULSE OXIMETRY:  SpO2: 99 %  24HR PULSE OXIMETRY RANGE:  SpO2  Av %  Min: 81 %  Max: 100 %      Vent Settings:  Vent Mode: AC/VC+ Rate Set: 12 bmp/Vt Ordered: 450 mL/ /FiO2 : 30 %  PEEP5  Recent Labs     21  0750 21  1105   PHART 7.378 7.419   MAR2FXP 34.4* 36.1   PO2ART 109.8* 92.2         EXAM:  General: No distress on mechanical body support  Eyes: PERRL. No sclera icterus. No conjunctival injection. ENT: ETT in place, no facial asymmetry or thyroid enlargement  Neck: Trachea midline. Normal thyroid. Resp: No significant wheezing, scattered basilar crackles with decreased breath sound intensity  CV: Regular rate. Regular rhythm. No mumur or rub. +1 edema   GI: Non-tender. Non-distended. No masses. No organmegaly. Normal bowel sounds. Skin: Warm and dry. No nodule on exposed extremities. No rash on exposed extremities.   Neuro:Intubated, sedated IV:   dexmedetomidine HCl in NaCl 0.3 mcg/kg/hr (11/29/21 1002)    sodium chloride      norepinephrine 8 mcg/min (11/29/21 0934)    sodium chloride           Scheduled Meds:     pantoprazole  40 mg IntraVENous BID    lactulose  20 g Oral 4 times per day    rifaximin  550 mg Oral BID    sodium chloride flush  5-40 mL IntraVENous 2 times per day    cefTRIAXone (ROCEPHIN) IV  1,000 mg IntraVENous Q24H         Diet: ADULT TUBE FEEDING; Nasogastric; Peptide Based; Continuous; 20; Yes; 10; Q 4 hours; 60; 100; Q 4 hours     Results:  CBC:   Recent Labs     11/27/21  0510 11/27/21  1703 11/27/21  2320 11/28/21  0740 11/29/21  0515   WBC 7.8  --   --  9.7 9.2   HGB 9.7*   < > 10.1* 10.3* 9.7*   HCT 29.4*   < > 30.5* 31.0* 29.1*   MCV 84.0  --   --  86.3 85.4   *  --   --  106* 101*    < > = values in this interval not displayed. BMP:   Recent Labs     11/26/21  1740 11/28/21  1015 11/29/21  0850   * 146* 148*   K 3.7 3.3* 3.4*   * 119* 120*   CO2 20* 21 24   BUN 34* 26* 20   CREATININE <0.5* <0.5* <0.5*     LIVER PROFILE:   Recent Labs     11/26/21  1740   AST 48*   ALT 27   BILITOT 3.4*   ALKPHOS 71     CT abdomen/plevis -  1. Cirrhosis with evidence of portal hypertension including multiple upper   varices including distal esophageal and gastric varices as well as a   spontaneous splenorenal shunt.  Mild third-spacing with edematous changes   throughout the abdomen and subcutaneous soft tissues. 2. Cholelithiasis with no acute features. 3. Distention with mural thickening in the duodenal sweep and proximal small   bowel. Deepika Harper is no evidence of obstruction.  Findings may be related to a   duodenitis and enteritis. 4. Mural thickening of the distal esophagus with evidence of reflux, likely a   reflux esophagitis.      ONE XRAY VIEW OF THE CHEST       11/25/2021 6:37 am       COMPARISON:   11/24/2021       HISTORY:   ORDERING SYSTEM PROVIDED HISTORY: CVC placement   TECHNOLOGIST PROVIDED HISTORY:   Reason for exam:->CVC placement   Reason for Exam: CVC placement   Acuity: Acute   Type of Exam: Initial       FINDINGS:   ETT and NG tube remain in satisfactory position.  A right IJ catheter is been   placed with tip over the lower SVC.  There is no pneumothorax, consolidation   or effusion.  Heart size and vascularity are stable. Upper GI endoscopy findings -Findings[de-identified]   Esophagus: normal. The findings do not support a diagnosis of Sharma's Esophagus. Stomach: The previous clip was still in place. An ulceration was seen at the site of clipping. Two endoclips were placed over the area. Duodenum: normal    Results for Tiny Ripper" (MRN [id number]) as of 11/29/2021 11:38   Ref. Range 11/25/2021 07:00 11/26/2021 06:30 11/27/2021 05:10 11/28/2021 07:40 11/29/2021 05:15   Ammonia Latest Ref Range: 11 - 51 umol/L 75 (H) 52 (H) 41 50 44     Assessment/Plan:      Active Problems:    Hepatic encephalopathy (HCC)    Acute respiratory failure with hypoxia (HCC)    Upper GI bleeding    Acute gastric ulcer with hemorrhage    Acute blood loss anemia    Shock circulatory (HCC)    Other cirrhosis of liver (HCC)    Portal hypertension (HCC)    Hypernatremia  Resolved Problems:    * No resolved hospital problems.  *      Ventilatory support to keep saturation between 90-94% only  Ventilator settings and waveforms reviewed  Ventilator changes made  Pulmonary toilet  IV sedation to maintain patient ventilator synchrony  Titration of sedation as per RASS scores  IV sedation to be tapered to give a trial of SBT  Status post upper GI endoscopy NG tube with clipping  Status post packed RBC transfusion  Monitor H&H and platelets closely  Patient likely cirrhosis has improved  IV pressors to keep mean arterial pressure more 65 mmHg  Patient has been starting empirically on IV Rocephin-we will reassess about discontinuation  Lactulose and rifaximin to continue  Monitor input output and BMP  Correct

## 2021-11-29 NOTE — PROGRESS NOTES
11/29/21 0747   Vent Information   Skin Assessment Clean, dry, & intact   Vt Ordered 450 mL   Rate Set 12 bmp   Pressure Support 0 cmH20   FiO2  30 %   SpO2 99 %   SpO2/FiO2 ratio 330   Sensitivity 3   PEEP/CPAP 5   I Time/ I Time % 1 s   Humidification Source HME   Vent Patient Data   Peak Inspiratory Pressure 12 cmH2O   Mean Airway Pressure 7.7 cmH20   Rate Measured 14 br/min   Vt Exhaled 670 mL   Minute Volume 6.57 Liters   I:E Ratio 1:3.20   Spontaneous Breathing Trial (SBT) RT Doc   Pulse 77   Breath Sounds   Right Upper Lobe Diminished   Right Middle Lobe Diminished   Right Lower Lobe Diminished   Left Upper Lobe Diminished   Left Lower Lobe Diminished   Additional Respiratory  Assessments   Resp 16   Position Semi-Smallwood's   Cuff Pressure (cm H2O) 30 cm H2O   Alarm Settings   High Pressure Alarm 40 cmH2O   Low Minute Volume Alarm 3 L/min   High Respiratory Rate 40 br/min   Patient Observation   Observations AMBU @ BEDSIDE   ETT (adult)   Placement Date/Time: 11/24/21 1845   Preoxygenation: Yes  Mask Ventilation: Ventilated by mask (1); Ventilated by mask with oral airway (2)  Technique: Direct laryngoscopy  Type: Cuffed  Tube Size: 8 mm  Laryngoscope: GlideScope  Blade Size: 3  Locati. ..    Secured at 25 cm   Measured From [address] [place] [address],[address] By Commercial tube frankel   Site Condition Dry

## 2021-11-29 NOTE — PROGRESS NOTES
11/29/21 0336   Vent Information   Vent Type 980   Vent Mode AC/VC+   Vt Ordered 450 mL   Rate Set 12 bmp   Pressure Support 0 cmH20   FiO2  30 %   SpO2 99 %   SpO2/FiO2 ratio 330   Sensitivity 3   PEEP/CPAP 5   I Time/ I Time % 1.2 s   Humidification Source HME   Vent Patient Data   Peak Inspiratory Pressure 17 cmH2O   Mean Airway Pressure 8.4 cmH20   Rate Measured 13 br/min   Vt Exhaled 243 mL   Minute Volume 5.89 Liters   I:E Ratio 1.20:1   Cough/Sputum   Sputum How Obtained Endotracheal; Suctioned   Cough Productive   Sputum Amount Moderate   Sputum Color Creamy; Red   Tenacity Thick   Spontaneous Breathing Trial (SBT) RT Doc   Pulse 79   Breath Sounds   Right Upper Lobe Rhonchi   Right Middle Lobe Diminished   Right Lower Lobe Diminished   Left Upper Lobe Rhonchi   Left Lower Lobe Diminished   Alarm Settings   High Pressure Alarm 40 cmH2O   Low Minute Volume Alarm 3 L/min   High Respiratory Rate 40 br/min   Low Exhaled Vt  300 mL   ETT (adult)   Placement Date/Time: 11/24/21 1845   Preoxygenation: Yes  Mask Ventilation: Ventilated by mask (1); Ventilated by mask with oral airway (2)  Technique: Direct laryngoscopy  Type: Cuffed  Tube Size: 8 mm  Laryngoscope: GlideScope  Blade Size: 3  Locati. ..    Secured at 25 cm   Measured From Lips   ET Placement Right   Secured By Commercial tube frankel   Site Condition Dry   Cuff Pressure 30 cm H2O

## 2021-11-29 NOTE — PROGRESS NOTES
This note also relates to the following rows which could not be included:  Sensitivity - Cannot attach notes to unvalidated device data  Minute Volume - Cannot attach notes to unvalidated device data  High Respiratory Rate - Cannot attach notes to unvalidated device data       11/28/21 2314   Vent Information   Vent Type 980   Vent Mode AC/VC+   Vt Ordered 450 mL   Rate Set 12 bmp   Pressure Support 0 cmH20   FiO2  30 %   SpO2 100 %   SpO2/FiO2 ratio 333.33   PEEP/CPAP 5   I Time/ I Time % 1.2 s   Humidification Source HME   Vent Patient Data   Peak Inspiratory Pressure 17 cmH2O   Mean Airway Pressure 8.2 cmH20   Rate Measured 13 br/min   Vt Exhaled 460 mL   I:E Ratio 1:3.2   Spontaneous Breathing Trial (SBT) RT Doc   Pulse 70   Breath Sounds   Right Upper Lobe Diminished   Right Middle Lobe Diminished   Right Lower Lobe Diminished   Left Upper Lobe Diminished   Left Lower Lobe Diminished   Alarm Settings   High Pressure Alarm 40 cmH2O   Low Minute Volume Alarm 3 L/min   Low Exhaled Vt  300 mL   ETT (adult)   Placement Date/Time: 11/24/21 1845   Preoxygenation: Yes  Mask Ventilation: Ventilated by mask (1); Ventilated by mask with oral airway (2)  Technique: Direct laryngoscopy  Type: Cuffed  Tube Size: 8 mm  Laryngoscope: GlideScope  Blade Size: 3  Locati. ..    Secured at 25 cm   Measured From [address] [place] [address],[address] By [employer] tube frankel   Site Condition Dry   Cuff Pressure 28 cm H2O

## 2021-11-30 PROBLEM — E44.0 MODERATE PROTEIN-CALORIE MALNUTRITION (HCC): Status: ACTIVE | Noted: 2021-11-30

## 2021-11-30 LAB
ALBUMIN SERPL-MCNC: 2.2 G/DL (ref 3.4–5)
ANION GAP SERPL CALCULATED.3IONS-SCNC: 5 MMOL/L (ref 3–16)
ANION GAP SERPL CALCULATED.3IONS-SCNC: 6 MMOL/L (ref 3–16)
BASOPHILS ABSOLUTE: 0.1 K/UL (ref 0–0.2)
BASOPHILS RELATIVE PERCENT: 0.8 %
BUN BLDV-MCNC: 20 MG/DL (ref 7–20)
BUN BLDV-MCNC: 27 MG/DL (ref 7–20)
CALCIUM SERPL-MCNC: 7.4 MG/DL (ref 8.3–10.6)
CALCIUM SERPL-MCNC: 8 MG/DL (ref 8.3–10.6)
CHLORIDE BLD-SCNC: 118 MMOL/L (ref 99–110)
CHLORIDE BLD-SCNC: 119 MMOL/L (ref 99–110)
CO2: 25 MMOL/L (ref 21–32)
CO2: 26 MMOL/L (ref 21–32)
CREAT SERPL-MCNC: <0.5 MG/DL (ref 0.6–1.2)
CREAT SERPL-MCNC: <0.5 MG/DL (ref 0.6–1.2)
EOSINOPHILS ABSOLUTE: 0.2 K/UL (ref 0–0.6)
EOSINOPHILS RELATIVE PERCENT: 1.5 %
GFR AFRICAN AMERICAN: >60
GFR AFRICAN AMERICAN: >60
GFR NON-AFRICAN AMERICAN: >60
GFR NON-AFRICAN AMERICAN: >60
GLUCOSE BLD-MCNC: 107 MG/DL (ref 70–99)
GLUCOSE BLD-MCNC: 123 MG/DL (ref 70–99)
GLUCOSE BLD-MCNC: 146 MG/DL (ref 70–99)
HCT VFR BLD CALC: 26.2 % (ref 36–48)
HEMOGLOBIN: 8.7 G/DL (ref 12–16)
LYMPHOCYTES ABSOLUTE: 1.4 K/UL (ref 1–5.1)
LYMPHOCYTES RELATIVE PERCENT: 13.1 %
MAGNESIUM: 1.8 MG/DL (ref 1.8–2.4)
MCH RBC QN AUTO: 28.8 PG (ref 26–34)
MCHC RBC AUTO-ENTMCNC: 33.3 G/DL (ref 31–36)
MCV RBC AUTO: 86.4 FL (ref 80–100)
MONOCYTES ABSOLUTE: 1.3 K/UL (ref 0–1.3)
MONOCYTES RELATIVE PERCENT: 12.6 %
NEUTROPHILS ABSOLUTE: 7.4 K/UL (ref 1.7–7.7)
NEUTROPHILS RELATIVE PERCENT: 72 %
PDW BLD-RTO: 19.4 % (ref 12.4–15.4)
PERFORMED ON: ABNORMAL
PHOSPHORUS: 2.5 MG/DL (ref 2.5–4.9)
PLATELET # BLD: 101 K/UL (ref 135–450)
PMV BLD AUTO: 7.9 FL (ref 5–10.5)
POTASSIUM SERPL-SCNC: 3.1 MMOL/L (ref 3.5–5.1)
POTASSIUM SERPL-SCNC: 3.6 MMOL/L (ref 3.5–5.1)
POTASSIUM SERPL-SCNC: 3.7 MMOL/L (ref 3.5–5.1)
RBC # BLD: 3.04 M/UL (ref 4–5.2)
SODIUM BLD-SCNC: 149 MMOL/L (ref 136–145)
SODIUM BLD-SCNC: 150 MMOL/L (ref 136–145)
WBC # BLD: 10.4 K/UL (ref 4–11)

## 2021-11-30 PROCEDURE — 92610 EVALUATE SWALLOWING FUNCTION: CPT

## 2021-11-30 PROCEDURE — 2580000003 HC RX 258: Performed by: INTERNAL MEDICINE

## 2021-11-30 PROCEDURE — 1200000000 HC SEMI PRIVATE

## 2021-11-30 PROCEDURE — 97167 OT EVAL HIGH COMPLEX 60 MIN: CPT

## 2021-11-30 PROCEDURE — 6360000002 HC RX W HCPCS: Performed by: INTERNAL MEDICINE

## 2021-11-30 PROCEDURE — 97535 SELF CARE MNGMENT TRAINING: CPT

## 2021-11-30 PROCEDURE — 6370000000 HC RX 637 (ALT 250 FOR IP): Performed by: NURSE PRACTITIONER

## 2021-11-30 PROCEDURE — 85025 COMPLETE CBC W/AUTO DIFF WBC: CPT

## 2021-11-30 PROCEDURE — 6370000000 HC RX 637 (ALT 250 FOR IP): Performed by: INTERNAL MEDICINE

## 2021-11-30 PROCEDURE — 97163 PT EVAL HIGH COMPLEX 45 MIN: CPT

## 2021-11-30 PROCEDURE — 99291 CRITICAL CARE FIRST HOUR: CPT | Performed by: INTERNAL MEDICINE

## 2021-11-30 PROCEDURE — 36591 DRAW BLOOD OFF VENOUS DEVICE: CPT

## 2021-11-30 PROCEDURE — 92526 ORAL FUNCTION THERAPY: CPT

## 2021-11-30 PROCEDURE — C9113 INJ PANTOPRAZOLE SODIUM, VIA: HCPCS | Performed by: INTERNAL MEDICINE

## 2021-11-30 PROCEDURE — 84132 ASSAY OF SERUM POTASSIUM: CPT

## 2021-11-30 PROCEDURE — 80069 RENAL FUNCTION PANEL: CPT

## 2021-11-30 PROCEDURE — 36592 COLLECT BLOOD FROM PICC: CPT

## 2021-11-30 PROCEDURE — 2580000003 HC RX 258: Performed by: NURSE PRACTITIONER

## 2021-11-30 PROCEDURE — 83735 ASSAY OF MAGNESIUM: CPT

## 2021-11-30 PROCEDURE — 97530 THERAPEUTIC ACTIVITIES: CPT

## 2021-11-30 PROCEDURE — 2500000003 HC RX 250 WO HCPCS: Performed by: NURSE PRACTITIONER

## 2021-11-30 RX ORDER — LACTULOSE 10 G/15ML
20 SOLUTION ORAL 3 TIMES DAILY
Status: DISCONTINUED | OUTPATIENT
Start: 2021-11-30 | End: 2021-12-02

## 2021-11-30 RX ORDER — FOLIC ACID 1 MG/1
1 TABLET ORAL DAILY
Status: DISCONTINUED | OUTPATIENT
Start: 2021-11-30 | End: 2021-12-04 | Stop reason: HOSPADM

## 2021-11-30 RX ORDER — LANOLIN ALCOHOL/MO/W.PET/CERES
100 CREAM (GRAM) TOPICAL DAILY
Status: DISCONTINUED | OUTPATIENT
Start: 2021-11-30 | End: 2021-12-04 | Stop reason: HOSPADM

## 2021-11-30 RX ADMIN — POTASSIUM CHLORIDE 20 MEQ: 400 INJECTION, SOLUTION INTRAVENOUS at 05:57

## 2021-11-30 RX ADMIN — LACTULOSE 20 G: 20 SOLUTION ORAL at 20:47

## 2021-11-30 RX ADMIN — FOLIC ACID 1 MG: 1 TABLET ORAL at 17:46

## 2021-11-30 RX ADMIN — Medication 10 ML: at 21:03

## 2021-11-30 RX ADMIN — Medication 100 MG: at 17:46

## 2021-11-30 RX ADMIN — Medication 10 ML: at 20:48

## 2021-11-30 RX ADMIN — DEXTROSE MONOHYDRATE 8 MCG/MIN: 50 INJECTION, SOLUTION INTRAVENOUS at 00:21

## 2021-11-30 RX ADMIN — MIDODRINE HYDROCHLORIDE 5 MG: 5 TABLET ORAL at 11:40

## 2021-11-30 RX ADMIN — RIFAXIMIN 550 MG: 550 TABLET ORAL at 08:55

## 2021-11-30 RX ADMIN — POTASSIUM CHLORIDE 20 MEQ: 400 INJECTION, SOLUTION INTRAVENOUS at 08:42

## 2021-11-30 RX ADMIN — PANTOPRAZOLE SODIUM 40 MG: 40 INJECTION, POWDER, FOR SOLUTION INTRAVENOUS at 20:48

## 2021-11-30 RX ADMIN — RIFAXIMIN 550 MG: 550 TABLET ORAL at 20:48

## 2021-11-30 RX ADMIN — PANTOPRAZOLE SODIUM 40 MG: 40 INJECTION, POWDER, FOR SOLUTION INTRAVENOUS at 08:55

## 2021-11-30 RX ADMIN — Medication 20 G: at 11:40

## 2021-11-30 RX ADMIN — DEXTROSE MONOHYDRATE: 50 INJECTION, SOLUTION INTRAVENOUS at 16:08

## 2021-11-30 RX ADMIN — SODIUM CHLORIDE, PRESERVATIVE FREE 20 ML: 5 INJECTION INTRAVENOUS at 08:45

## 2021-11-30 RX ADMIN — MIDODRINE HYDROCHLORIDE 5 MG: 5 TABLET ORAL at 08:55

## 2021-11-30 RX ADMIN — MIDODRINE HYDROCHLORIDE 5 MG: 5 TABLET ORAL at 17:31

## 2021-11-30 RX ADMIN — SODIUM CHLORIDE, PRESERVATIVE FREE 10 ML: 5 INJECTION INTRAVENOUS at 20:49

## 2021-11-30 ASSESSMENT — PAIN SCALES - GENERAL
PAINLEVEL_OUTOF10: 0

## 2021-11-30 NOTE — PROGRESS NOTES
Hospitalist Progress Note      PCP: Oralia Preston    Date of Admission: 11/23/2021      Chief [address] Course:   80 y.o. female with cirrhosis, etoh use who presented to [facility] with confusion/hepatic encephalopathy and GIB. Placed in ICU.        Subjective: sitting in chair,  helping with lunch, aox 2 currently, pleasantly confused      Medications:  Reviewed    Infusion Medications    IV infusion builder      sodium chloride      sodium chloride       Scheduled Medications    midodrine  5 mg Oral TID WC    pantoprazole  40 mg IntraVENous BID    lactulose  20 g Oral 4 times per day    rifaximin  550 mg Oral BID    sodium chloride flush  5-40 mL IntraVENous 2 times per day     PRN Meds: sodium chloride, potassium chloride, sodium chloride, sodium chloride flush, ondansetron **OR** ondansetron, acetaminophen **OR** acetaminophen      Intake/Output Summary (Last 24 hours) at 11/30/2021 1202  Last data filed at 11/30/2021 0400  Gross per 24 hour   Intake 333.19 ml   Output 1300 ml   Net -966.81 ml       Physical Exam Performed:    BP (!) 123/48   Pulse 110   Temp 97.8 °F (36.6 °C) (Bladder)   Resp 17   Ht 5' 5\" (1.651 m)   Wt 125 lb 7.1 oz (56.9 kg)   SpO2 99%   BMI 20.87 kg/m²    General appearance: No apparent distress, appears stated age and cooperative. HEENT: Pupils equal, round, and reactive to light. Conjunctivae/corneas clear. Neck: Supple, with full range of motion. No jugular venous distention. Trachea midline. Respiratory:  improved respiratory effort. Clear to auscultation, bilaterally without Rales/Wheezes, some scattered Rhonchi anteriorly noted  Cardiovascular: Regular rate and rhythm with normal S1/S2 without murmurs, rubs or gallops. Abdomen: Soft, non-tender, non-distended with normal bowel sounds. Musculoskeletal: No clubbing, cyanosis or edema bilaterally. Full range of motion without deformity.   Skin: Skin color, texture, turgor normal.  No rashes or lesions. Neurologic:  Neurovascularly intact without any focal sensory/motor deficits. Cranial nerves: II-XII intact, grossly non-focal.  Psychiatric: Alert and oriented x2, thought content not quite appropriate, normal insight  Capillary Refill: Brisk,3 seconds, normal   Peripheral Pulses: +2 palpable, equal bilaterally          Labs:   Recent Labs     11/28/21  0740 11/29/21  0515 11/30/21  0510   WBC 9.7 9.2 10.4   HGB 10.3* 9.7* 8.7*   HCT 31.0* 29.1* 26.2*   * 101* 101*     Recent Labs     11/28/21  1015 11/29/21  0850 11/30/21  0510   * 148* 149*   K 3.3* 3.4* 3.1*   * 120* 118*   CO2 21 24 25   BUN 26* 20 20   CREATININE <0.5* <0.5* <0.5*   CALCIUM 8.0* 7.8* 7.4*   PHOS  --   --  2.5     No results for input(s): AST, ALT, BILIDIR, BILITOT, ALKPHOS in the last 72 hours. No results for input(s): INR in the last 72 hours. No results for input(s): Ardelle Chalk in the last 72 hours. Urinalysis:      Lab Results   Component Value Date    NITRU Negative 11/23/2021    BLOODU Negative 11/23/2021    SPECGRAV 1.015 11/23/2021    GLUCOSEU Negative 11/23/2021       Radiology:  XR ABDOMEN FOR NG/OG/NE TUBE PLACEMENT   Final Result   NG tube is in the stomach. XR CHEST PORTABLE   Final Result   Line placement without complicating feature. XR CHEST PORTABLE   Final Result   1. The endotracheal tube tip is 3 cm above the maria g. 2. Clear lungs. XR ABDOMEN (KUB) (SINGLE AP VIEW)   Final Result   Enteric tube side port projects over the expected region of the GE junction,   recommend advancement. XR ABDOMEN (KUB) (SINGLE AP VIEW)   Final Result   Enteric tube tip and side port project over the stomach. CT ABDOMEN PELVIS W IV CONTRAST Additional Contrast? None   Final Result   1.  Cirrhosis with evidence of portal hypertension including multiple upper   varices including distal esophageal and gastric varices as well as a   spontaneous splenorenal shunt. Mild third-spacing with edematous changes   throughout the abdomen and subcutaneous soft tissues. 2. Cholelithiasis with no acute features. 3. Distention with mural thickening in the duodenal sweep and proximal small   bowel. There is no evidence of obstruction. Findings may be related to a   duodenitis and enteritis. 4. Mural thickening of the distal esophagus with evidence of reflux, likely a   reflux esophagitis. CT HEAD WO CONTRAST   Final Result   Atrophy and mild small vessel ischemic disease. XR CHEST PORTABLE   Final Result   No radiographic evidence of acute pulmonary disease.                  Assessment/Plan:    Active Hospital Problems    Diagnosis     Acute respiratory failure with hypoxia ([facility] [address] [J96.01]     Upper GI bleeding [K92.2]     Acute gastric ulcer with hemorrhage [K25.0]     Acute blood loss anemia [D62]     Shock circulatory (HCC) [R57.9]     Other cirrhosis of liver ([facility] [facility] [K74.69]     Portal hypertension (HCC) [K76.6]     Hypernatremia [E87.0]     Hepatic encephalopathy ([facility] [facility] [K72.90]            Hepatic encephalopathy- with ammonia of 176 on arrival, CThead was unremarkable for bleed  -GI consulted,  -NG placed in ER, lactulose was ordered  -ppi ggt  -was on octreotide ggt  -ICU care     Possible Resp failure- related to above,   -intubated 11/24 pm to secure airway given encephalopathy and concern for aspiration, extubated 11/29  -apprec pulm/cc mgmt  -CVC placed 11/25     Acute blood loss Anemia- with concern for coffee ground emesis and upper GIB(?variceal)  -trended h/h  -Gi on board, s/p EGD 11/24(noted nonbleeding gastric dieulafoy lesion, endoclipped)  -repeat EGD 11/27(2 endoclips placed at ulceration near site of initial clipping)  -was on ppi ggt  -2u prbc ordered in ER  -tele  -Iv rocephin started 11/23, end after 7 doses   -2u prbc 11/25        Lactic acidosis-resolved, concern related to above anemia, CTa/p without obvious pathology  -trended labs  -ivf bolus given     HTN- per emr,not on meds  -monitor VS     Transaminitis/Cirrhosis/coagul[facility], apparently was taking rifaximin per emr  -started lactulose via NG on 11/24   -vit K iv ordered 11/25     DVT Prophylaxis: scd  Diet: ADULT TUBE FEEDING; Nasogastric; Peptide Based; Continuous; 20; Yes; 10; Q 4 hours; 60; 100; Q 4 hours  ADULT DIET; Dysphagia - Soft and Bite Sized; Mildly Thick (Nectar);  No Drinking Straws  Code Status: Full Code    PT/OT Eval Status: rec snf on 11/30    Dispo - transfer to floor, d5w given sodium trending up, repeat BMP this evening    Milan Rojas MD

## 2021-11-30 NOTE — PLAN OF CARE
Problem: Nutrition  Goal: Optimal nutrition therapy  Outcome: Ongoing  Note: Nutrition Problem #1: Inadequate oral intake  Intervention: Food and/or Nutrient Delivery: Continue Current Diet, Start Oral Nutrition Supplement  Nutritional Goals: Pt will tolerate and consume greater than 50% of meals and ONS this admission w/o signs of aspiration

## 2021-11-30 NOTE — PROGRESS NOTES
Physical Therapy    Facility/Department: [facility] C2 CARD TELEMETRY  Initial Assessment    NAME: Nicko Natarajan  : 1950  MRN: [id number]    Date of Service: 2021    Discharge Recommendations:  Subacute/Skilled Nursing Facility   PT Equipment Recommendations  Equipment Needed: No  Other: defer    Assessment   Body structures, Functions, Activity limitations: Decreased functional mobility ; Decreased balance; Decreased posture; Decreased strength; Decreased safe awareness; Decreased cognition; Decreased endurance; Decreased coordination  Assessment: Pt is 81 yo female who presents with diagnosis of hepatic encephalopathy. Pt intubated -. Indep with mobility at baseline. Max A x 2 for squat pivot to chair. Unable to fully stand d/t inability to fully extend hips and knees. Pt limited d/t impaired strength, activity tolerance, balance, and impaired cognition. Pt would benefit from continued skilled therapy to address deficits. Recommend SNF at d/c due to current deficits and Ax2 for mobility. Treatment Diagnosis: impaired functional mobility  Specific instructions for Next Treatment: progress mobility as tolerated  Prognosis: Good  Decision Making: High Complexity  PT Education: Goals; General Safety; Gait Training; PT Role; Disease Specific Education; Plan of Care; Functional Mobility Training; Transfer Training  Disease Specific Education: Pt educated on importance of OOB mobility, prevention of complications of bedrest, and general safety during hospitalization. Pt verbalized understanding, would benefit from reinforcement. Barriers to Learning: cognition  REQUIRES PT FOLLOW UP: Yes  Activity Tolerance  Activity Tolerance: Patient Tolerated treatment well; Patient limited by fatigue; Patient limited by endurance; Patient limited by cognitive status  Activity Tolerance: Resting in bed /59, , SpO2 97% on 3L.  Post transfer seated in chair /70, ; SpO2 97% on 3L       Patient Diagnosis(es): The primary encounter diagnosis was Hepatic encephalopathy ([facility] [address] A diagnosis of Anemia, unspecified type was also pertinent to this visit. has a past medical history of Esophagitis and Hypertension. has a past surgical history that includes endoscopic ultrasound (upper); Upper gastrointestinal endoscopy (N/A, 11/24/2021); and Upper gastrointestinal endoscopy (N/A, 11/27/2021).     Restrictions  Restrictions/Precautions  Restrictions/Precautions: General Precautions, Fall Risk  Position Activity Restriction  Other position/activity restrictions: 3L of O2, IV, ICU monitors, boyd, rectal tube  Vision/Hearing  Vision: Impaired  Vision Exceptions: Wears glasses at all times  Hearing: Within functional limits     Subjective  General  Chart Reviewed: Yes  Patient assessed for rehabilitation services?: Yes  Additional Pertinent Hx: intubated 11/24-11/29  Response To Previous Treatment: Not applicable  Family / Caregiver Present: No  Referring Practitioner: BRANDYN Gutierrez  Referral Date : 11/30/21  Diagnosis: Hepatic Encephalopathy  Follows Commands: Within Functional Limits  General Comment  Comments: Pt resting in bed on approach; RN cleared pt for therapy  Subjective  Subjective: pt agreeable to therapy  Pain Screening  Patient Currently in Pain: Denies    Orientation  Orientation  Overall Orientation Status: Impaired  Orientation Level: Oriented to place; Oriented to person; Disoriented to time; Disoriented to situation  Social/Functional History  Social/Functional History  Lives With: Spouse ( works but could stay with pt initially.)  Type of Home: House  Home Layout: Two level, Performs ADL's on one level  Home Access: Level entry  Bathroom Shower/Tub: Tub/Shower unit (Pt used hand held shower head outside of tub at baseline.)  Bathroom Toilet: Standard  Bathroom Equipment: Shower chair, Grab bars in shower, Hand-held shower  Home Equipment: Cane  ADL Assistance: Independent  Homemaking Assistance: Independent  Homemaking Responsibilities: Yes (shared responsibilities with .)  Meal Prep Responsibility: Primary  Laundry Responsibility: Secondary  Cleaning Responsibility: Secondary  Bill Paying/Finance Responsibility: Secondary  Shopping Responsibility: Secondary  Ambulation Assistance: Independent  Transfer Assistance: Independent  Active : Yes  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 Pt was found down before coming to [facility]. Objective     AROM RLE (degrees)  RLE AROM: WFL  AROM LLE (degrees)  LLE AROM : WFL  Strength RLE  Comment: Grossly 3-/5 to 3/5 throughout  Strength LLE  Comment: Grossly 3-/5 to 3/5 throughout        Bed mobility  Supine to Sit: Maximum assistance; 2 Person assistance (HOB elevated, increased time, cues for technique)  Sit to Supine: Unable to assess (pt up in chair at end of session)     Transfers  Sit to Stand:  (unable to achieve full standing)  Bed to Chair: Maximum assistance; 2 Person Assistance (squat pivot bed to chair with max A x 2. Cues for technique. Limited standing tolerance and unable to fully extend knees and hips)  Squat Pivot Transfers: Maximum Assistance; 2 Person Assistance        Balance  Sitting - Static: Fair  Sitting - Dynamic: Fair; -  Standing - Static: Poor  Standing - Dynamic: Poor  Comments: min-mod A for sitting balance at EOB d/t right lateral lean.  Pt unable to correct without assist        Plan   Plan  Times per week: 3-5x/wk  Times per day: Daily  Specific instructions for Next Treatment: progress mobility as tolerated  Current Treatment Recommendations: Strengthening, Neuromuscular Re-education, Home Exercise Program, Safety Education & Training, Balance Training, Endurance Training, Functional Mobility Training, Transfer Training, Gait Training, Equipment Evaluation, Education, & procurement, Patient/Caregiver Education &

## 2021-11-30 NOTE — PROGRESS NOTES
Occupational Therapy   Occupational Therapy Initial Assessment/Treatment  Date: 2021   Patient Name: Joan Kline  MRN: [id number]     : 1950    Date of Service: 2021    Discharge Recommendations:  [address]  OT Equipment Recommendations  Other: defer    Assessment   Performance deficits / Impairments: Decreased functional mobility ; Decreased ADL status; Decreased ROM; Decreased safe awareness; Decreased cognition; Decreased balance; Decreased endurance; Decreased high-level IADLs; Decreased fine motor control; Decreased coordination; Decreased posture    Assessment: Pt is a 68yo female with deficits in the areas listed above with Hepatic encephalopathy and extubated . Pt's  reports that the pt was mod (I) with ADLS and functional mobiltiy at baseline. Today pt was total A with all ADLS and functional t/fs (sit pivot with max Ax2). Pt inconsistent with command following this date. Pt would continue to benefit from skilled OT services to increase safety and participation in ADLS and functional mobility. Prognosis: Fair    Decision Making: High Complexity  OT Education: OT Role; Plan of Care; Transfer Training; Orientation  Patient Education: disease specific: safe t/fs, posture seated EOB, use of call button, general safety, prevention of complications of bedrest, importance of OOB mobility. Pt did not show signs of understanding and will need reinforcement. Barriers to Learning: cognition  REQUIRES OT FOLLOW UP: Yes  Activity Tolerance  Activity Tolerance: Treatment limited secondary to decreased cognition  Activity Tolerance: , 97% on 3 L, /59; end of session: /70, O2 97%, . Safety Devices  Safety Devices in place: Yes  Type of devices: Nurse notified; Gait belt; Call light within reach; Chair alarm in place; Left in chair           Patient Diagnosis(es): The primary encounter diagnosis was Hepatic encephalopathy ([facility] [address]  A diagnosis of Anemia, unspecified type was also pertinent to this visit. has a past medical history of Esophagitis and Hypertension. has a past surgical history that includes endoscopic ultrasound (upper); Upper gastrointestinal endoscopy (N/A, 11/24/2021); and Upper gastrointestinal endoscopy (N/A, 11/27/2021). Restrictions  Restrictions/Precautions  Restrictions/Precautions: General Precautions, Fall Risk  Position Activity Restriction  Other position/activity restrictions: 3L of O2, IV, ICU monitors, boyd, rectal tube    Subjective   General  Chart Reviewed: Yes  Patient assessed for rehabilitation services?: Yes  Additional Pertinent Hx: Per chart 11/24, \"79 y.o. female with cirrhosis, etoh use who presented to [facility] with confusion. Pt was found by  today this morning around 6-7am, standing up between the couch and coffee table. He recalls hearing a 'crash' this morning prompting him to come down to check on her. She was INcoherent this am.  He believes she still drinks but is unsure(he works 12 hour shifts). She was relatively normal last night but noted to be more fatigued/tired and sleepy than usual. She has been off her meds and has hx of poor compliance with follow up. \"  Response to previous treatment: Patient with no complaints from previous session  Family / Caregiver Present: Yes (.)  Referring Practitioner: BEBO Whitfield CNP  Diagnosis: Hepatic encephalopathy, Esophagogastroduodenoscopy (11/24, 11/27), Intubated 11/24-11/29  Subjective  Subjective: Pt in bed and agreeable to therapy. General Comment  Comments: RN approved therapy.   Patient Currently in Pain: Denies  Vital Signs  Pulse: 110  BP: (!) 123/48  MAP (mmHg): 67  Patient Currently in Pain: Denies  Oxygen Therapy  SpO2: 99 %  Social/Functional History  Social/Functional History  Lives With: Spouse ( works but could stay with pt initially.)  Type of Home: House  Home Layout: Two level, Performs ADL's on one level  Home Access: Level entry  Bathroom Shower/Tub: Tub/Shower unit (Pt used hand held shower head outside of tub at baseline.)  Bathroom Toilet: Standard  Bathroom Equipment: Shower chair, Grab bars in shower, Hand-held shower  Home Equipment: Cane  ADL Assistance: [address]: [address] [facility] Responsibilities: Yes (shared responsibilities with .)  Meal Prep Responsibility: Primary  Laundry Responsibility: Secondary  Cleaning Responsibility: Secondary  Bill Paying/Finance Responsibility: Secondary  Shopping Responsibility: Secondary  Ambulation Assistance: Independent  Transfer Assistance: Independent  Active : Yes  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 Pt was found down before coming to [facility]. Objective   Vision: Impaired  Vision Exceptions: Wears glasses at all times  Hearing: Within functional limits    Orientation  Overall Orientation Status: Impaired  Orientation Level: Oriented to person; Oriented to place; Disoriented to situation; Disoriented to time     Balance  Sitting Balance: Maximum assistance (Pt leaning R seated EOB.)  Standing Balance: Dependent/Total (max Ax2 for squat pivot t/f from EOB to chair.)  ADL  Grooming: Dependent/Total (comb hair in chair after pt attempt.)  LE Dressing: Dependent/Total (To don socks after pt attempt.)  Toileting: Dependent/Total  Tone RUE  Tone Description: Pt with poor command following and possibly resisting movement but with increased tightness in RUE.   Tone LUE  LUE Tone: Normotonic  Coordination  Movements Are Fluid And Coordinated: No  Coordination and Movement description: Fine motor impairments; Gross motor impairments; Tremors     Bed mobility  Supine to Sit: Maximum assistance; 2 Person assistance (HOB elevated, increased time, max vc)  Sit to Supine: Unable to assess (Pt ended session seated in chair.)  Transfers  Sit Pivot Transfers: 2 Person assistance; Dependent/Total (EOB to chair)     Cognition  Overall Cognitive Status: Exceptions  Arousal/Alertness: Delayed responses to stimuli  Following Commands: Inconsistently follows commands  Attention Span: Attends with cues to redirect; Difficulty attending to directions  Memory: Decreased short term memory; Decreased recall of biographical Information; Decreased recall of recent events  Safety Judgement: Decreased awareness of need for safety; Decreased awareness of need for assistance  Problem Solving: Decreased awareness of errors; Assistance required to identify errors made; Assistance required to implement solutions  Insights: Not aware of deficits  Initiation: Requires cues for all  Sequencing: Requires cues for all        Sensation  Overall Sensation Status: WFL (pt denies N/T)        LUE PROM (degrees)  LUE PROM: WFL  Left Hand PROM (degrees)  Left Hand PROM: WFL  RUE PROM (degrees)  RUE PROM: Exceptions  R Shoulder Flex  0-180: ~95*  LUE Strength  L Hand General: 4/5  LUE Strength Comment: Unable to formally test d/t inconsistent command following. RUE Strength  R Hand General: 4/5  RUE Strength Comment: Unable to formally test d/t inconsistent command following.                    Plan   Plan  Times per week: 4-6x/week  Current Treatment Recommendations: Strengthening, Balance Training, Functional Mobility Training, Endurance Training, Cognitive Reorientation, Safety Education & Training, Patient/Caregiver Education & Training, Self-Care / ADL, Positioning    AM-PAC Score        AM-[facility] Inpatient Daily Activity Raw Score: 7 (11/30/21 1116)  AM-PAC Inpatient ADL T-Scale Score : 20.13 (11/30/21 1116)  ADL Inpatient CMS 0-100% Score: 92.44 (11/30/21 1116)  ADL Inpatient CMS G-Code Modifier : CM (11/30/21 1116)    Goals  Short term goals  Time Frame for Short term goals: 1 week (12/6) unless stated otherwise  Short term goal 1: Pt will LB dress with mod A.  Short term goal 2: Pt will perform at least 2 simple ADLS with min A (64/2). Curry Alpha term goal 3: Pt will sit EOB with min A to assist with ADLS. Short term goal 4: Pt will UB dress with mod A  Patient Goals   Patient goals : did not verbalized but motivated to put on socks. Therapy Time   Individual Concurrent Group Co-treatment   Time In 0919         Time Out 0953         Minutes 34         Timed Code Treatment Minutes: 24 Minutes (10min eval)       Ondina Lamas, OTR/L  If pt discharges prior to next session, this note will serve as discharge summary. See case management note for discharge disposition.

## 2021-11-30 NOTE — PROGRESS NOTES
Speech Language Pathology  Facility/Department: [facility] C2 CARD TELEMETRY   CLINICAL BEDSIDE SWALLOW EVALUATION      Recommended Diet and Intervention  Diet Solids Recommendation: Dysphagia Soft and Bite-Sized (Dysphagia III)  Liquid Consistency Recommendation: Mildly Thick (Nectar) (No straws)  Recommended Form of Meds: Meds whole or crushed in puree    NAME: Katey De La Paz  : 1950  MRN: [id number]    ADMISSION DATE: 2021  ADMITTING DIAGNOSIS: has Kyphosis of thoracic region; DDD (degenerative disc disease), thoracic; Scapular dyskinesis; Hepatic encephalopathy ([address] Hyponatremia; Acute metabolic encephalopathy; Hypokalemia; QT prolongation; Lactic acidosis; Elevated LFTs; Moderate malnutrition ([address] Disc displacement, thoracic; Chronic thoracic back pain; Chronic thoracic spine pain; Acute respiratory failure with hypoxia ([address] Upper GI bleeding; Acute gastric ulcer with hemorrhage; Acute blood loss anemia; Shock circulatory ([address] Other cirrhosis of liver ([address] Portal hypertension ([address] and Hypernatremia on their problem list.  ONSET DATE: Pt admitted to [facility] on 21    Recent Chest Xray (21): FINDINGS:   ETT and NG tube remain in satisfactory position.  A right IJ catheter is been   placed with tip over the lower SVC.  There is no pneumothorax, consolidation   or effusion.  Heart size and vascularity are stable. Date of Eval: 2021  Evaluating Therapist: RENETTA Liz    Current Diet level:  Current Diet : NPO (Pending BSE)  Current Liquid Diet : NPO (Pending BSE)      Primary Complaint  Patient Complaint: Per MD H&P, \"  80 y.o. female with cirrhosis, etoh use who presented to [facility] with confusion. Pt was found by  today this morning around 6-7am, standing up between the couch and coffee table. He recalls hearing a 'crash' this morning prompting him to come down to check on her.   She was INcoherent this am.  He believes she still drinks but is unsure(he works 12 hour shifts). She was relatively normal last night but noted to be more fatigued/tired and sleepy than usual. She has been off her meds and has hx of poor compliance with follow up. No n/v/diarrhea  -Pt was brought in for further evaluation     ER course: NG placed, noted significant quantiy of dark red outpt, GI consulted in ER (informed about need for egd given findings), ppi ggt and octreotide ggt started\". Pain:  Pain Assessment  Pain Assessment: 0-10  Pain Level: 0  RASS Score: Alert and calm    Reason for Referral  Jen Whitley was referred for a bedside swallow evaluation to assess the efficiency of her swallow function, identify signs and symptoms of aspiration and make recommendations regarding safe dietary consistencies, effective compensatory strategies, and safe eating environment. Impression  Dysphagia Diagnosis: Mild oral stage dysphagia; Suspected needs further assessment; Mild pharyngeal stage dysphagia  Dysphagia Outcome Severity Scale: Level 4: Mild moderate dysphagia- Intermittent supervision/cueing. One - two diet consistencies restricted   Pt seen upright in bed, alert and agreeable to evaluation, pleasantly confused (oriented to self and place, disoriented to time and situation). RN OK'd SLP entry and evaluation and at bedside throughout BSE. Per chart, pt pulled NG earlier this AM. Pt with occasional nonsensical speech noted, attempting to chew PO meds (administered by [facility] [place]) despite cues. Pt required assistance from SLP with all PO trials. Reduced bolus control with suspected premature bolus loss to pharynx & delayed swallow initiation throughout. Pt with immediate cough and/or throat clear post-swallow with majority of thin liquid trials (see pharyngeal phase section below). No clinical s/s of aspiration with solid PO or nectar (mildly thick) liquids via cued small cup sip.   Trace oral residue post-swallow with regular solid trials, successfully cleared with cued use of liquid wash. Recommend initiating Dysphagia III (soft and bite sized) diet with nectar (mildly thick) liquids, meds whole or crushed in puree, assist feed initially. RN aware of recs, whiteboard updated in room. ST to continue to follow. Vitals/labs:   Temp: n/a  SpO2: 96-98%  RR: 24/min  BP: 135/62  HR: 101   WBC: 10.4    Treatment Plan  Requires SLP Intervention: Yes  Duration/Frequency of Treatment: 3-5x/week for LOS  D/C Recommendations: To be determined    Recommended Diet and Intervention  Diet Solids Recommendation: Dysphagia Soft and Bite-Sized (Dysphagia III)  Liquid Consistency Recommendation: Mildly Thick (Nectar) (No straws)  Recommended Form of Meds: Meds in puree  Recommendations: Dysphagia treatment; Assist feed  Therapeutic Interventions: Diet tolerance monitoring; Oral care; Therapeutic PO trials with SLP; Patient/Family education    Compensatory Swallowing Strategies  Compensatory Swallowing Strategies: Eat/Feed slowly; No straws; Upright as possible for all oral intake; Alternate solids and liquids; Assist feed; Remain upright for 30-45 minutes after meals; Small bites/sips    Treatment/Goals  Short-term Goals  Timeframe for Short-term Goals: 7 days (12/7/21)  Long-term Goals  Timeframe for Long-term Goals: 10 days (12/10/21)  Goal 1: The pt will tolerate safest and least restrictive diet without clinical s/s of aspiration or change in respiratory status. Dysphagia Goals: The patient will tolerate recommended diet without observed clinical signs of aspiration; The patient/caregiver will demonstrate understanding of compensatory strategies for improved swallowing safety.; The patient will tolerate thin liquids without signs and symptoms of aspiration 10/10 via cup. General  Chart Reviewed: Yes  Comments: Pt admitted d/t hepatic encephalopathy, anemia. Pt s/p EGD on 11/24 and 11/27. Pt was intubated from 11/24-11/29 per chart.   Behavior/Cognition: Alert; Cooperative; Pleasant mood; Confused; Requires cueing; Distractible  Respiratory Status: O2 via nasual cannula  O2 Device: Nasal cannula  Liters of Oxygen: 3 L  Communication Observation: Functional (Pleasantly confused)  Follows Directions: Simple  Dentition: Adequate  Patient Positioning: Upright in bed  Baseline Vocal Quality: Weak  Volitional Cough: Weak  Volitional Swallow: Delayed  Prior Dysphagia History: No hx of dysphagia per chart review. Consistencies Administered: Reg solid; Dysphagia Pureed (Dysphagia I); Dysphagia Soft and Bite-Sized (Dysphagia III); Thin - teaspoon; Thin - cup; Ice Chips; Nectar - cup; Nectar - straw    Vision/Hearing  Vision  Vision: Impaired  Vision Exceptions: Wears glasses at all times  Hearing  Hearing: Within functional limits    Oral Motor Deficits  Oral/Motor  Oral Motor: Exceptions to [facility]  Labial Strength: Reduced  Lingual Strength: Reduced    Oral Phase Dysfunction  Oral Phase  Oral Phase: Exceptions  Oral Phase Dysfunction  Suspected Premature Bolus Loss: All  Lingual/Palatal Residue: Reg solid (Trace)     Indicators of Pharyngeal Phase Dysfunction   Pharyngeal Phase  Pharyngeal Phase: Exceptions  Indicators of Pharyngeal Phase Dysfunction  Delayed Swallow: All  Throat Clearing - Immediate: Thin - cup; Thin - teaspoon; Ice chips  Cough - Immediate: Thin - cup; Nectar - straw    Prognosis  Prognosis  Prognosis for safe diet advancement: good  Barriers to reach goals: fatigue; time post onset  Individuals consulted  Consulted and agree with results and recommendations: Patient; RN    Education  Patient Education:Pt educated on reason for referral, role of ST, assessment results and recommendations. Patient Education Response: Verbalizes understanding; Needs reinforcement; No evidence of learning  Safety Devices in place: Yes  Type of devices: Bed alarm in place; Call light within reach; Nurse notified;  Left in bed       Therapy Time  SLP Individual Minutes  Time In: 0845  Time Out: 8672  Minutes: 28; dysphagia eval and tx    Jennifer Lynn M.S. [id number] [facility]  Speech-language pathologist  [phone]  Speech Desk Phone: [phone]

## 2021-11-30 NOTE — PROGRESS NOTES
Continue Current Diet, Start Oral Nutrition Supplement  Nutrition Education/Counseling:  No recommendation at this time   Coordination of Nutrition Care:  Continue to monitor while inpatient, Speech Therapy    Goals:  Pt will tolerate and consume greater than 50% of meals and ONS this admission w/o signs of aspiration       Nutrition Monitoring and Evaluation:   Behavioral-Environmental Outcomes:  None Identified   Food/Nutrient Intake Outcomes:  Diet Advancement/Tolerance, Food and Nutrient Intake, Supplement Intake  Physical Signs/Symptoms Outcomes:  Biochemical Data, Chewing or Swallowing, Nutrition Focused Physical Findings, Weight     Discharge Planning:     Too soon to determine     Electronically signed by Gage Munoz, MS, RD, LD on 11/30/21 at 1:28 PM EST    Contact: [phone]

## 2021-11-30 NOTE — PROGRESS NOTES
Pulmonary & Critical Care Medicine ICU Progress Note    Admit Date: 2021  PCP: Cyndi Regalado    CC:  Respiratory failure   Events of Last 24 hours:    Patient's IV sedation was tapered to DC and patient was given a trial of CPAP with pressure support which the patient was tolerating well, patient was generating good tidal volumes and had maintained acid-base balance and patient was alert on the ventilator and for that reason patient was extubated to nasal cannula oxygen, patient continued to be hypotensive and patient was continued to be on IV pressors which were continuing when seen this morning, patient was tolerating extubation when seen this morning, patient was afebrile, patient was n.p.o. overnight as patient had pulled out the NG tube overnight, patient was also continued to be having rectal tube in place, patient was afebrile, patient had sinus tachycardia on the monitor, patient has had borderline urine output overnight with cumulative fluid balance of +3.4 L, per pertinent review of system of concern    Vitals:  Tmax:  VITALS:  BP (!) 145/59   Pulse 114   Temp 98.4 °F (36.9 °C) (Oral)   Resp 17   Ht 5' 5\" (1.651 m)   Wt 125 lb 7.1 oz (56.9 kg)   SpO2 97%   BMI 20.87 kg/m²   24HR INTAKE/OUTPUT:    CURRENT PULSE OXIMETRY:  SpO2: 97 %  24HR PULSE OXIMETRY RANGE:  SpO2  Av.6 %  Min: 90 %  Max: 100 %        EXAM:  General: No distress on nasal cannula oxygen when seen  Eyes: PERRL. No sclera icterus. No conjunctival injection. ENT: ETT in place, no facial asymmetry or thyroid enlargement  Neck: Trachea midline. Normal thyroid. Resp: No significant wheezing, minimal basilar crackles with decreased breath sound intensity  CV: Sinus tachycardia. No mumur or rub. +1 edema   GI: Non-tender. Non-distended. No masses. No organmegaly. Normal to hyperactive bowel sounds. Skin: Warm and dry. No nodule on exposed extremities. No rash on exposed extremities.   Neuro: Alert and communicative but slight confusion still present when seen        IV:   IV infusion builder      sodium chloride      sodium chloride           Scheduled Meds:     midodrine  5 mg Oral TID WC    pantoprazole  40 mg IntraVENous BID    lactulose  20 g Oral 4 times per day    rifaximin  550 mg Oral BID    sodium chloride flush  5-40 mL IntraVENous 2 times per day         Diet: ADULT DIET; Dysphagia - Soft and Bite Sized; Mildly Thick (Nectar); No Drinking Straws  ADULT ORAL NUTRITION SUPPLEMENT; Breakfast, Lunch, Dinner; Standard 4 oz Oral Supplement     Results:  CBC:   Recent Labs     11/28/21  0740 11/29/21  0515 11/30/21  0510   WBC 9.7 9.2 10.4   HGB 10.3* 9.7* 8.7*   HCT 31.0* 29.1* 26.2*   MCV 86.3 85.4 86.4   * 101* 101*     BMP:   Recent Labs     11/28/21  1015 11/28/21  1015 11/29/21  0850 11/30/21  0510 11/30/21  1207   *  --  148* 149*  --    K 3.3*   < > 3.4* 3.1* 3.7   *  --  120* 118*  --    CO2 21  --  24 25  --    PHOS  --   --   --  2.5  --    BUN 26*  --  20 20  --    CREATININE <0.5*  --  <0.5* <0.5*  --     < > = values in this interval not displayed. Results for Slime Pelletier" (MRN [id number]) as of 11/30/2021 18:11   Ref.  Range 11/25/2021 08:28 11/26/2021 10:06 11/27/2021 05:10 11/28/2021 07:50 11/29/2021 11:05   O2 Therapy Unknown  Unknown Unknown Unknown    Hemoglobin, Art, Extended Latest Ref Range: 12.0 - 16.0 g/dL  8.3 (L) 10.2 (L) 10.8 (L)    pH, Arterial Latest Ref Range: 7.350 - 7.450  7.464 (H) 7.421 7.321 (L) 7.378 7.419   pCO2, Arterial Latest Ref Range: 35.0 - 45.0 mm Hg 29.5 (L) 34.5 (L) 40.1 34.4 (L) 36.1   pO2, Arterial Latest Ref Range: 75.0 - 108.0 mm Hg 119.0 (H) 128.1 (H) 101.3 109.8 (H) 92.2   HCO3, Arterial Latest Ref Range: 21.0 - 29.0 mmol/L 21.2 21.9 20.2 (L) 19.8 (L) 23.4   TCO2 (calc), Art Latest Ref Range: Not Established mmol/L 22 23.0 21.5 20.9 25   Base Excess, Arterial Latest Ref Range: -3 - 3  -3 -2.2 -5.4 (L) -4.7 (L) -1   O2 Sat, Arterial Latest Ref Range: 93 - 100 % 99 98.1 96.6 97.5 97   Methemoglobin, Arterial Latest Ref Range: <1.5 %  0.6 0.4 0.6    Carboxyhgb, Arterial Latest Ref Range: 0.0 - 1.5 %  0.4 0.3 0.1      Results for Dario Client \"BHAVIK\" (MRN [id number]) as of 11/30/2021 18:11   Ref. Range 11/29/2021 08:50 11/30/2021 05:10   Sodium Latest Ref Range: 136 - 145 mmol/L 148 (H) 149 (H)   Potassium Latest Ref Range: 3.5 - 5.1 mmol/L 3.4 (L) 3.1 (L)   Chloride Latest Ref Range: 99 - 110 mmol/L 120 (H) 118 (H)   CO2 Latest Ref Range: 21 - 32 mmol/L 24 25   BUN Latest Ref Range: 7 - 20 mg/dL 20 20   Creatinine Latest Ref Range: 0.6 - 1.2 mg/dL <0.5 (L) <0.5 (L)   Anion Gap Latest Ref Range: 3 - 16  4 6   GFR Non- Latest Ref Range: >60  >60 >60   GFR  Latest Ref Range: >60  >60 >60   Magnesium Latest Ref Range: 1.80 - 2.40 mg/dL  1.80   Glucose Latest Ref Range: 70 - 99 mg/dL 136 (H) 107 (H)   Calcium Latest Ref Range: 8.3 - 10.6 mg/dL 7.8 (L) 7.4 (L)   Phosphorus Latest Ref Range: 2.5 - 4.9 mg/dL  2.5   Albumin Latest Ref Range: 3.4 - 5.0 g/dL  2.2 (L)     Assessment/Plan:      Active Problems:    Hepatic encephalopathy (HCC)    Acute respiratory failure with hypoxia (HCC)    Upper GI bleeding    Acute gastric ulcer with hemorrhage    Acute blood loss anemia    Shock circulatory (HCC)    Other cirrhosis of liver (HCC)    Portal hypertension (HCC)    Hypernatremia  Moderate protein calorie malnutrition  Resolved Problems:    * No resolved hospital problems.  *      Oxygen supplementation to keep saturation between 9094% only  Pulmonary toilet  Status post upper GI endoscopy  Status post packed RBC transfusion  Monitor H&H and platelets closely  IV pressors to keep mean arterial pressure more 65 mmHg  Patient was started on midodrine overnight  Nursing was requested to taper the IV Levophed infusion if possible  Patient has been starting empirically on IV Rocephin-can be discontinued  Lactulose and rifaximin to

## 2021-11-30 NOTE — PROGRESS NOTES
Physician Progress Note      Arelis Hernandes  CSN #:                  [id number]  :                       1950  ADMIT DATE:       2021 9:05 AM  DISCH DATE:  RESPONDING  PROVIDER #:        Pamella Aguilar MD          QUERY TEXT:    Pt admitted with hepatic encephalopathy. If possible, please document in   progress notes and discharge summary further specificity regarding the acuity   of encephalopathy:    The medical record reflects the following:  Risk Factors: alcoholic cirrhosis, hematemesis  Clinical Indicators: Lactic acidosis, ammonia 176. Per progress note    \"Subjective:  Remains obtunded/sleepy\",  Per OP note \"Preoperative Diagnosis:   Hematemesis and cirrhosis/coma (encephalopathy)\". Per H&P \"Hepatic   encephalopathy- with ammonia of 176 on arrival\". Per ED consult note \"patient   found on the floor this AM, unresponsive to . Last known well was   sometime last evening.  per squad. Patient becoming more responsive with   yes/no per squad\". Treatment: Lactulose, EGD, head CT, ICU care, serial labs, supportive care    Thank you,  [email or web address]  Options provided:  -- Acute hepatic encephalopathy with coma  -- Acute hepatic encephalopathy without coma  -- Other - I will add my own diagnosis  -- Disagree - Not applicable / Not valid  -- Disagree - Clinically unable to determine / Unknown  -- Refer to Clinical Documentation Reviewer    PROVIDER RESPONSE TEXT:    This patient has acute hepatic encephalopathy with coma.     Query created by: Fredis Moses on 2021 1:54 PM      Electronically signed by:  Pamella Aguilar MD 2021 5:41 PM

## 2021-11-30 NOTE — CARE COORDINATION
Writer met with pt's , Denisse Zapata, at bedside, explained PT/OT recommendations for SNF and provided Medicare SNF list with current ratings. Denisse Zapata will review list with family and let CM know of top choices for referrals. CM will continue to follow pt's progress and coordinate discharge arrangements as appropriate.   JENNIFER Miranda-TAN

## 2021-11-30 NOTE — PROGRESS NOTES
hours.    Imaging Last 24 Hours:  No results found. Assessment//Plan           Hospital Problems           Last Modified POA    Hepatic encephalopathy ([address] 11/23/2021 Yes    Acute respiratory failure with hypoxia ([address] 11/29/2021 Yes    Upper GI bleeding 11/29/2021 Yes    Acute gastric ulcer with hemorrhage 11/29/2021 Yes    Acute blood loss anemia 11/29/2021 Yes    Shock circulatory ([address] 11/29/2021 Yes    Other cirrhosis of liver ([address] 11/29/2021 Yes    Portal hypertension ([address] 11/29/2021 Yes    Hypernatremia 11/29/2021 Yes        Assessment & Plan  80-year-old female with history of alcoholic cirrhosis admitted with encephalopathy and GI bleed. EGD showed no varices but Dieulafoy lesion in proximal stomach which was clipped. H/H is stable, no D/C per NGT and has brown stools.     Plan:   1. Supportive care  2. Continue Protonix  3. F/U H/H and transfuse as appropriate  4.  Will follow   MD Clarissa Echeverria [phone]      Electronically signed by Tam Oswald MD on 11/30/21 at 11:01 AM EST

## 2021-11-30 NOTE — PLAN OF CARE
Problem: Nutrition  Intervention: Swallowing evaluation  Note: Bedside swallow evaluation completed this date. Bina oRnquillo M.S. [id number] [facility]  Speech-language pathologist  [phone]      Intervention: Aspiration precautions  Note: Bedside swallow evaluation completed this date.     Bina Ronquillo M.S. [id number] [facility]  Speech-language pathologist  [phone]

## 2021-12-01 LAB
ALBUMIN SERPL-MCNC: 2 G/DL (ref 3.4–5)
ANION GAP SERPL CALCULATED.3IONS-SCNC: 4 MMOL/L (ref 3–16)
ANION GAP SERPL CALCULATED.3IONS-SCNC: 6 MMOL/L (ref 3–16)
BASOPHILS ABSOLUTE: 0.1 K/UL (ref 0–0.2)
BASOPHILS RELATIVE PERCENT: 1 %
BUN BLDV-MCNC: 27 MG/DL (ref 7–20)
BUN BLDV-MCNC: 27 MG/DL (ref 7–20)
CALCIUM SERPL-MCNC: 7.9 MG/DL (ref 8.3–10.6)
CALCIUM SERPL-MCNC: 8 MG/DL (ref 8.3–10.6)
CHLORIDE BLD-SCNC: 113 MMOL/L (ref 99–110)
CHLORIDE BLD-SCNC: 117 MMOL/L (ref 99–110)
CO2: 27 MMOL/L (ref 21–32)
CO2: 27 MMOL/L (ref 21–32)
CREAT SERPL-MCNC: <0.5 MG/DL (ref 0.6–1.2)
CREAT SERPL-MCNC: <0.5 MG/DL (ref 0.6–1.2)
EOSINOPHILS ABSOLUTE: 0.2 K/UL (ref 0–0.6)
EOSINOPHILS RELATIVE PERCENT: 2.1 %
GFR AFRICAN AMERICAN: >60
GFR AFRICAN AMERICAN: >60
GFR NON-AFRICAN AMERICAN: >60
GFR NON-AFRICAN AMERICAN: >60
GLUCOSE BLD-MCNC: 117 MG/DL (ref 70–99)
GLUCOSE BLD-MCNC: 118 MG/DL (ref 70–99)
GLUCOSE BLD-MCNC: 120 MG/DL (ref 70–99)
HCT VFR BLD CALC: 22.7 % (ref 36–48)
HCT VFR BLD CALC: 23.4 % (ref 36–48)
HEMOGLOBIN: 7.5 G/DL (ref 12–16)
HEMOGLOBIN: 7.7 G/DL (ref 12–16)
LYMPHOCYTES ABSOLUTE: 1.9 K/UL (ref 1–5.1)
LYMPHOCYTES RELATIVE PERCENT: 20 %
MAGNESIUM: 2 MG/DL (ref 1.8–2.4)
MCH RBC QN AUTO: 28.4 PG (ref 26–34)
MCHC RBC AUTO-ENTMCNC: 32.9 G/DL (ref 31–36)
MCV RBC AUTO: 86.6 FL (ref 80–100)
MONOCYTES ABSOLUTE: 1.1 K/UL (ref 0–1.3)
MONOCYTES RELATIVE PERCENT: 10.9 %
NEUTROPHILS ABSOLUTE: 6.4 K/UL (ref 1.7–7.7)
NEUTROPHILS RELATIVE PERCENT: 66 %
PDW BLD-RTO: 20 % (ref 12.4–15.4)
PERFORMED ON: ABNORMAL
PHOSPHORUS: 3.3 MG/DL (ref 2.5–4.9)
PLATELET # BLD: 97 K/UL (ref 135–450)
PLATELET SLIDE REVIEW: ABNORMAL
PMV BLD AUTO: 8.2 FL (ref 5–10.5)
POTASSIUM SERPL-SCNC: 3.5 MMOL/L (ref 3.5–5.1)
POTASSIUM SERPL-SCNC: 3.6 MMOL/L (ref 3.5–5.1)
RBC # BLD: 2.62 M/UL (ref 4–5.2)
SLIDE REVIEW: ABNORMAL
SODIUM BLD-SCNC: 144 MMOL/L (ref 136–145)
SODIUM BLD-SCNC: 150 MMOL/L (ref 136–145)
WBC # BLD: 9.6 K/UL (ref 4–11)

## 2021-12-01 PROCEDURE — 6370000000 HC RX 637 (ALT 250 FOR IP): Performed by: NURSE PRACTITIONER

## 2021-12-01 PROCEDURE — 2580000003 HC RX 258: Performed by: INTERNAL MEDICINE

## 2021-12-01 PROCEDURE — 85018 HEMOGLOBIN: CPT

## 2021-12-01 PROCEDURE — 83735 ASSAY OF MAGNESIUM: CPT

## 2021-12-01 PROCEDURE — 2700000000 HC OXYGEN THERAPY PER DAY

## 2021-12-01 PROCEDURE — 1200000000 HC SEMI PRIVATE

## 2021-12-01 PROCEDURE — 85014 HEMATOCRIT: CPT

## 2021-12-01 PROCEDURE — C9113 INJ PANTOPRAZOLE SODIUM, VIA: HCPCS | Performed by: INTERNAL MEDICINE

## 2021-12-01 PROCEDURE — 94761 N-INVAS EAR/PLS OXIMETRY MLT: CPT

## 2021-12-01 PROCEDURE — 97110 THERAPEUTIC EXERCISES: CPT

## 2021-12-01 PROCEDURE — 99232 SBSQ HOSP IP/OBS MODERATE 35: CPT | Performed by: INTERNAL MEDICINE

## 2021-12-01 PROCEDURE — 80069 RENAL FUNCTION PANEL: CPT

## 2021-12-01 PROCEDURE — 97530 THERAPEUTIC ACTIVITIES: CPT

## 2021-12-01 PROCEDURE — 92526 ORAL FUNCTION THERAPY: CPT

## 2021-12-01 PROCEDURE — 6360000002 HC RX W HCPCS: Performed by: INTERNAL MEDICINE

## 2021-12-01 PROCEDURE — 6370000000 HC RX 637 (ALT 250 FOR IP): Performed by: INTERNAL MEDICINE

## 2021-12-01 PROCEDURE — 36592 COLLECT BLOOD FROM PICC: CPT

## 2021-12-01 PROCEDURE — 85025 COMPLETE CBC W/AUTO DIFF WBC: CPT

## 2021-12-01 PROCEDURE — 97535 SELF CARE MNGMENT TRAINING: CPT

## 2021-12-01 RX ORDER — DEXTROSE MONOHYDRATE 50 MG/ML
INJECTION, SOLUTION INTRAVENOUS CONTINUOUS
Status: ACTIVE | OUTPATIENT
Start: 2021-12-01 | End: 2021-12-01

## 2021-12-01 RX ADMIN — Medication 10 ML: at 06:00

## 2021-12-01 RX ADMIN — SODIUM CHLORIDE, PRESERVATIVE FREE 10 ML: 5 INJECTION INTRAVENOUS at 10:23

## 2021-12-01 RX ADMIN — PANTOPRAZOLE SODIUM 40 MG: 40 INJECTION, POWDER, FOR SOLUTION INTRAVENOUS at 10:23

## 2021-12-01 RX ADMIN — Medication 100 MG: at 10:23

## 2021-12-01 RX ADMIN — SODIUM CHLORIDE, PRESERVATIVE FREE 10 ML: 5 INJECTION INTRAVENOUS at 21:12

## 2021-12-01 RX ADMIN — LACTULOSE 20 G: 20 SOLUTION ORAL at 10:24

## 2021-12-01 RX ADMIN — MIDODRINE HYDROCHLORIDE 5 MG: 5 TABLET ORAL at 21:12

## 2021-12-01 RX ADMIN — PANTOPRAZOLE SODIUM 40 MG: 40 INJECTION, POWDER, FOR SOLUTION INTRAVENOUS at 21:12

## 2021-12-01 RX ADMIN — RIFAXIMIN 550 MG: 550 TABLET ORAL at 21:12

## 2021-12-01 RX ADMIN — DEXTROSE MONOHYDRATE: 50 INJECTION, SOLUTION INTRAVENOUS at 11:54

## 2021-12-01 RX ADMIN — RIFAXIMIN 550 MG: 550 TABLET ORAL at 10:24

## 2021-12-01 RX ADMIN — FOLIC ACID 1 MG: 1 TABLET ORAL at 10:23

## 2021-12-01 RX ADMIN — LACTULOSE 20 G: 20 SOLUTION ORAL at 15:39

## 2021-12-01 RX ADMIN — LACTULOSE 20 G: 20 SOLUTION ORAL at 21:12

## 2021-12-01 RX ADMIN — MIDODRINE HYDROCHLORIDE 5 MG: 5 TABLET ORAL at 10:24

## 2021-12-01 RX ADMIN — MIDODRINE HYDROCHLORIDE 5 MG: 5 TABLET ORAL at 15:38

## 2021-12-01 ASSESSMENT — PAIN SCALES - PAIN ASSESSMENT IN ADVANCED DEMENTIA (PAINAD)
CONSOLABILITY: 0
NEGVOCALIZATION: 0
FACIALEXPRESSION: 0
BODYLANGUAGE: 0
TOTALSCORE: 0
BREATHING: 0

## 2021-12-01 ASSESSMENT — PAIN SCALES - GENERAL
PAINLEVEL_OUTOF10: 0
PAINLEVEL_OUTOF10: 0

## 2021-12-01 NOTE — PROGRESS NOTES
Wasted 95mL of Fentanyl with Katelyn Challenger. Med disposed of into the Rx Destroyer per protocol.     Primary Nurse eSignature: Electronically signed by Junito Arnold RN on 11/30/21 at 7:17 PM EST    **SHARE this note so that the co-signing nurse is able to place an eSignature**    Co-signer eSignature: Electronically signed by Biju Farfan RN on 11/30/21 at 7:14 PM EST

## 2021-12-01 NOTE — PROGRESS NOTES
Hospitalist Progress Note      PCP: Solomon De Souza    Date of Admission: 11/23/2021         Chief [address] Course:   80 y.o. female with cirrhosis, etoh use who presented to [facility] with confusion/hepatic encephalopathy and GIB. Placed in ICU.         Subjective: sitting in chair, family at bedside,  aox 1 currently, pleasantly confused        Medications:  Reviewed    Infusion Medications    sodium chloride      sodium chloride       Scheduled Medications    folic acid  1 mg Oral Daily    thiamine  100 mg Oral Daily    lactulose  20 g Oral TID    midodrine  5 mg Oral TID WC    pantoprazole  40 mg IntraVENous BID    rifaximin  550 mg Oral BID    sodium chloride flush  5-40 mL IntraVENous 2 times per day     PRN Meds: sodium chloride, potassium chloride, sodium chloride, sodium chloride flush, ondansetron **OR** ondansetron, acetaminophen **OR** acetaminophen      Intake/Output Summary (Last 24 hours) at 12/1/2021 1120  Last data filed at 12/1/2021 7606  Gross per 24 hour   Intake 487.91 ml   Output 2025 ml   Net -1537.09 ml       Physical Exam Performed:    BP (!) 108/55   Pulse 96   Temp 98.2 °F (36.8 °C) (Oral)   Resp 18   Ht 5' 5\" (1.651 m)   Wt 132 lb (59.9 kg)   SpO2 100%   BMI 21.97 kg/m²     General appearance: No apparent distress, appears stated age and cooperative. Frail appearing  HEENT: Pupils equal, round, and reactive to light. Conjunctivae/corneas clear. Neck: Supple, with full range of motion. No jugular venous distention. Trachea midline. Respiratory:  improved respiratory effort. Clear to auscultation, bilaterally without Rales/Wheezes, some scattered Rhonchi anteriorly noted  Cardiovascular: Regular rate and rhythm with normal S1/S2 without murmurs, rubs or gallops. Abdomen: Soft, non-tender, non-distended with normal bowel sounds.   Musculoskeletal: No clubbing, cyanosis or edema bilaterally.  Full range of motion without deformity. Skin: Skin color, texture, turgor normal.  No rashes or lesions. Neurologic:  Neurovascularly intact without any focal sensory/motor deficits. Cranial nerves: II-XII intact, grossly non-focal.  Psychiatric: Alert and oriented x1, thought content not quite appropriate, normal insight  Capillary Refill: Brisk,3 seconds, normal   Peripheral Pulses: +2 palpable, equal bilaterally        Labs:   Recent Labs     11/29/21  0515 11/30/21  0510 12/01/21  0605   WBC 9.2 10.4 9.6   HGB 9.7* 8.7* 7.5*   HCT 29.1* 26.2* 22.7*   * 101* 97*     Recent Labs     11/30/21  0510 11/30/21  0510 11/30/21  1207 11/30/21 2038 12/01/21  0605   *  --   --  150* 150*   K 3.1*   < > 3.7 3.6 3.6   *  --   --  119* 117*   CO2 25  --   --  26 27   BUN 20  --   --  27* 27*   CREATININE <0.5*  --   --  <0.5* <0.5*   CALCIUM 7.4*  --   --  8.0* 7.9*   PHOS 2.5  --   --   --  3.3    < > = values in this interval not displayed. No results for input(s): AST, ALT, BILIDIR, BILITOT, ALKPHOS in the last 72 hours. No results for input(s): INR in the last 72 hours. No results for input(s): Maty Mould in the last 72 hours. Urinalysis:      Lab Results   Component Value Date    NITRU Negative 11/23/2021    BLOODU Negative 11/23/2021    SPECGRAV 1.015 11/23/2021    GLUCOSEU Negative 11/23/2021       Radiology:  XR ABDOMEN FOR NG/OG/NE TUBE PLACEMENT   Final Result   NG tube is in the stomach. XR CHEST PORTABLE   Final Result   Line placement without complicating feature. XR CHEST PORTABLE   Final Result   1. The endotracheal tube tip is 3 cm above the maria g. 2. Clear lungs. XR ABDOMEN (KUB) (SINGLE AP VIEW)   Final Result   Enteric tube side port projects over the expected region of the GE junction,   recommend advancement. XR ABDOMEN (KUB) (SINGLE AP VIEW)   Final Result   Enteric tube tip and side port project over the stomach.          CT ABDOMEN PELVIS W IV CONTRAST Additional Contrast? None   Final Result   1. Cirrhosis with evidence of portal hypertension including multiple upper   varices including distal esophageal and gastric varices as well as a   spontaneous splenorenal shunt. Mild third-spacing with edematous changes   throughout the abdomen and subcutaneous soft tissues. 2. Cholelithiasis with no acute features. 3. Distention with mural thickening in the duodenal sweep and proximal small   bowel. There is no evidence of obstruction. Findings may be related to a   duodenitis and enteritis. 4. Mural thickening of the distal esophagus with evidence of reflux, likely a   reflux esophagitis. CT HEAD WO CONTRAST   Final Result   Atrophy and mild small vessel ischemic disease. XR CHEST PORTABLE   Final Result   No radiographic evidence of acute pulmonary disease.                  Assessment/Plan:    Active Hospital Problems    Diagnosis     Moderate protein-calorie malnutrition ([address] [E44.0]     Acute respiratory failure with hypoxia (HCC) [J96.01]     Upper GI bleeding [K92.2]     Acute gastric ulcer with hemorrhage [K25.0]     Acute blood loss anemia [D62]     Shock circulatory (HCC) [R57.9]     Other cirrhosis of liver ([address] [K74.69]     Portal hypertension (HCC) [K76.6]     Hypernatremia [E87.0]     Hepatic encephalopathy ([address] [K72.90]               Acute Hepatic encephalopathy- with ammonia of 176 on arrival, CThead was unremarkable for bleed  -GI consulted,  -NG placed in ER, lactulose was ordered  -ppi ggt  -was on octreotide ggt  -required ICU care   -likely also component of sedation lingering and hypernatremia    Possible Resp failure- related to above,   -intubated 11/24 pm to secure airway given encephalopathy and concern for aspiration, extubated 11/29  -apprec pulm/cc mgmt  -CVC placed 11/25     Acute blood loss Anemia- with concern for coffee ground emesis and upper GIB(?variceal)  -trended h/h  -Gi on board, s/p EGD 11/24(noted nonbleeding gastric dieulafoy lesion, endoclipped)  -repeat EGD 11/27(2 endoclips placed at ulceration near site of initial clipping)  -was on ppi ggt  -2u prbc ordered in ER  -tele  -Iv rocephin started 11/23, end after 7 doses   -2u prbc 11/25     Hypernatremia-sodium of 150 on 12/1, likely from poor po intake(fluids limited given honey thick liquids)  -d5w ivfs ordered  -trended labs  -consider nephro eval if not improving     Lactic acidosis-resolved, concern related to above anemia, CTa/p without obvious pathology  -trended labs  -ivf bolus given     HTN- per emr,not on meds  -monitor VS     Transaminitis/Cirrhosis/coagulopathy- sees Angle Pimentel, apparently was taking rifaximin per emr  -started lactulose via NG on 11/24   -vit K iv ordered 11/25     DVT Prophylaxis: scd  Diet: ADULT ORAL NUTRITION SUPPLEMENT; Breakfast, Lunch, Dinner; Standard 4 oz Oral Supplement  ADULT DIET; Dysphagia - Minced and Moist; Moderately Thick (Honey);  No Drinking Straws  Code Status: Full Code    PT/OT Eval Status: rec snf on 11/30     Dispo - pending stable/improved sodium, stable h/h, ?weekend, d5w continued given sodium still at 150,, repeat BMP and h/h again this evening    Thurnell Saint, MD

## 2021-12-01 NOTE — PROGRESS NOTES
Speech Language Pathology  Facility/Department: [facility] C5 - MED SURG/ORTHO  Dysphagia Daily Treatment Note      Recommend downgrade to Dysphagia II (Minced and moist) diet with honey (moderately thick) liquids, crushable meds crushed in puree, assist feed. NAME: Deon Sloan  : 1950  MRN: [id number]    Patient Diagnosis(es):   Patient Active Problem List    Diagnosis Date Noted    Moderate protein-calorie malnutrition ([address] 2021    Acute respiratory failure with hypoxia ([address] 2021    Upper GI bleeding 2021    Acute gastric ulcer with hemorrhage 2021    Acute blood loss anemia 2021    Shock circulatory ([address] 2021    Other cirrhosis of liver ([address] 2021    Portal hypertension ([address] 2021    Hypernatremia 2021    Chronic thoracic spine pain 2020    Disc displacement, thoracic 2020    Chronic thoracic back pain 2020    Moderate malnutrition ([address] 2018    Hepatic encephalopathy ([address] 2018    Hyponatremia 2018    Acute metabolic encephalopathy     Hypokalemia 2018    QT prolongation 2018    Lactic acidosis 2018    Elevated LFTs 2018    Kyphosis of thoracic region 2018    DDD (degenerative disc disease), thoracic 2018    Scapular dyskinesis 2018     Allergies: Allergies   Allergen Reactions    Azithromycin Nausea And Vomiting    Sulfa Antibiotics Rash     Subjective: Pt seen upright in bedside chair after PT/OT session, breakfast tray present. RN OK'd SLP entry and evaluation. Pain: no c/o pain    Current Diet: ADULT ORAL NUTRITION SUPPLEMENT; Breakfast, Lunch, Dinner; Standard 4 oz Oral Supplement  ADULT DIET; Dysphagia - Minced and Moist; Moderately Thick (Honey); No Drinking Straws    Diet Tolerance:  Patient tolerating current diet level without signs/symptoms of penetration / aspiration per chart. P.O. Trials:   Thin see above  2) The patient/caregiver will demonstrate understanding of compensatory strategies for improved swallowing safety. 12/01: ongoing, needs reinforcement. 3) The patient will tolerate thin liquids without signs and symptoms of aspiration 10/10 via cup. 12/01: did not target this date    Speech/Language/Cog Goals: n/a     Recommendations:  Solid Consistency: Dysphagia II (minced and moist)  Liquid Consistency: Honey (moderately thick) liquids  Medication: Crushable meds crushed in puree    Patient/Family/Caregiver Education:  See above    Compensatory Strategies:   HOB 90* and 30\" after meals; small bites/sips; alternate solids/liquids every 3-5 bites; oral care after every meal; no straws; assist feed    Plan:    Continued Dysphagia treatment with goals per plan of care. Discharge Recommendations: per PT/OT recs    If pt discharges from hospital prior to Speech/Swallowing discharge, this note serves as tx and discharge summary.      Total Treatment Time / Charges     Time in Time out Total Time / units   Cognitive Tx         Speech Tx      Dysphagia Tx 0920 0945 26 min / 1 unit     Signature:  WALTER [facility]  Speech-language pathologist  [id number]  Speech Desk Phone: [phone]

## 2021-12-01 NOTE — PLAN OF CARE
Protect patient from fall injury by keeping bed in low position, use of non skid socks and bed alarm system. Keep belongings and call light with reach at all times and following fall protocol. Turn patient every 2 hours. Observe for red areas on skin. Check around rectal tube carefully and apply barrier cream as needed. Keep heels elbows from rubbing on bed. Assess respiratory status including lung sounds and o2 saturation. Keep strict I and 0.

## 2021-12-01 NOTE — PROGRESS NOTES
Pulmonary & Critical Care Medicine ICU Progress Note    Admit Date: 2021  PCP: Su Luevano    CC:  Respiratory failure   Events of Last 24 hours:    Earlier today remains extubated, patient does not have any significant shortness of breath, patient has some cough with some phlegm coming out which is not significant as per patient and family, patient's IV pressors were tapered to DC and patient has been maintaining hemodynamics without any pressors, patient was evaluated in speech therapist and patient is taking modified diet, patient appetite is on the lower side,,   patient was afebrile, patient had sinus rhythm on the monitor, patient has had borderline urine output overnight with cumulative fluid balance of +1.4 L, no other pertinent review of system of concern      Vitals:  Tmax:  VITALS:  BP (!) 108/55   Pulse 96   Temp 98.2 °F (36.8 °C) (Oral)   Resp 18   Ht 5' 5\" (1.651 m)   Wt 132 lb (59.9 kg)   SpO2 100%   BMI 21.97 kg/m²   24HR INTAKE/OUTPUT:    CURRENT PULSE OXIMETRY:  SpO2: 100 %  24HR PULSE OXIMETRY RANGE:  SpO2  Av.9 %  Min: 96 %  Max: 100 %        EXAM:  General: No distress on nasal cannula oxygen when seen  Eyes: PERRL. No sclera icterus. No conjunctival injection. ENT: ETT in place, no facial asymmetry or thyroid enlargement  Neck: Trachea midline. Normal thyroid. Resp: No significant wheezing, minimal basilar crackles with decreased breath sound intensity  CV: Sinus rhythm. No mumur or rub. +1 edema   GI: Non-tender. Non-distended. No masses. No organmegaly. Normal to hyperactive bowel sounds. Skin: Warm and dry. No nodule on exposed extremities. No rash on exposed extremities.   Neuro: Alert and communicative but appears to be weak        IV:   dextrose 75 mL/hr at 21 1154    sodium chloride      sodium chloride           Scheduled Meds:     folic acid  1 mg Oral Daily    thiamine  100 mg Oral Daily    lactulose  20 g Oral TID    midodrine  5 mg Oral TID   pantoprazole  40 mg IntraVENous BID    rifaximin  550 mg Oral BID    sodium chloride flush  5-40 mL IntraVENous 2 times per day         Diet: ADULT ORAL NUTRITION SUPPLEMENT; Breakfast, Lunch, Dinner; Standard 4 oz Oral Supplement  ADULT DIET; Dysphagia - Minced and Moist; Moderately Thick (Honey); No Drinking Straws     Results:  CBC:   Recent Labs     11/29/21  0515 11/30/21  0510 12/01/21  0605   WBC 9.2 10.4 9.6   HGB 9.7* 8.7* 7.5*   HCT 29.1* 26.2* 22.7*   MCV 85.4 86.4 86.6   * 101* 97*     BMP:   Recent Labs     11/30/21  0510 11/30/21  0510 11/30/21  1207 11/30/21 2038 12/01/21  0605   *  --   --  150* 150*   K 3.1*   < > 3.7 3.6 3.6   *  --   --  119* 117*   CO2 25  --   --  26 27   PHOS 2.5  --   --   --  3.3   BUN 20  --   --  27* 27*   CREATININE <0.5*  --   --  <0.5* <0.5*    < > = values in this interval not displayed. Results for Saira Burns" (MRN [id number]) as of 12/1/2021 14:13   Ref. Range 11/30/2021 20:38 12/1/2021 06:05   Sodium Latest Ref Range: 136 - 145 mmol/L 150 (H) 150 (H)   Potassium Latest Ref Range: 3.5 - 5.1 mmol/L 3.6 3.6   Chloride Latest Ref Range: 99 - 110 mmol/L 119 (H) 117 (H)   CO2 Latest Ref Range: 21 - 32 mmol/L 26 27   BUN Latest Ref Range: 7 - 20 mg/dL 27 (H) 27 (H)   Creatinine Latest Ref Range: 0.6 - 1.2 mg/dL <0.5 (L) <0.5 (L)   Anion Gap Latest Ref Range: 3 - 16  5 6   GFR Non- Latest Ref Range: >60  >60 >60   GFR  Latest Ref Range: >60  >60 >60   Magnesium Latest Ref Range: 1.80 - 2.40 mg/dL  2.00   Glucose Latest Ref Range: 70 - 99 mg/dL 123 (H) 117 (H)   Calcium Latest Ref Range: 8.3 - 10.6 mg/dL 8.0 (L) 7.9 (L)   Phosphorus Latest Ref Range: 2.5 - 4.9 mg/dL  3.3   Albumin Latest Ref Range: 3.4 - 5.0 g/dL  2.0 (L)     Results for [facility] \"BHAVIK\" (MRN [id number]) as of 12/1/2021 14:13   Ref.  Range 11/28/2021 07:40 11/29/2021 05:15 11/30/2021 05:10 12/1/2021 06:05   WBC Latest Ref Range: 4.0 - 11.0 K/uL 9.7 9.2 10.4 9.6   RBC Latest Ref Range: 4.00 - 5.20 M/uL 3.59 (L) 3.40 (L) 3.04 (L) 2.62 (L)   Hemoglobin Quant Latest Ref Range: 12.0 - 16.0 g/dL 10.3 (L) 9.7 (L) 8.7 (L) 7.5 (L)   Hematocrit Latest Ref Range: 36.0 - 48.0 % 31.0 (L) 29.1 (L) 26.2 (L) 22.7 (L)   MCV Latest Ref Range: 80.0 - 100.0 fL 86.3 85.4 86.4 86.6   MCH Latest Ref Range: 26.0 - 34.0 pg 28.5 28.4 28.8 28.4   MCHC Latest Ref Range: 31.0 - 36.0 g/dL 33.1 33.3 33.3 32.9   MPV Latest Ref Range: 5.0 - 10.5 fL 7.4 7.6 7.9 8.2   RDW Latest Ref Range: 12.4 - 15.4 % 18.9 (H) 19.1 (H) 19.4 (H) 20.0 (H)   Platelet Count Latest Ref Range: 135 - 450 K/uL 106 (L) 101 (L) 101 (L) 97 (L)   Neutrophils % Latest Units: % 70.1 75.1 72.0 66.0   Lymphocyte % Latest Units: % 13.9 10.5 13.1 20.0   Monocytes % Latest Units: % 11.7 10.8 12.6 10.9   Eosinophils % Latest Units: % 3.3 2.9 1.5 2.1     Assessment/Plan:      Active Problems:    Hepatic encephalopathy (HCC)    Acute respiratory failure with hypoxia (HCC)    Upper GI bleeding    Acute gastric ulcer with hemorrhage    Acute blood loss anemia    Shock circulatory (HCC)    Other cirrhosis of liver (HCC)    Portal hypertension (HCC)    Hypernatremia  Moderate protein calorie malnutrition  Resolved Problems:    * No resolved hospital problems.  *      Oxygen supplementation to keep saturation between 90-94% only  Patient was on 2 L of nasal cannula with saturation 100%-please taper down the oxygen as per above parameters  Pulmonary toilet  Status post upper GI endoscopy  Status post packed RBC transfusion  Monitor H&H and platelets closely-patient's platelet counts are stable but patient has drop in hemoglobin hematocrit  Monitor for any persisting GI bleed which may need further evaluation and management as per GI  Monitor hemodynamics closely off the pressors  Midodrine to continue  Off antibiotics  Lactulose and rifaximin to continue  Monitor input output and BMP  Correct electrolytes on whenever necessary basis  Patient has been started on D5W by IM per hypernatremia  Promote oral diet and supplements  Trend the ammonia levels  SCDs  IV Protonix to continue  PT/OT-patient does not appear to have debility    Case discussed with patient and family    No other recommendations from pulmonary/critical care standpoint of view-please monitor closely for any further decompensation especially in view of H&H drop-please call whenever on necessary basis    Mich Mcmullen MD, MD

## 2021-12-01 NOTE — PROGRESS NOTES
Physical Therapy  Facility/Department: [facility] C5 - MED SURG/ORTHO  Daily Treatment Note  NAME: Jama Sanchez  : 1950  MRN: [id number]    Date of Service: 2021    Discharge Recommendations:  Subacute/Skilled Nursing Facility   PT Equipment Recommendations  Equipment Needed: No  Other: defer    Assessment   Body structures, Functions, Activity limitations: Decreased functional mobility ; Decreased balance; Decreased posture; Decreased strength; Decreased safe awareness; Decreased cognition; Decreased endurance; Decreased coordination  Assessment: Pt is 81 yo female who presents with diagnosis of hepatic encephalopathy. Pt intubated -. Indep with mobility at baseline. Max A x 2 for bed mobility, STS at walker and in stedy. Unable to fully stand d/t inability to fully extend hips and knees. Stedy used bed to chair. Pt limited d/t impaired strength, activity tolerance, balance, and cognition. Pt would benefit from continued skilled therapy to address deficits. Recommend SNF at d/c due to current deficits and Ax2 for mobility. Treatment Diagnosis: impaired functional mobility  Specific instructions for Next Treatment: progress mobility as tolerated  Prognosis: Good  Decision Making: High Complexity  PT Education: Goals; General Safety; Gait Training; PT Role; Disease Specific Education; Plan of Care; Functional Mobility Training; Transfer Training  Barriers to Learning: cognition  Activity Tolerance  Activity Tolerance: Patient Tolerated treatment well; Patient limited by fatigue; Patient limited by endurance; Patient limited by cognitive status  Activity Tolerance:   Vitals:    21    BP: (!) 108/55   Pulse: 96   Resp:    Temp:    SpO2: 100%         Patient Diagnosis(es): The primary encounter diagnosis was Hepatic encephalopathy ([facility] [address] A diagnosis of Anemia, unspecified type was also pertinent to this visit. has a past medical history of Esophagitis and Hypertension.    has a past surgical history that includes endoscopic ultrasound (upper); Upper gastrointestinal endoscopy (N/A, 11/24/2021); and Upper gastrointestinal endoscopy (N/A, 11/27/2021). Restrictions  Restrictions/Precautions  Restrictions/Precautions: General Precautions, Fall Risk  Position Activity Restriction  Other position/activity restrictions: O2, IV,, boyd, rectal tube  Subjective   General  Chart Reviewed: Yes  Additional Pertinent Hx: intubated 11/24-11/29  Response To Previous Treatment: Patient with no complaints from previous session. Family / Caregiver Present: Yes (spouse arrived at session end)  Referring Practitioner: BRANDYN Goss  Subjective  Subjective: pt agreeable to therapy  General Comment  Comments: Pt resting in bed on approach; RN cleared pt for therapy  Pain Screening  Patient Currently in Pain: Denies  Vital Signs  Patient Currently in Pain: Denies       Orientation  Orientation  Overall Orientation Status: Impaired  Orientation Level: Oriented to person; Disoriented to time; Disoriented to place; Disoriented to situation  Cognition  Overall Cognitive Status: Exceptions  Arousal/Alertness: Delayed responses to stimuli  Following Commands: Inconsistently follows commands  Attention Span: Attends with cues to redirect;  Difficulty attending to directions  Memory: Decreased short term memory; Decreased recall of biographical Information; Decreased recall of recent events  Safety Judgement: Decreased awareness of need for safety; Decreased awareness of need for assistance  Problem Solving: Decreased awareness of errors; Assistance required to identify errors made; Assistance required to implement solutions  Insights: Not aware of deficits  Initiation: Requires cues for all  Sequencing: Requires cues for all     Objective   Bed mobility  Supine to Sit: Dependent/Total; 2 Person assistance; Maximum assistance (to pts L)  Sit to Supine: Unable to assess (remained up)  Transfers  Sit to Stand: Dependent/Total; 2 Person Assistance; Maximum Assistance (stedy)  Stand to sit: Dependent/Total; 2 Person Assistance; Maximum Assistance (stedy)  Bed to Chair: Dependent/Total (stedy)  Ambulation  Ambulation?: No (trialed standing in walker X 20 seconds max A of 2 , pt unable to initiate steps.)     Balance  Sitting - Static: Fair; -  Sitting - Dynamic: Poor  Standing - Static: Poor  Standing - Dynamic: Poor  Comments: min-mod A for sitting balance at EOB d/t right lateral lean. Pt unable to correct without assist    Exercises  Hip Flexion: seated marches X 10 BLE with tactile cues to complete  Hip Abduction: seated clamshells X 10 BLE tactile cues to complete  Knee Long Arc Quad: X 10 BLE tactile cues to complete  Ankle Pumps: X 15 BLE PROM, pt very tight in heel cords        AM-PAC Score  AM-PAC Inpatient Mobility Raw Score : 9 (12/01/21 1036)  AM-PAC Inpatient T-Scale Score : 30.55 (12/01/21 1036)  Mobility Inpatient CMS 0-100% Score: 81.38 (12/01/21 1036)  Mobility Inpatient CMS G-Code Modifier : CM (12/01/21 1036)          Goals  Short term goals  Time Frame for Short term goals: 1 week (12/7) unless otherwise specified  Short term goal 1: Pt will be mod A with bed mobility. ; 12/1 max Aof 2  Short term goal 2: Pt will be mod A with bed to chair transfers.; 12/1 dep with stedy  Short term goal 3: Pt will ambulate 15 ft with mod A x 2 and LRAD.; 12/1 unable to take steps with walker  Short term goal 4: 12/04: Pt will participate in 12-15 reps of BLE exercises to promote strength and activity tolerance.; 12/1 intiated  Patient Goals   Patient goals : \"to get better and go home\"    Plan    Plan  Times per week: 3-5x/wk  Times per day: Daily  Specific instructions for Next Treatment: progress mobility as tolerated  Current Treatment Recommendations: Strengthening, Neuromuscular Re-education, Home Exercise Program, Safety Education & Training, Balance Training, Endurance Training, Functional Mobility Training, Transfer Training, Gait Training, Equipment Evaluation, Education, & procurement, Patient/Caregiver Education & Training  Safety Devices  Type of devices:  All fall risk precautions in place, Call light within reach, Chair alarm in place, Gait belt, Left in chair, Nurse notified     Therapy Time   Individual Concurrent Group Co-treatment   Time In 0841         Time Out 0920         Minutes [facility],[id number]

## 2021-12-01 NOTE — PROGRESS NOTES
Occupational Therapy  Facility/Department: [facility] C5 - MED SURG/ORTHO  Daily Treatment Note  NAME: Siva Law  : 1950  MRN: [id number]    Date of Service: 2021    Discharge Recommendations:  Subacute/Skilled Nursing Facility       Assessment   Performance deficits / Impairments: Decreased functional mobility ; Decreased ADL status; Decreased ROM; Decreased safe awareness; Decreased cognition; Decreased balance; Decreased endurance; Decreased high-level IADLs; Decreased fine motor control; Decreased coordination; Decreased posture; Decreased strength  Assessment: Pt with fair tolerance of OT treatment. Pt pleasant and verbalizes understanding of commands however demos minimal to no initiation of activity. Pt requiring Ax2 for all bed mobility and transfers with Marcia Estrella. Pt max A to depenent for all ADLs. Pt is functioning well below her baseline, very limited by cognitive status. Co-tx collaboration this date with PT staff to safely progress pt toward goals. Pt will have better occupational performance outcomes within a co-treatment than 1:1 session. Pt would benefit from continued skilled OT in SNF setting at d/c. Prognosis: Fair  OT Education: OT Role; Plan of Care; Transfer Training; Orientation; ADL Adaptive Strategies  Disease Specific Education: Pt educated on importance of OOB mobility, prevention of complications of bedrest, and general safety during hospitalization. Pt does not verbalize understanding and would benefit from reinforcement.    Barriers to Learning: cognition  REQUIRES OT FOLLOW UP: Yes  Activity Tolerance  Activity Tolerance: Treatment limited secondary to decreased cognition  Activity Tolerance: Vitals: BP= 108/55, HR= 96, SPO2= 99%  Safety Devices  Safety Devices in place: Yes  Type of devices: Left in chair; Chair alarm in place; Call light within reach; Nurse notified; Gait belt         Patient Diagnosis(es): The primary encounter diagnosis was Hepatic encephalopathy ([facility] A diagnosis of Anemia, unspecified type was also pertinent to this visit. has a past medical history of Esophagitis and Hypertension. has a past surgical history that includes endoscopic ultrasound (upper); Upper gastrointestinal endoscopy (N/A, 2021); and Upper gastrointestinal endoscopy (N/A, 2021). Restrictions  Restrictions/Precautions  Restrictions/Precautions: General Precautions, Fall Risk  Position Activity Restriction  Other position/activity restrictions: O2, IV, oley, rectal tube     Subjective   General  Chart Reviewed: Yes  Patient assessed for rehabilitation services?: Yes  Additional Pertinent Hx: Per chart , \"79 y.o. female with cirrhosis, etoh use who presented to [facility] with confusion. Pt was found by  today this morning around 6-7am, standing up between the couch and coffee table. He recalls hearing a 'crash' this morning prompting him to come down to check on her. She was INcoherent this am.  He believes she still drinks but is unsure(he works 12 hour shifts). She was relatively normal last night but noted to be more fatigued/tired and sleepy than usual. She has been off her meds and has hx of poor compliance with follow up. \"  Response to previous treatment: Patient with no complaints from previous session  Family / Caregiver Present: No (spouse arriving at end of session)  Referring Practitioner: BEBO Turcios CNP  Diagnosis: Hepatic encephalopathy, Esophagogastroduodenoscopy (, ), Intubated -    Subjective  Subjective: Pt resting in bed, agreeable to OT treatment. Pt pleasantly confused/disoriented. Vital Signs  Patient Currently in Pain: Denies     Orientation  Orientation  Overall Orientation Status: Impaired  Orientation Level: Oriented to person; Disoriented to place;  Disoriented to situation; Disoriented to time (pt able to state name only not )     Objective    ADL  Feeding: Maximum assistance; Setup; Verbal cueing; Increased time to complete; Thickened liquids; Scoop assist; Bringing food to mouth assist; Beverage management  Toileting: Dependent/Total (boyd and rectal tube)     Balance  Sitting Balance: Moderate assistance (varies from CGA-mod A, leaning to R and posterior)  Standing Balance: Dependent/Total (max A of 2 for partial stand on Janet STARK)  Standing Balance  Activity: bed to chair with Roxan Heimlich STEDY    Bed mobility  Supine to Sit: Dependent/Total; 2 Person assistance (max A of 2 to pt L)     Transfers  Sit to stand: Dependent/Total; 2 Person assistance  Stand to sit: Dependent/Total; 2 Person assistance  Transfer Comments: Pt required max A of 2 and Yelena Wiggins for bed to chair transfer. Cognition  Overall Cognitive Status: Exceptions  Arousal/Alertness: Delayed responses to stimuli; Inconsistent responses to stimuli  Following Commands: Inconsistently follows commands  Attention Span: Attends with cues to redirect;  Difficulty attending to directions  Memory: Decreased short term memory; Decreased recall of biographical Information; Decreased recall of recent events  Safety Judgement: Decreased awareness of need for safety; Decreased awareness of need for assistance  Problem Solving: Decreased awareness of errors; Assistance required to identify errors made; Assistance required to implement solutions  Insights: Not aware of deficits  Initiation: Requires cues for all  Sequencing: Requires cues for all     Plan   Plan  Times per week: 3-5x/week  Current Treatment Recommendations: Strengthening, Balance Training, Functional Mobility Training, Endurance Training, Cognitive Reorientation, Safety Education & Training, Patient/Caregiver Education & Training, Self-Care / ADL, Positioning    AM-PAC Score  AM-PAC Inpatient Daily Activity Raw Score: 6 (12/01/21 1157)  AM-PAC Inpatient ADL T-Scale Score : 17.07 (12/01/21 1157)  ADL Inpatient CMS 0-100% Score: 100 (12/01/21 1157)  ADL Inpatient CMS G-Code Modifier : CN (12/01/21 1157)    Goals  Short term goals  Time Frame for Short term goals: 1 week (12/6) unless stated otherwise  Short term goal 1: Pt will LB dress with mod A.-- ongoing 12/1/21  Short term goal 2: Pt will perform at least 2 simple ADLS with min A (97/1). -- ongoing 12/1/21  Short term goal 3: Pt will sit EOB with min A to assist with ADLS. -- ongoing 12/1/21  Short term goal 4: Pt will UB dress with mod A-- ongoing 12/1/21  Patient Goals   Patient goals : did not verbalized but motivated to put on socks.        Therapy Time   Individual Concurrent Group Co-treatment   Time In 0840         Time Out 0920         Minutes [address], FINLEY/L

## 2021-12-02 LAB
ALBUMIN SERPL-MCNC: 2.1 G/DL (ref 3.4–5)
AMMONIA: 46 UMOL/L (ref 11–51)
ANION GAP SERPL CALCULATED.3IONS-SCNC: 6 MMOL/L (ref 3–16)
BASOPHILS ABSOLUTE: 0.1 K/UL (ref 0–0.2)
BASOPHILS RELATIVE PERCENT: 1.1 %
BUN BLDV-MCNC: 25 MG/DL (ref 7–20)
CALCIUM SERPL-MCNC: 7.6 MG/DL (ref 8.3–10.6)
CHLORIDE BLD-SCNC: 111 MMOL/L (ref 99–110)
CO2: 25 MMOL/L (ref 21–32)
CREAT SERPL-MCNC: <0.5 MG/DL (ref 0.6–1.2)
EOSINOPHILS ABSOLUTE: 0.2 K/UL (ref 0–0.6)
EOSINOPHILS RELATIVE PERCENT: 2 %
GFR AFRICAN AMERICAN: >60
GFR NON-AFRICAN AMERICAN: >60
GLUCOSE BLD-MCNC: 96 MG/DL (ref 70–99)
HCT VFR BLD CALC: 22.2 % (ref 36–48)
HEMOGLOBIN: 7.4 G/DL (ref 12–16)
LYMPHOCYTES ABSOLUTE: 1.8 K/UL (ref 1–5.1)
LYMPHOCYTES RELATIVE PERCENT: 18.9 %
MAGNESIUM: 1.8 MG/DL (ref 1.8–2.4)
MCH RBC QN AUTO: 29.9 PG (ref 26–34)
MCHC RBC AUTO-ENTMCNC: 33.3 G/DL (ref 31–36)
MCV RBC AUTO: 89.7 FL (ref 80–100)
MONOCYTES ABSOLUTE: 0.8 K/UL (ref 0–1.3)
MONOCYTES RELATIVE PERCENT: 8.4 %
NEUTROPHILS ABSOLUTE: 6.6 K/UL (ref 1.7–7.7)
NEUTROPHILS RELATIVE PERCENT: 69.6 %
PDW BLD-RTO: 21.4 % (ref 12.4–15.4)
PHOSPHORUS: 3.3 MG/DL (ref 2.5–4.9)
PLATELET # BLD: 106 K/UL (ref 135–450)
PMV BLD AUTO: 7.5 FL (ref 5–10.5)
POTASSIUM SERPL-SCNC: 3.3 MMOL/L (ref 3.5–5.1)
RBC # BLD: 2.47 M/UL (ref 4–5.2)
SODIUM BLD-SCNC: 142 MMOL/L (ref 136–145)
WBC # BLD: 9.5 K/UL (ref 4–11)

## 2021-12-02 PROCEDURE — 1200000000 HC SEMI PRIVATE

## 2021-12-02 PROCEDURE — 97530 THERAPEUTIC ACTIVITIES: CPT

## 2021-12-02 PROCEDURE — 97535 SELF CARE MNGMENT TRAINING: CPT

## 2021-12-02 PROCEDURE — 82140 ASSAY OF AMMONIA: CPT

## 2021-12-02 PROCEDURE — 6360000002 HC RX W HCPCS: Performed by: INTERNAL MEDICINE

## 2021-12-02 PROCEDURE — 6370000000 HC RX 637 (ALT 250 FOR IP): Performed by: INTERNAL MEDICINE

## 2021-12-02 PROCEDURE — 97110 THERAPEUTIC EXERCISES: CPT

## 2021-12-02 PROCEDURE — 83735 ASSAY OF MAGNESIUM: CPT

## 2021-12-02 PROCEDURE — 92526 ORAL FUNCTION THERAPY: CPT

## 2021-12-02 PROCEDURE — 6370000000 HC RX 637 (ALT 250 FOR IP): Performed by: NURSE PRACTITIONER

## 2021-12-02 PROCEDURE — 2580000003 HC RX 258: Performed by: INTERNAL MEDICINE

## 2021-12-02 PROCEDURE — C9113 INJ PANTOPRAZOLE SODIUM, VIA: HCPCS | Performed by: INTERNAL MEDICINE

## 2021-12-02 PROCEDURE — 80069 RENAL FUNCTION PANEL: CPT

## 2021-12-02 PROCEDURE — 85025 COMPLETE CBC W/AUTO DIFF WBC: CPT

## 2021-12-02 RX ORDER — LACTULOSE 10 G/15ML
10 SOLUTION ORAL 3 TIMES DAILY
Status: DISCONTINUED | OUTPATIENT
Start: 2021-12-02 | End: 2021-12-04 | Stop reason: HOSPADM

## 2021-12-02 RX ADMIN — RIFAXIMIN 550 MG: 550 TABLET ORAL at 22:01

## 2021-12-02 RX ADMIN — RIFAXIMIN 550 MG: 550 TABLET ORAL at 08:48

## 2021-12-02 RX ADMIN — FOLIC ACID 1 MG: 1 TABLET ORAL at 08:48

## 2021-12-02 RX ADMIN — Medication 100 MG: at 08:48

## 2021-12-02 RX ADMIN — SODIUM CHLORIDE, PRESERVATIVE FREE 10 ML: 5 INJECTION INTRAVENOUS at 08:49

## 2021-12-02 RX ADMIN — LACTULOSE 20 G: 20 SOLUTION ORAL at 08:48

## 2021-12-02 RX ADMIN — PANTOPRAZOLE SODIUM 40 MG: 40 INJECTION, POWDER, FOR SOLUTION INTRAVENOUS at 08:49

## 2021-12-02 RX ADMIN — SODIUM CHLORIDE, PRESERVATIVE FREE 10 ML: 5 INJECTION INTRAVENOUS at 22:02

## 2021-12-02 RX ADMIN — MIDODRINE HYDROCHLORIDE 5 MG: 5 TABLET ORAL at 18:38

## 2021-12-02 RX ADMIN — MIDODRINE HYDROCHLORIDE 5 MG: 5 TABLET ORAL at 08:49

## 2021-12-02 RX ADMIN — PANTOPRAZOLE SODIUM 40 MG: 40 INJECTION, POWDER, FOR SOLUTION INTRAVENOUS at 22:01

## 2021-12-02 RX ADMIN — LACTULOSE 10 G: 20 SOLUTION ORAL at 22:01

## 2021-12-02 RX ADMIN — MIDODRINE HYDROCHLORIDE 5 MG: 5 TABLET ORAL at 12:35

## 2021-12-02 RX ADMIN — POTASSIUM CHLORIDE 20 MEQ: 400 INJECTION, SOLUTION INTRAVENOUS at 12:35

## 2021-12-02 RX ADMIN — POTASSIUM CHLORIDE 20 MEQ: 400 INJECTION, SOLUTION INTRAVENOUS at 11:29

## 2021-12-02 ASSESSMENT — PAIN SCALES - GENERAL
PAINLEVEL_OUTOF10: 0

## 2021-12-02 NOTE — PROGRESS NOTES
Progress Note  Date:2021       Room:0548/0548-01  Patient Zoltan Figueroa     YOB: 1950     Age:71 y.o. Subjective    Subjective:  Symptoms:  Stable. Pain:  She reports no pain. Review of Systems  Objective         Vitals Last 24 Hours:  TEMPERATURE:  Temp  Av.1 °F (36.7 °C)  Min: 97.8 °F (36.6 °C)  Max: 98.3 °F (36.8 °C)  RESPIRATIONS RANGE: Resp  Av.2  Min: 16  Max: 18  PULSE OXIMETRY RANGE: SpO2  Av.2 %  Min: 93 %  Max: 100 %  PULSE RANGE: Pulse  Av.4  Min: 90  Max: 98  BLOOD PRESSURE RANGE: Systolic (42UKM), PTQ:150 , Min:108 , MAP:631   ; Diastolic (46DRW), HVB:40, Min:50, Max:66    I/O (24Hr): Intake/Output Summary (Last 24 hours) at 2021 0521  Last data filed at 2021 1803  Gross per 24 hour   Intake 250 ml   Output 300 ml   Net -50 ml     Objective:  General Appearance:  Not in pain. Vital signs: (most recent): Blood pressure (!) 125/50, pulse 96, temperature 98.2 °F (36.8 °C), temperature source Oral, resp. rate 16, height 5' 5\" (1.651 m), weight 132 lb (59.9 kg), SpO2 93 %. Abdomen: Abdomen is soft. Bowel sounds are normal.   There is no abdominal tenderness. Labs/Imaging/Diagnostics    Labs:  CBC:  Recent Labs     21  0510 21  0605 21  1714   WBC 10.4 9.6  --    RBC 3.04* 2.62*  --    HGB 8.7* 7.5* 7.7*   HCT 26.2* 22.7* 23.4*   MCV 86.4 86.6  --    RDW 19.4* 20.0*  --    * 97*  --      CHEMISTRIES:  Recent Labs     21  0510 21  1207 21  0605 21  1714   *  --  150* 150* 144   K 3.1*   < > 3.6 3.6 3.5   *  --  119* 117* 113*   CO2 25  --  26 27 27   BUN 20  --  27* 27* 27*   CREATININE <0.5*  --  <0.5* <0.5* <0.5*   GLUCOSE 107*  --  123* 117* 118*   PHOS 2.5  --   --  3.3  --    MG 1.80  --   --  2.00  --     < > = values in this interval not displayed. PT/INR:No results for input(s): PROTIME, INR in the last 72 hours.   APTT:No results for input(s): APTT in the last 72 hours. LIVER PROFILE:No results for input(s): AST, ALT, BILIDIR, BILITOT, ALKPHOS in the last 72 hours. Imaging Last 24 Hours:  No results found. Assessment//Plan           Hospital Problems           Last Modified POA    Hepatic encephalopathy ([address] 11/23/2021 Yes    Acute respiratory failure with hypoxia ([address] 11/29/2021 Yes    Upper GI bleeding 11/29/2021 Yes    Acute gastric ulcer with hemorrhage 11/29/2021 Yes    Acute blood loss anemia 11/29/2021 Yes    Shock circulatory ([address] 11/29/2021 Yes    Other cirrhosis of liver ([address] 11/29/2021 Yes    Portal hypertension ([address] 11/29/2021 Yes    Hypernatremia 11/29/2021 Yes    Moderate protein-calorie malnutrition ([address] 11/30/2021 Yes        Assessment & Plan  71-year-old female with history of alcoholic cirrhosis admitted with encephalopathy and GI bleed. EGD showed no varices but Dieulafoy lesion in proximal stomach which was clipped. H/H is stable around 8/23, and has brown stools.     Plan:   1. Supportive care  2. Continue Protonix  3. F/U H/H and transfuse as appropriate  4.  Will follow   Michoacano Mckinney MD       Onita Mayhew) [phone]      Electronically signed by Jass Dodge MD on 11/30/21 at 11:01 AM EST

## 2021-12-02 NOTE — CARE COORDINATION
CM LVM with spouse X2 for SNF choices. Left contact information in room as well. CM following-Candace Valdez, RN       ADDENDUM 1031: spoke to spouse at bedside and he would like referral to [address] [place] [address]. Referral's faxed. CM following-Candacelola Valdez RN       ADDENDUM 3104: Spoke to Alison Garcia with [facility] and they can accept pt once medically stable for discharge, no barriers will need rapid covid done day of DC.  Julee Guerrero RN

## 2021-12-02 NOTE — PROGRESS NOTES
Progress Note  Date:2021       Room:0548/0548-01  Patient Roxie Son     YOB: 1950     Age:71 y.o. Subjective    Subjective:  Symptoms:  Stable. Pain:  She reports no pain. Review of Systems  Objective         Vitals Last 24 Hours:  TEMPERATURE:  Temp  Av.1 °F (36.7 °C)  Min: 97.8 °F (36.6 °C)  Max: 98.3 °F (36.8 °C)  RESPIRATIONS RANGE: Resp  Av.2  Min: 16  Max: 18  PULSE OXIMETRY RANGE: SpO2  Av.2 %  Min: 93 %  Max: 100 %  PULSE RANGE: Pulse  Av.4  Min: 90  Max: 98  BLOOD PRESSURE RANGE: Systolic (70WRS), WEW:998 , Min:108 , OYH:894   ; Diastolic (71FUA), RUC:40, Min:50, Max:66    I/O (24Hr): Intake/Output Summary (Last 24 hours) at 2021 0522  Last data filed at 2021 1803  Gross per 24 hour   Intake 250 ml   Output 300 ml   Net -50 ml     Objective:  General Appearance:  Not in pain. Vital signs: (most recent): Blood pressure (!) 125/50, pulse 96, temperature 98.2 °F (36.8 °C), temperature source Oral, resp. rate 16, height 5' 5\" (1.651 m), weight 132 lb (59.9 kg), SpO2 93 %. Abdomen: Abdomen is soft. Bowel sounds are normal.   There is no abdominal tenderness. Labs/Imaging/Diagnostics    Labs:  CBC:  Recent Labs     21  0510 21  0605 21  1714   WBC 10.4 9.6  --    RBC 3.04* 2.62*  --    HGB 8.7* 7.5* 7.7*   HCT 26.2* 22.7* 23.4*   MCV 86.4 86.6  --    RDW 19.4* 20.0*  --    * 97*  --      CHEMISTRIES:  Recent Labs     21  0510 21  1207 21  0605 21  1714   *  --  150* 150* 144   K 3.1*   < > 3.6 3.6 3.5   *  --  119* 117* 113*   CO2 25  --  26 27 27   BUN 20  --  27* 27* 27*   CREATININE <0.5*  --  <0.5* <0.5* <0.5*   GLUCOSE 107*  --  123* 117* 118*   PHOS 2.5  --   --  3.3  --    MG 1.80  --   --  2.00  --     < > = values in this interval not displayed. PT/INR:No results for input(s): PROTIME, INR in the last 72 hours.   APTT:No results for input(s): APTT in the last 72 hours. LIVER PROFILE:No results for input(s): AST, ALT, BILIDIR, BILITOT, ALKPHOS in the last 72 hours. Imaging Last 24 Hours:  No results found. Assessment//Plan           Hospital Problems           Last Modified POA    Hepatic encephalopathy ([address] 11/23/2021 Yes    Acute respiratory failure with hypoxia ([address] 11/29/2021 Yes    Upper GI bleeding 11/29/2021 Yes    Acute gastric ulcer with hemorrhage 11/29/2021 Yes    Acute blood loss anemia 11/29/2021 Yes    Shock circulatory ([address] 11/29/2021 Yes    Other cirrhosis of liver ([address] 11/29/2021 Yes    Portal hypertension ([address] 11/29/2021 Yes    Hypernatremia 11/29/2021 Yes    Moderate protein-calorie malnutrition ([address] 11/30/2021 Yes        Assessment & Plan  67-year-old female with history of alcoholic cirrhosis admitted with encephalopathy and GI bleed. EGD showed no varices but Dieulafoy lesion in proximal stomach which was clipped. H/H is stable around 8/23, and has brown stools.     Plan:   1. Supportive care  2. Continue Protonix  3. F/U H/H and transfuse as appropriate  4.  Will follow   Carola Torres MD       [place] ) [phone]      Electronically signed by Alfred Gomes MD on 11/30/21 at 11:01 AM EST

## 2021-12-02 NOTE — PROGRESS NOTES
Hospitalist Progress Note      PCP: Gopi Carvajal    Date of Admission: 11/23/2021    Chief Complaint: confusion        Subjective:  She remains confused. Comfortable. No complaints.          Medications:  Reviewed    Infusion Medications    sodium chloride      sodium chloride       Scheduled Medications    folic acid  1 mg Oral Daily    thiamine  100 mg Oral Daily    lactulose  20 g Oral TID    midodrine  5 mg Oral TID WC    pantoprazole  40 mg IntraVENous BID    rifaximin  550 mg Oral BID    sodium chloride flush  5-40 mL IntraVENous 2 times per day     PRN Meds: sodium chloride, potassium chloride, sodium chloride, sodium chloride flush, ondansetron **OR** ondansetron, acetaminophen **OR** acetaminophen      Intake/Output Summary (Last 24 hours) at 12/2/2021 1614  Last data filed at 12/2/2021 1447  Gross per 24 hour   Intake --   Output 425 ml   Net -425 ml       Physical Exam Performed:    BP (!) 118/56   Pulse 102   Temp 98.2 °F (36.8 °C) (Axillary)   Resp 16   Ht 5' 5\" (1.651 m)   Wt 132 lb (59.9 kg)   SpO2 94%   BMI 21.97 kg/m²     General appearance: No apparent distress, appears stated age. Frail, appears chronically ill. HEENT: Pupils equal, round, and reactive to light. Conjunctivae/corneas clear. Neck: Supple, with full range of motion. No jugular venous distention. Trachea midline. Respiratory:normal respiratory effort. Clear to auscultation, bilaterally without Rales/Wheezes/rhonchi. Cardiovascular: Regular rate and rhythm with normal S1/S2 without murmurs, rubs or gallops. Abdomen: Soft, non-tender, non-distended with normal bowel sounds. Musculoskeletal: No clubbing, cyanosis or edema bilaterally.  Full range of motion without deformity. Skin: Skin color, texture, turgor normal.  No rashes or lesions. Neurologic:  Neurovascularly intact without any focal sensory/motor deficits.  Cranial nerves: II-XII intact, grossly non-focal.  Psychiatric:  Alert, oriented to self only, confused, calm and cooperative  Capillary Refill: Brisk,3 seconds, normal   Peripheral Pulses: +2 palpable, equal bilaterally        Labs:   Recent Labs     11/30/21  0510 11/30/21  0510 12/01/21  0605 12/01/21  1714 12/02/21  0620   WBC 10.4  --  9.6  --  9.5   HGB 8.7*   < > 7.5* 7.7* 7.4*   HCT 26.2*   < > 22.7* 23.4* 22.2*   *  --  97*  --  106*    < > = values in this interval not displayed. Recent Labs     11/30/21  0510 11/30/21  1207 12/01/21  0605 12/01/21  1714 12/02/21  0620   *   < > 150* 144 142   K 3.1*   < > 3.6 3.5 3.3*   *   < > 117* 113* 111*   CO2 25   < > 27 27 25   BUN 20   < > 27* 27* 25*   CREATININE <0.5*   < > <0.5* <0.5* <0.5*   CALCIUM 7.4*   < > 7.9* 8.0* 7.6*   PHOS 2.5  --  3.3  --  3.3    < > = values in this interval not displayed. No results for input(s): AST, ALT, BILIDIR, BILITOT, ALKPHOS in the last 72 hours. No results for input(s): INR in the last 72 hours. No results for input(s): Vandana Shanks in the last 72 hours. Urinalysis:      Lab Results   Component Value Date    NITRU Negative 11/23/2021    BLOODU Negative 11/23/2021    SPECGRAV 1.015 11/23/2021    GLUCOSEU Negative 11/23/2021       Radiology:  XR ABDOMEN FOR NG/OG/NE TUBE PLACEMENT   Final Result   NG tube is in the stomach. XR CHEST PORTABLE   Final Result   Line placement without complicating feature. XR CHEST PORTABLE   Final Result   1. The endotracheal tube tip is 3 cm above the maria g. 2. Clear lungs. XR ABDOMEN (KUB) (SINGLE AP VIEW)   Final Result   Enteric tube side port projects over the expected region of the GE junction,   recommend advancement. XR ABDOMEN (KUB) (SINGLE AP VIEW)   Final Result   Enteric tube tip and side port project over the stomach. CT ABDOMEN PELVIS W IV CONTRAST Additional Contrast? None   Final Result   1.  Cirrhosis with evidence of portal hypertension including multiple upper   varices including distal esophageal and gastric varices as well as a   spontaneous splenorenal shunt. Mild third-spacing with edematous changes   throughout the abdomen and subcutaneous soft tissues. 2. Cholelithiasis with no acute features. 3. Distention with mural thickening in the duodenal sweep and proximal small   bowel. There is no evidence of obstruction. Findings may be related to a   duodenitis and enteritis. 4. Mural thickening of the distal esophagus with evidence of reflux, likely a   reflux esophagitis. CT HEAD WO CONTRAST   Final Result   Atrophy and mild small vessel ischemic disease. XR CHEST PORTABLE   Final Result   No radiographic evidence of acute pulmonary disease. Assessment/Plan:    Active Hospital Problems    Diagnosis     Moderate protein-calorie malnutrition ([address] [E44.0]     Acute respiratory failure with hypoxia (HCC) [J96.01]     Upper GI bleeding [K92.2]     Acute gastric ulcer with hemorrhage [K25.0]     Acute blood loss anemia [D62]     Shock circulatory (HCC) [R57.9]     Other cirrhosis of liver ([address] [K74.69]     Portal hypertension ([address] [K76.6]     Hypernatremia [E87.0]     Hepatic encephalopathy ([address] [K72.90]        \"71 y.o. female with cirrhosis, etoh use who presented to [facility] with confusion/hepatic encephalopathy and GIB. \"       Acute hepatic encephalopathy. She hadn't been taking her lactulose. Ammonia was 250. Now back on lactulose and rifaximin. Woke up but remained fairly disoriented. She probably does have some underlying chronic dementia, perhaps alcohol related, because her family says she has been slowly getting more confused for a long time. No focal neurological deficits. Suspected UGIB, with ABLA. Required 1u pRBCs. S/p EGD 11/24, noted nonbleeding gastric dieulafoy lesion, endoclipped. Then repeated EGD 11/27 and 2 endoclips placed at ulceration near site of initial clipping. Trend Hb.   Completed course of octreotide and a week of empiric ceftriaxone. Also suspect underlying anemia of chronic disease. Iron studies OK. Acute respiratory failure. Had to be intubated 11/24 to protect her airway, extubated 11/29, did well. Hypernatremia due to poor PO intake of water. Responded to D5W. Encouraged better hydration.        DVT Prophylaxis: SCDs  Diet: ADULT ORAL NUTRITION SUPPLEMENT; Breakfast, Lunch, Dinner; Standard 4 oz Oral Supplement  ADULT DIET; Dysphagia - Minced and Moist; Moderately Thick (Honey); No Drinking Straws  Code Status: Full Code    PT/OT Eval Status: rec'd SNF     Dispo - when her Hb seems about as stable as it is going to get. Perhaps 12/4.   Plan is for SNF      Darryl Khoury MD

## 2021-12-02 NOTE — PROGRESS NOTES
Speech Language Pathology  Facility/Department: [place] [address] - MED SURG/ORTHO  Dysphagia Daily Treatment Note    NAME: Vana Krabbe  : 1950  MRN: [id number]    Patient Diagnosis(es):   Patient Active Problem List    Diagnosis Date Noted    Moderate protein-calorie malnutrition ([address] 2021    Acute respiratory failure with hypoxia ([address] 2021    Upper GI bleeding 2021    Acute gastric ulcer with hemorrhage 2021    Acute blood loss anemia 2021    Shock circulatory ([address] 2021    Other cirrhosis of liver ([address] 2021    Portal hypertension ([address] 2021    Hypernatremia 2021    Chronic thoracic spine pain 2020    Disc displacement, thoracic 2020    Chronic thoracic back pain 2020    Moderate malnutrition ([address] 2018    Hepatic encephalopathy ([address] 2018    Hyponatremia 2018    Acute metabolic encephalopathy     Hypokalemia 2018    QT prolongation 2018    Lactic acidosis 2018    Elevated LFTs 2018    Kyphosis of thoracic region 2018    DDD (degenerative disc disease), thoracic 2018    Scapular dyskinesis 2018     Allergies: Allergies   Allergen Reactions    Azithromycin Nausea And Vomiting    Sulfa Antibiotics Rash     Subjective:  80year old female admitted with hepatic encephalopathy. Pain: denies    Current Diet: ADULT ORAL NUTRITION SUPPLEMENT; Breakfast, Lunch, Dinner; Standard 4 oz Oral Supplement  ADULT DIET; Dysphagia - Minced and Moist; Moderately Thick (Honey); No Drinking Straws    Diet Tolerance:  Patient tolerating current diet level without signs/symptoms of penetration / aspiration. P.O. Trials:  [place] / Mildly Thick   x Cup sip x 2   Honey / Moderately Thick   x Cup sip x 15   Solid   x X 2 bites of cracker     Dysphagia Treatment and Impressions:  RN okays SLP entry into pt's room. Pt is alert.  RR 20/min prior to po trials with O2 sat of 93% on RA. No overt aspiration of honey thick liquids via spoon and cup sip. Pt does, however, intermittently hold trials in oral cavity. During one instance, p exhibited wet coughing while holding liquids in oral cavity, indicative of possible premature loss to the pharynx. When cued by SLP to swallow, pt swallows immediately. After initial cue to swallow liquid trials immediately, pt only required intermittent cue to swallow. The recommendation for cueing pt to swallow as needed was reviewed with spouse and RN. Pt developed wet vocal quality after swallow with nectar thick liquid trials, indicative of possible nectar thick liquid trials. Pt had some difficulty with fracturing piece of cracker, and mastication was prolonged and laborious. No gross oral residuals post swallow. RR stable at  20/min following po trials with O2 sat of 94% on RA. Dysphagia Goals:  Timeframe for Long-term Goals: 10 days (12/10/21)  Goal 1: The pt will tolerate safest and least restrictive diet without clinical s/s of aspiration or change in respiratory status. Today, 12/02: Progressing. Ongoing. Short-term Goals  Timeframe for Short-term Goals: 7 days (12/7/21)  1. The patient will tolerate recommended diet without observed clinical signs of aspiration, Today, 12/02: Progressing. Ongoing. 2. The patient/caregiver will demonstrate understanding of compensatory strategies for improved swallowing safety. , Today, 12/02: Progressing. Spouse demonstrates comprehension. Ongoing. 3. The patient will tolerate thin liquids without signs and symptoms of aspiration 10/10 via cup.  Not targeted this date      Recommendations:  Solid Consistency: MINCED/MOIST  Liquid Consistency: HONEY THICK  Medication: in puree    Patient/Family/Caregiver Education: reviewed aspiration precautions, SLP plan of care    Compensatory Strategies: HOB 90* and 30\" after meals; small bites/sips; alternate solids/liquids every 3-5 bites; oral care after every meal; no straws; assist feed    Plan:    Continued Dysphagia treatment with goals per plan of care. Discharge Recommendations: defer to PT/OT    If pt discharges from hospital prior to Speech/Swallowing discharge, this note serves as tx and discharge summary. Total Treatment Time / Charges     Time in Time out Total Time / units   Cognitive Tx         Speech Tx      Dysphagia Tx  1630 1656  26 min / 1 unit     Signature:    Deana Felix, [address] [place] [id number]  Speech-Language Pathologist

## 2021-12-02 NOTE — PROGRESS NOTES
Occupational Therapy  Facility/Department: [facility] C5 - MED SURG/ORTHO  Daily Treatment Note  NAME: Maryann Jesus  : 1950  MRN: [id number]    Date of Service: 2021    Discharge Recommendations:  Subacute/Skilled Nursing Facility       Assessment   Performance deficits / Impairments: Decreased functional mobility ; Decreased ADL status; Decreased ROM; Decreased safe awareness; Decreased cognition; Decreased balance; Decreased endurance; Decreased high-level IADLs; Decreased fine motor control; Decreased coordination; Decreased posture; Decreased strength  Assessment: Pt again with fair tolerance of OT treatment, pt demos improved ability to participate in UE grooming however still requires max/total A. Pt dependent of 2 for all transfer training with Alissa Hart and bed mobility. Co-tx collaboration this date with PT staff to safely progress pt toward goals. Pt will have better occupational performance outcomes within a co-treatment than 1:1 session. typically I with BADLs and now functioning below her baseline, would benefit from continued skilled OT in SNF setting at d/c. Prognosis: Fair  OT Education: OT Role; Plan of Care; Transfer Training; Orientation; ADL Adaptive Strategies  Disease Specific Education: Pt educated on importance of OOB mobility, prevention of complications of bedrest, and general safety during hospitalization. Pt verbalized understanding, would benefit from reinforcement due to cognitive deficits. Barriers to Learning: cognition  REQUIRES OT FOLLOW UP: Yes  Activity Tolerance  Activity Tolerance: Vitals: BP= 127/66, HR= 92, SPO2= 95%  Safety Devices  Safety Devices in place: Yes  Type of devices: Left in chair; Chair alarm in place; Call light within reach; Nurse notified; Gait belt         Patient Diagnosis(es): The primary encounter diagnosis was Hepatic encephalopathy ([facility] [address] A diagnosis of Anemia, unspecified type was also pertinent to this visit.       has a past medical history of Esophagitis and Hypertension. has a past surgical history that includes endoscopic ultrasound (upper); Upper gastrointestinal endoscopy (N/A, 11/24/2021); and Upper gastrointestinal endoscopy (N/A, 11/27/2021). Restrictions  Restrictions/Precautions  Restrictions/Precautions: General Precautions, Fall Risk  Position Activity Restriction  Other position/activity restrictions: boyd, rectal tube     Subjective   General  Chart Reviewed: Yes  Patient assessed for rehabilitation services?: Yes  Additional Pertinent Hx: Per chart 11/24, \"79 y.o. female with cirrhosis, etoh use who presented to [facility] with confusion. Pt was found by  today this morning around 6-7am, standing up between the couch and coffee table. He recalls hearing a 'crash' this morning prompting him to come down to check on her. She was INcoherent this am.  He believes she still drinks but is unsure(he works 12 hour shifts). She was relatively normal last night but noted to be more fatigued/tired and sleepy than usual. She has been off her meds and has hx of poor compliance with follow up. \"  Response to previous treatment: Patient with no complaints from previous session  Family / Caregiver Present: Yes  Referring Practitioner: BEBO Brewer CNP  Diagnosis: Hepatic encephalopathy, Esophagogastroduodenoscopy (11/24, 11/27), Intubated 11/24-11/29    Subjective  Subjective: Pt resting in bed, agreeable to OT treatment. Vital Signs  Patient Currently in Pain: Denies     Orientation  Orientation  Overall Orientation Status: Impaired  Orientation Level: Oriented to person; Disoriented to place; Disoriented to situation; Disoriented to time     Objective    ADL  Grooming: Dependent/Total; Maximum assistance (to brush hair, max A for hand over hand to wash face)  Toileting: Dependent/Total (boyd and rectal tube)     Balance  Sitting Balance:  Moderate assistance (varies from CGA-mod A)  Standing Balance: Dependent/Total (max A of 2 for partial stand on Janet STARK)  Standing Balance  Activity: bed to chair with Pernell STARK     Bed mobility  Supine to Sit: Dependent/Total; 2 Person assistance (max A of 2 to pt L)    Transfers  Sit to stand: Dependent/Total; 2 Person assistance  Stand to sit: Dependent/Total; 2 Person assistance  Transfer Comments: Pt required max A of 2 and Bernadine Randolph for bed to chair transfer. Cognition  Overall Cognitive Status: Exceptions  Arousal/Alertness: Delayed responses to stimuli; Inconsistent responses to stimuli  Following Commands: Inconsistently follows commands  Attention Span: Attends with cues to redirect;  Difficulty attending to directions  Memory: Decreased short term memory; Decreased recall of biographical Information; Decreased recall of recent events  Safety Judgement: Decreased awareness of need for safety; Decreased awareness of need for assistance  Problem Solving: Decreased awareness of errors; Assistance required to identify errors made; Assistance required to implement solutions  Insights: Not aware of deficits  Initiation: Requires cues for all  Sequencing: Requires cues for all     Perception  Overall Perceptual Status: Impaired  Unilateral Attention: Cues to maintain midline in sitting; Cues to attend left visual field       Plan   Plan  Times per week: 3-5x/week  Current Treatment Recommendations: Strengthening, Balance Training, Functional Mobility Training, Endurance Training, Cognitive Reorientation, Safety Education & Training, Patient/Caregiver Education & Training, Self-Care / ADL, Positioning    AM-PAC Score  AM-[facility] Inpatient Daily Activity Raw Score: 6 (21 1153)  AM-PAC Inpatient ADL T-Scale Score : 17.07 (21 1153)  ADL Inpatient CMS 0-100% Score: 100 (21 1153)  ADL Inpatient CMS G-Code Modifier : CN (21)    Goals  Short term goals  Time Frame for Short term goals: 1 week () unless stated otherwise  Short term goal 1: Pt will LB dress with mod A.-- ongoing 12/2/21  Short term goal 2: Pt will perform at least 2 simple ADLS with min A (64/7). -- ongoing 12/2/21  Short term goal 3: Pt will sit EOB with min A to assist with ADLS. -- ongoing 12/2/21  Short term goal 4: Pt will UB dress with mod A-- ongoing 12/2/21  Patient Goals   Patient goals : did not verbalized but motivated to put on socks.        Therapy Time   Individual Concurrent Group Co-treatment   Time In 1020         Time Out 1100         Minutes Bryson 94, FINLEY/L

## 2021-12-02 NOTE — PROGRESS NOTES
Physical Therapy  Facility/Department: [facility] C5 - MED SURG/ORTHO  Daily Treatment Note  NAME: Vana Krabbe  : 1950  MRN: [id number]    Date of Service: 2021    Discharge Recommendations:  Subacute/Skilled Nursing Facility   PT Equipment Recommendations  Other: defer  If pt is unable to be seen after this session, please let this note serve as discharge summary. Please see case management note for discharge disposition. Thank you. Assessment   Body structures, Functions, Activity limitations: Decreased functional mobility ; Decreased balance; Decreased posture; Decreased strength; Decreased safe awareness; Decreased cognition; Decreased endurance; Decreased coordination  Assessment: No significant change in mobility during co-tx today with OT. Pt required Max A x 2 for safe transfer and to stand. Poor sitting balance with posterior and R side LOB but able to hold with cues for 1-2  minutes. Pt stood to stedy for transfer to the chair. Attempted to facilitate LE exercises, pt resistive to AAROM. Treatment Diagnosis: impaired functional mobility  Specific instructions for Next Treatment: progress mobility as tolerated  Decision Making: High Complexity  PT Education: Goals; General Safety; PT Role; Disease Specific Education; Plan of Care; Functional Mobility Training; Transfer Training; Orientation; Family Education  Patient Education: will need reinforcement, no evidence of learning  Barriers to Learning: cognition  REQUIRES PT FOLLOW UP: Yes  Activity Tolerance  Activity Tolerance: Patient Tolerated treatment well; Patient limited by fatigue; Patient limited by endurance; Patient limited by cognitive status  Activity Tolerance: 127/66, 92 HR, 96% SpO2     Patient Diagnosis(es): The primary encounter diagnosis was Hepatic encephalopathy ([facility] [address] A diagnosis of Anemia, unspecified type was also pertinent to this visit. has a past medical history of Esophagitis and Hypertension.    has a past surgical history that includes endoscopic ultrasound (upper); Upper gastrointestinal endoscopy (N/A, 11/24/2021); and Upper gastrointestinal endoscopy (N/A, 11/27/2021). Restrictions  Restrictions/Precautions  Restrictions/Precautions: General Precautions, Fall Risk  Position Activity Restriction  Other position/activity restrictions: boyd, rectal tube  Subjective   General  Chart Reviewed: Yes  Additional Pertinent Hx: intubated 11/24-11/29  Response To Previous Treatment: Patient with no complaints from previous session. Family / Caregiver Present: Yes  Referring Practitioner: BRANDYN Kelly  Subjective  Subjective: Pt sleeping upon entry. General Comment  Comments: Pt resting in bed on approach; RN cleared pt for therapy  Pain Screening  Patient Currently in Pain: Denies  Vital Signs  Patient Currently in Pain: Denies       Orientation  Orientation  Overall Orientation Status: Impaired  Orientation Level: Oriented to person; Disoriented to time; Disoriented to place; Disoriented to situation  Cognition      Objective   Bed mobility  Supine to Sit: Dependent/Total; 2 Person assistance  Sit to Supine: Unable to assess  Transfers  Sit to Stand: Dependent/Total; 2 Person Assistance; Maximum Assistance  Stand to sit: Dependent/Total; 2 Person Assistance; Maximum Assistance  Bed to Chair: Dependent/Total (via stedy)  Ambulation  Ambulation?: No     Balance  Posture: Poor  Sitting - Static: Fair; -  Sitting - Dynamic: Poor  Standing - Static: Poor  Standing - Dynamic: Poor  Comments: min-mod A for sitting balance at EOB d/t right lateral lean. Pt unable to correct without assist. Brief periods of SBA while sitting.          AM-PAC Score  AM-PAC Inpatient Mobility Raw Score : 9 (12/02/21 1213)  AM-PAC Inpatient T-Scale Score : 30.55 (12/02/21 1213)  Mobility Inpatient CMS 0-100% Score: 81.38 (12/02/21 1213)  Mobility Inpatient CMS G-Code Modifier : CM (12/02/21 1213)          Goals  Short term goals  Time Frame for Short term goals: 1 week (12/7) unless otherwise specified  Short term goal 1: Pt will be mod A with bed mobility. ; 12/2 max Aof 2  Short term goal 2: Pt will be mod A with bed to chair transfers.; 12/2 dep with stedy  Short term goal 3: Pt will ambulate 15 ft with mod A x 2 and LRAD.; 12/2 unable to take steps with walker  Short term goal 4: 12/04: Pt will participate in 12-15 reps of BLE exercises to promote strength and activity tolerance.; 12/2: pt resistant to exercises  Patient Goals   Patient goals : \"to get better and go home\"    Plan    Plan  Times per week: 3-5x/wk  Times per day: Daily  Specific instructions for Next Treatment: progress mobility as tolerated  Current Treatment Recommendations: Strengthening, Neuromuscular Re-education, Home Exercise Program, Safety Education & Training, Balance Training, Endurance Training, Functional Mobility Training, Transfer Training, Gait Training, Equipment Evaluation, Education, & procurement, Patient/Caregiver Education & Training  Safety Devices  Type of devices:  All fall risk precautions in place, Call light within reach, Chair alarm in place, Gait belt, Left in chair, Nurse notified, Patient at risk for falls  Restraints  Initially in place: No     Therapy Time   Individual Concurrent Group Co-treatment   Time In 1019         Time Out 1045         Minutes Gray Wells, PT

## 2021-12-02 NOTE — PROGRESS NOTES
Per therapy pt has been leaning more to the right the past few days. Currently up in the chair and still is leaning to the Right. Messaged Dr Neo Johnson. No change in cognitive status. Still confused.

## 2021-12-03 LAB
ABO/RH: NORMAL
ALBUMIN SERPL-MCNC: 2.1 G/DL (ref 3.4–5)
ANION GAP SERPL CALCULATED.3IONS-SCNC: 4 MMOL/L (ref 3–16)
ANISOCYTOSIS: ABNORMAL
ANTIBODY SCREEN: NORMAL
BASOPHILS ABSOLUTE: 0.1 K/UL (ref 0–0.2)
BASOPHILS RELATIVE PERCENT: 1.2 %
BLOOD BANK DISPENSE STATUS: NORMAL
BLOOD BANK PRODUCT CODE: NORMAL
BPU ID: NORMAL
BUN BLDV-MCNC: 24 MG/DL (ref 7–20)
CALCIUM SERPL-MCNC: 7.9 MG/DL (ref 8.3–10.6)
CHLORIDE BLD-SCNC: 116 MMOL/L (ref 99–110)
CO2: 27 MMOL/L (ref 21–32)
CREAT SERPL-MCNC: <0.5 MG/DL (ref 0.6–1.2)
DESCRIPTION BLOOD BANK: NORMAL
EOSINOPHILS ABSOLUTE: 0.2 K/UL (ref 0–0.6)
EOSINOPHILS RELATIVE PERCENT: 2 %
GFR AFRICAN AMERICAN: >60
GFR NON-AFRICAN AMERICAN: >60
GLUCOSE BLD-MCNC: 86 MG/DL (ref 70–99)
HCT VFR BLD CALC: 21.9 % (ref 36–48)
HEMOGLOBIN: 7.1 G/DL (ref 12–16)
HYPOCHROMIA: ABNORMAL
LYMPHOCYTES ABSOLUTE: 1.7 K/UL (ref 1–5.1)
LYMPHOCYTES RELATIVE PERCENT: 19 %
MCH RBC QN AUTO: 29.3 PG (ref 26–34)
MCHC RBC AUTO-ENTMCNC: 32.3 G/DL (ref 31–36)
MCV RBC AUTO: 90.8 FL (ref 80–100)
MONOCYTES ABSOLUTE: 0.7 K/UL (ref 0–1.3)
MONOCYTES RELATIVE PERCENT: 7.5 %
NEUTROPHILS ABSOLUTE: 6.3 K/UL (ref 1.7–7.7)
NEUTROPHILS RELATIVE PERCENT: 70.3 %
PDW BLD-RTO: 24.1 % (ref 12.4–15.4)
PHOSPHORUS: 3.3 MG/DL (ref 2.5–4.9)
PLATELET # BLD: 105 K/UL (ref 135–450)
PLATELET SLIDE REVIEW: ABNORMAL
PMV BLD AUTO: 8.5 FL (ref 5–10.5)
POIKILOCYTES: ABNORMAL
POLYCHROMASIA: ABNORMAL
POTASSIUM SERPL-SCNC: 3.9 MMOL/L (ref 3.5–5.1)
RBC # BLD: 2.41 M/UL (ref 4–5.2)
SCHISTOCYTES: ABNORMAL
SLIDE REVIEW: ABNORMAL
SODIUM BLD-SCNC: 147 MMOL/L (ref 136–145)
STOMATOCYTES: ABNORMAL
WBC # BLD: 9 K/UL (ref 4–11)

## 2021-12-03 PROCEDURE — 6370000000 HC RX 637 (ALT 250 FOR IP): Performed by: INTERNAL MEDICINE

## 2021-12-03 PROCEDURE — 6370000000 HC RX 637 (ALT 250 FOR IP): Performed by: NURSE PRACTITIONER

## 2021-12-03 PROCEDURE — 2580000003 HC RX 258: Performed by: INTERNAL MEDICINE

## 2021-12-03 PROCEDURE — P9016 RBC LEUKOCYTES REDUCED: HCPCS

## 2021-12-03 PROCEDURE — 86900 BLOOD TYPING SEROLOGIC ABO: CPT

## 2021-12-03 PROCEDURE — 86923 COMPATIBILITY TEST ELECTRIC: CPT

## 2021-12-03 PROCEDURE — 1200000000 HC SEMI PRIVATE

## 2021-12-03 PROCEDURE — 86850 RBC ANTIBODY SCREEN: CPT

## 2021-12-03 PROCEDURE — 92526 ORAL FUNCTION THERAPY: CPT

## 2021-12-03 PROCEDURE — 6360000002 HC RX W HCPCS: Performed by: INTERNAL MEDICINE

## 2021-12-03 PROCEDURE — C9113 INJ PANTOPRAZOLE SODIUM, VIA: HCPCS | Performed by: INTERNAL MEDICINE

## 2021-12-03 PROCEDURE — 80069 RENAL FUNCTION PANEL: CPT

## 2021-12-03 PROCEDURE — 86901 BLOOD TYPING SEROLOGIC RH(D): CPT

## 2021-12-03 PROCEDURE — 85025 COMPLETE CBC W/AUTO DIFF WBC: CPT

## 2021-12-03 RX ORDER — SODIUM CHLORIDE 9 MG/ML
INJECTION, SOLUTION INTRAVENOUS PRN
Status: DISCONTINUED | OUTPATIENT
Start: 2021-12-03 | End: 2021-12-04 | Stop reason: HOSPADM

## 2021-12-03 RX ORDER — DEXTROSE MONOHYDRATE 50 MG/ML
INJECTION, SOLUTION INTRAVENOUS CONTINUOUS
Status: DISCONTINUED | OUTPATIENT
Start: 2021-12-03 | End: 2021-12-04

## 2021-12-03 RX ADMIN — RIFAXIMIN 550 MG: 550 TABLET ORAL at 09:38

## 2021-12-03 RX ADMIN — MIDODRINE HYDROCHLORIDE 5 MG: 5 TABLET ORAL at 18:07

## 2021-12-03 RX ADMIN — SODIUM CHLORIDE, PRESERVATIVE FREE 10 ML: 5 INJECTION INTRAVENOUS at 09:38

## 2021-12-03 RX ADMIN — SODIUM CHLORIDE, PRESERVATIVE FREE 10 ML: 5 INJECTION INTRAVENOUS at 22:58

## 2021-12-03 RX ADMIN — DEXTROSE MONOHYDRATE: 50 INJECTION, SOLUTION INTRAVENOUS at 14:36

## 2021-12-03 RX ADMIN — RIFAXIMIN 550 MG: 550 TABLET ORAL at 22:58

## 2021-12-03 RX ADMIN — PANTOPRAZOLE SODIUM 40 MG: 40 INJECTION, POWDER, FOR SOLUTION INTRAVENOUS at 22:58

## 2021-12-03 RX ADMIN — Medication 100 MG: at 09:38

## 2021-12-03 RX ADMIN — LACTULOSE 10 G: 20 SOLUTION ORAL at 14:55

## 2021-12-03 RX ADMIN — LACTULOSE 10 G: 20 SOLUTION ORAL at 09:38

## 2021-12-03 RX ADMIN — FOLIC ACID 1 MG: 1 TABLET ORAL at 09:38

## 2021-12-03 RX ADMIN — MIDODRINE HYDROCHLORIDE 5 MG: 5 TABLET ORAL at 09:41

## 2021-12-03 RX ADMIN — LACTULOSE 10 G: 20 SOLUTION ORAL at 22:58

## 2021-12-03 RX ADMIN — PANTOPRAZOLE SODIUM 40 MG: 40 INJECTION, POWDER, FOR SOLUTION INTRAVENOUS at 09:38

## 2021-12-03 ASSESSMENT — PAIN SCALES - GENERAL
PAINLEVEL_OUTOF10: 0

## 2021-12-03 NOTE — PROGRESS NOTES
Occupational Therapy/Physical Therapy  Attempted OT/PT tx. Pt is to receive blood transfusion. Cont OT/PT.   El Maverick OT  Ericka Junes PT

## 2021-12-03 NOTE — PROGRESS NOTES
Speech Language Pathology  Facility/Department: [facility] C5 - MED SURG/ORTHO  Dysphagia Daily Treatment Note      Recommendations:  Solid Consistency: Dysphagia III (soft and bite sized)  Liquid Consistency: Nectar (mildly thick) liquids  Medication: Crushable meds crushed in puree    NAME: Nito Celestin  : 1950  MRN: [id number]    Patient Diagnosis(es):   Patient Active Problem List    Diagnosis Date Noted    Moderate protein-calorie malnutrition ([address] 2021    Acute respiratory failure with hypoxia ([address] 2021    Upper GI bleeding 2021    Acute gastric ulcer with hemorrhage 2021    Acute blood loss anemia 2021    Shock circulatory ([address] 2021    Other cirrhosis of liver ([address] 2021    Portal hypertension ([address] 2021    Hypernatremia 2021    Chronic thoracic spine pain 2020    Disc displacement, thoracic 2020    Chronic thoracic back pain 2020    Moderate malnutrition ([address] 2018    Hepatic encephalopathy ([address] 2018    Hyponatremia 2018    Acute metabolic encephalopathy     Hypokalemia 2018    QT prolongation 2018    Lactic acidosis 2018    Elevated LFTs 2018    Kyphosis of thoracic region 2018    DDD (degenerative disc disease), thoracic 2018    Scapular dyskinesis 2018     Allergies: Allergies   Allergen Reactions    Azithromycin Nausea And Vomiting    Sulfa Antibiotics Rash     Subjective: RN okayed SLP entry. Pt seen upright in bed. Pt's family members present at bedside. Pain: no c/o pain    Current Diet: ADULT ORAL NUTRITION SUPPLEMENT; Breakfast, Lunch, Dinner; Standard 4 oz Oral Supplement  ADULT DIET; Dysphagia - Minced and Moist; Moderately Thick (Honey); No Drinking Straws    Diet Tolerance:  Patient tolerating current diet level without signs/symptoms of penetration / aspiration per chart. P.O. Trials:   Thin   x Ice chip x1  Tsp x2  Cup sips x4   Nectar / Mildly Thick   x Cup sips x10   Honey / Moderately Thick       Pudding / Extremely Thick       Puree       Minced and Moist   x Bites x5   Soft Solid   x Bites x4   Solid         Dysphagia Treatment and Impressions:  Upon entry, pt w/ dry vocal quality and strong vocal quality; pt maintained dry vocal quality throughout all PO trials. Pt w/ intermittent throat clearing w/ thin liquids via cup. Pt w/ delayed throat clear x1 w/ soft solid bite. Pt w/ no overt s/s dysphagia w/ nectar and minced and moist consistencies. Recommend pt upgrade to soft and bite sized diet w/ nectar thick liquids. SLP reviewed rationale for current modified diet, role of ST, and aspiration precautions with pt and pt's  present. Pt did not demonstrate evidence of learning, needs reinforcement. Pt's family members verbalized understanding. Dysphagia Goals:  Timeframe for Long-term Goals: 10 days (12/10/21)  Goal 1: The pt will tolerate safest and least restrictive diet without clinical s/s of aspiration or change in respiratory status. 12/03: ongoing, progressing     Short-term Goals  Timeframe for Short-term Goals: 7 days (12/7/21)  1) The patient will tolerate recommended diet without observed clinical signs of aspiration. 12/03: ongoing, see above  2) The patient/caregiver will demonstrate understanding of compensatory strategies for improved swallowing safety. 12/03: ongoing, needs reinforcement.   3) The patient will tolerate thin liquids without signs and symptoms of aspiration 10/10 via cup. 12/03: ongoing, see above    Speech/Language/Cog Goals: n/a     Recommendations:  Solid Consistency: Dysphagia III (soft and bite sized)  Liquid Consistency: Nectar (mildly thick) liquids  Medication: Crushable meds crushed in puree    Patient/Family/Caregiver Education:  See above    Compensatory Strategies:   HOB 90* and 30\" after meals; small bites/sips; alternate solids/liquids every 3-5 bites; oral care after every meal; no straws; assist feed    Plan:    Continued Dysphagia treatment with goals per plan of care. Discharge Recommendations: per PT/OT recs    If pt discharges from hospital prior to Speech/Swallowing discharge, this note serves as tx and discharge summary. Total Treatment Time / Charges     Time in Time out Total Time / units   Cognitive Tx         Speech Tx      Dysphagia Tx 1242 1304 22 min / 1 unit     Signature:  Ceci Noland M.A.  [id number] [facility]  Speech Language Pathologist

## 2021-12-03 NOTE — PROGRESS NOTES
Progress Note  Date:12/3/2021       Room:0548/0548-01  Patient Joe Ga     YOB: 1950     Age:71 y.o. Subjective    Subjective:  Symptoms:  Stable. Pain:  She reports no pain. Review of Systems  Objective         Vitals Last 24 Hours:  TEMPERATURE:  Temp  Av.3 °F (36.8 °C)  Min: 98.2 °F (36.8 °C)  Max: 98.5 °F (36.9 °C)  RESPIRATIONS RANGE: Resp  Av.3  Min: 14  Max: 18  PULSE OXIMETRY RANGE: SpO2  Av.7 %  Min: 92 %  Max: 95 %  PULSE RANGE: Pulse  Av.4  Min: 95  Max: 110  BLOOD PRESSURE RANGE: Systolic (75AVN), NAB:918 , Min:116 , YND:902   ; Diastolic (37DDS), ETX:39, Min:56, Max:72    I/O (24Hr): Intake/Output Summary (Last 24 hours) at 12/3/2021 0733  Last data filed at 12/3/2021 0530  Gross per 24 hour   Intake --   Output 925 ml   Net -925 ml     Objective:  General Appearance:  Not in pain. Vital signs: (most recent): Blood pressure 125/69, pulse 95, temperature 98.5 °F (36.9 °C), temperature source Oral, resp. rate 16, height 5' 5\" (1.651 m), weight 126 lb (57.2 kg), SpO2 93 %. Abdomen: Abdomen is soft. Bowel sounds are normal.   There is no abdominal tenderness. Labs/Imaging/Diagnostics    Labs:  CBC:  Recent Labs     21  0605 21  0605 21  1714 21  0620 21  0546   WBC 9.6  --   --  9.5 9.0   RBC 2.62*  --   --  2.47* 2.41*   HGB 7.5*   < > 7.7* 7.4* 7.1*   HCT 22.7*   < > 23.4* 22.2* 21.9*   MCV 86.6  --   --  89.7 90.8   RDW 20.0*  --   --  21.4* 24.1*   PLT 97*  --   --  106* 105*    < > = values in this interval not displayed.      CHEMISTRIES:  Recent Labs     21  0605 21  0605 21  1714 21  0620 21  0546   *   < > 144 142 147*   K 3.6   < > 3.5 3.3* 3.9   *   < > 113* 111* 116*   CO2 27   < > 27 25 27   BUN 27*   < > 27* 25* 24*   CREATININE <0.5*   < > <0.5* <0.5* <0.5*   GLUCOSE 117*   < > 118* 96 86   PHOS 3.3  --   --  3.3 3.3   MG 2.00  --   --  1.80 --     < > = values in this interval not displayed. PT/INR:No results for input(s): PROTIME, INR in the last 72 hours. APTT:No results for input(s): APTT in the last 72 hours. LIVER PROFILE:No results for input(s): AST, ALT, BILIDIR, BILITOT, ALKPHOS in the last 72 hours. Imaging Last 24 Hours:  No results found. Assessment//Plan           Hospital Problems           Last Modified POA    Hepatic encephalopathy ([address] 11/23/2021 Yes    Acute respiratory failure with hypoxia ([address] 11/29/2021 Yes    Upper GI bleeding 11/29/2021 Yes    Acute gastric ulcer with hemorrhage 11/29/2021 Yes    Acute blood loss anemia 11/29/2021 Yes    Shock circulatory ([address] 11/29/2021 Yes    Other cirrhosis of liver ([address] 11/29/2021 Yes    Portal hypertension ([address] 11/29/2021 Yes    Hypernatremia 11/29/2021 Yes    Moderate protein-calorie malnutrition ([address] 11/30/2021 Yes        Assessment & Plan  81-year-old female with history of alcoholic cirrhosis admitted with encephalopathy and GI bleed. EGD showed no varices but Dieulafoy lesion in proximal stomach which was clipped. H/H is stable around 8/23, and has brown stools.     Plan:   1. Supportive care  2. Continue Protonix  3. F/U H/H and transfuse as appropriate  4.  Will follow   Melanie Hernandez MD       [facility]) [phone]      Electronically signed by Beverly Renner MD on 11/30/21 at 11:01 AM EST

## 2021-12-03 NOTE — PROGRESS NOTES
Hospitalist Progress Note      PCP: Davin Hartman    Date of Admission: 11/23/2021    Chief Complaint: confusion        Subjective:  She remains confused. Still looks comfortable, denies complaints.         Medications:  Reviewed    Infusion Medications    dextrose      sodium chloride      sodium chloride      sodium chloride       Scheduled Medications    lactulose  10 g Oral TID    folic acid  1 mg Oral Daily    thiamine  100 mg Oral Daily    midodrine  5 mg Oral TID WC    pantoprazole  40 mg IntraVENous BID    rifaximin  550 mg Oral BID    sodium chloride flush  5-40 mL IntraVENous 2 times per day     PRN Meds: sodium chloride, sodium chloride, potassium chloride, sodium chloride, sodium chloride flush, ondansetron **OR** ondansetron, acetaminophen **OR** acetaminophen      Intake/Output Summary (Last 24 hours) at 12/3/2021 1029  Last data filed at 12/3/2021 0955  Gross per 24 hour   Intake 120 ml   Output 925 ml   Net -805 ml       Physical Exam Performed:    /62   Pulse 105   Temp 98.2 °F (36.8 °C) (Oral)   Resp 16   Ht 5' 5\" (1.651 m)   Wt 126 lb (57.2 kg)   SpO2 93%   BMI 20.97 kg/m²     General appearance: No apparent distress, appears stated age. Frail, appears chronically ill. HEENT: Pupils equal, round, and reactive to light. Conjunctivae/corneas clear. Neck: Supple, with full range of motion. No jugular venous distention. Trachea midline. Respiratory:normal respiratory effort. Clear to auscultation, bilaterally without Rales/Wheezes/rhonchi. Cardiovascular: Regular rate and rhythm with normal S1/S2 without murmurs, rubs or gallops. Abdomen: Soft, non-tender, non-distended with normal bowel sounds. Musculoskeletal: No clubbing, cyanosis or edema bilaterally.  Full range of motion without deformity. Skin: Skin color, texture, turgor normal.  No rashes or lesions. Neurologic:  Neurovascularly intact without any focal sensory/motor deficits.  Cranial nerves: II-XII intact, grossly non-focal.  Psychiatric:  Alert, oriented to self only, confused, calm and cooperative  Capillary Refill: Brisk,3 seconds, normal   Peripheral Pulses: +2 palpable, equal bilaterally        Labs:   Recent Labs     12/01/21  0605 12/01/21 0605 12/01/21 1714 12/02/21 0620 12/03/21  0546   WBC 9.6  --   --  9.5 9.0   HGB 7.5*   < > 7.7* 7.4* 7.1*   HCT 22.7*   < > 23.4* 22.2* 21.9*   PLT 97*  --   --  106* 105*    < > = values in this interval not displayed. Recent Labs     12/01/21  0605 12/01/21 0605 12/01/21 1714 12/02/21  0620 12/03/21  0546   *   < > 144 142 147*   K 3.6   < > 3.5 3.3* 3.9   *   < > 113* 111* 116*   CO2 27   < > 27 25 27   BUN 27*   < > 27* 25* 24*   CREATININE <0.5*   < > <0.5* <0.5* <0.5*   CALCIUM 7.9*   < > 8.0* 7.6* 7.9*   PHOS 3.3  --   --  3.3 3.3    < > = values in this interval not displayed. No results for input(s): AST, ALT, BILIDIR, BILITOT, ALKPHOS in the last 72 hours. No results for input(s): INR in the last 72 hours. No results for input(s): Felipa [place] in the last 72 hours. Urinalysis:      Lab Results   Component Value Date    NITRU Negative 11/23/2021    BLOODU Negative 11/23/2021    SPECGRAV 1.015 11/23/2021    GLUCOSEU Negative 11/23/2021       Radiology:  XR ABDOMEN FOR NG/OG/NE TUBE PLACEMENT   Final Result   NG tube is in the stomach. XR CHEST PORTABLE   Final Result   Line placement without complicating feature. XR CHEST PORTABLE   Final Result   1. The endotracheal tube tip is 3 cm above the maria g. 2. Clear lungs. XR ABDOMEN (KUB) (SINGLE AP VIEW)   Final Result   Enteric tube side port projects over the expected region of the GE junction,   recommend advancement. XR ABDOMEN (KUB) (SINGLE AP VIEW)   Final Result   Enteric tube tip and side port project over the stomach. CT ABDOMEN PELVIS W IV CONTRAST Additional Contrast? None   Final Result   1.  Cirrhosis with evidence of portal hypertension including multiple upper   varices including distal esophageal and gastric varices as well as a   spontaneous splenorenal shunt. Mild third-spacing with edematous changes   throughout the abdomen and subcutaneous soft tissues. 2. Cholelithiasis with no acute features. 3. Distention with mural thickening in the duodenal sweep and proximal small   bowel. There is no evidence of obstruction. Findings may be related to a   duodenitis and enteritis. 4. Mural thickening of the distal esophagus with evidence of reflux, likely a   reflux esophagitis. CT HEAD WO CONTRAST   Final Result   Atrophy and mild small vessel ischemic disease. XR CHEST PORTABLE   Final Result   No radiographic evidence of acute pulmonary disease. Assessment/Plan:    Active Hospital Problems    Diagnosis     Moderate protein-calorie malnutrition ([address] [E44.0]     Acute respiratory failure with hypoxia ([facility]) [J96.01]     Upper GI bleeding [K92.2]     Acute gastric ulcer with hemorrhage [K25.0]     Acute blood loss anemia [D62]     Shock circulatory (HCC) [R57.9]     Other cirrhosis of liver ([address] [K74.69]     Portal hypertension ([address] [K76.6]     Hypernatremia [E87.0]     Hepatic encephalopathy ([address] [K72.90]        \"71 y.o. female with cirrhosis, etoh use who presented to [facility] with confusion/hepatic encephalopathy and GIB. \"       Acute hepatic encephalopathy. She hadn't been taking her lactulose. Ammonia was 250. Now back on lactulose and rifaximin. Woke up but remained fairly disoriented. She probably does have some underlying chronic dementia, perhaps alcohol related, because her family says she has been slowly getting more confused for a long time. No focal neurological deficits. Suspected UGIB, with ABLA. Required 2u pRBCs early on, then another 1u 12/3. S/p EGD 11/24, noted nonbleeding gastric dieulafoy lesion, endoclipped.   Then repeated EGD 11/27 and 2 endoclips placed at ulceration near site of initial clipping. Trend Hb. Completed course of octreotide and a week of empiric ceftriaxone. Also suspect underlying anemia of chronic disease. Iron studies OK. Acute respiratory failure. Had to be intubated 11/24 to protect her airway, extubated 11/29, did well. Hypernatremia due to poor PO intake of water. Responded to a course of D5W, but then Na became elevated again after the D5W was stopped so this had to be restarted. Encouraged better hydration. This might be an ongoing issue unless she starts drinking more.        DVT Prophylaxis: SCDs  Diet: ADULT ORAL NUTRITION SUPPLEMENT; Breakfast, Lunch, Dinner; Standard 4 oz Oral Supplement  ADULT DIET; Dysphagia - Minced and Moist; Moderately Thick (Honey); No Drinking Straws  Code Status: Full Code    PT/OT Eval Status: rec'd SNF     Dispo - when her Hb and Na seem about as stable as it is going to get. Perhaps 12/4 - 12/5.   Plan is for SNF      Taisha Duvall MD

## 2021-12-04 VITALS
OXYGEN SATURATION: 92 % | HEIGHT: 65 IN | RESPIRATION RATE: 18 BRPM | TEMPERATURE: 98.3 F | HEART RATE: 99 BPM | DIASTOLIC BLOOD PRESSURE: 70 MMHG | WEIGHT: 126 LBS | SYSTOLIC BLOOD PRESSURE: 110 MMHG | BODY MASS INDEX: 20.99 KG/M2

## 2021-12-04 LAB
ANION GAP SERPL CALCULATED.3IONS-SCNC: 4 MMOL/L (ref 3–16)
BUN BLDV-MCNC: 21 MG/DL (ref 7–20)
CALCIUM SERPL-MCNC: 7.6 MG/DL (ref 8.3–10.6)
CHLORIDE BLD-SCNC: 107 MMOL/L (ref 99–110)
CO2: 27 MMOL/L (ref 21–32)
CREAT SERPL-MCNC: <0.5 MG/DL (ref 0.6–1.2)
GFR AFRICAN AMERICAN: >60
GFR NON-AFRICAN AMERICAN: >60
GLUCOSE BLD-MCNC: 109 MG/DL (ref 70–99)
HCT VFR BLD CALC: 26.1 % (ref 36–48)
HEMOGLOBIN: 8.7 G/DL (ref 12–16)
MAGNESIUM: 1.9 MG/DL (ref 1.8–2.4)
MCH RBC QN AUTO: 30.2 PG (ref 26–34)
MCHC RBC AUTO-ENTMCNC: 33.4 G/DL (ref 31–36)
MCV RBC AUTO: 90.3 FL (ref 80–100)
PDW BLD-RTO: 19.2 % (ref 12.4–15.4)
PLATELET # BLD: 108 K/UL (ref 135–450)
PMV BLD AUTO: 8.1 FL (ref 5–10.5)
POTASSIUM REFLEX MAGNESIUM: 3.3 MMOL/L (ref 3.5–5.1)
RBC # BLD: 2.89 M/UL (ref 4–5.2)
SARS-COV-2, NAAT: NOT DETECTED
SODIUM BLD-SCNC: 138 MMOL/L (ref 136–145)
WBC # BLD: 10.6 K/UL (ref 4–11)

## 2021-12-04 PROCEDURE — 99232 SBSQ HOSP IP/OBS MODERATE 35: CPT | Performed by: INTERNAL MEDICINE

## 2021-12-04 PROCEDURE — 6370000000 HC RX 637 (ALT 250 FOR IP): Performed by: INTERNAL MEDICINE

## 2021-12-04 PROCEDURE — 6370000000 HC RX 637 (ALT 250 FOR IP): Performed by: NURSE PRACTITIONER

## 2021-12-04 PROCEDURE — 6360000002 HC RX W HCPCS: Performed by: INTERNAL MEDICINE

## 2021-12-04 PROCEDURE — 80048 BASIC METABOLIC PNL TOTAL CA: CPT

## 2021-12-04 PROCEDURE — C9113 INJ PANTOPRAZOLE SODIUM, VIA: HCPCS | Performed by: INTERNAL MEDICINE

## 2021-12-04 PROCEDURE — 2580000003 HC RX 258: Performed by: INTERNAL MEDICINE

## 2021-12-04 PROCEDURE — 83735 ASSAY OF MAGNESIUM: CPT

## 2021-12-04 PROCEDURE — 85027 COMPLETE CBC AUTOMATED: CPT

## 2021-12-04 PROCEDURE — 87635 SARS-COV-2 COVID-19 AMP PRB: CPT

## 2021-12-04 RX ORDER — MIDODRINE HYDROCHLORIDE 5 MG/1
5 TABLET ORAL
Qty: 90 TABLET | Refills: 3
Start: 2021-12-04

## 2021-12-04 RX ORDER — LACTULOSE 10 G/15ML
10 SOLUTION ORAL 3 TIMES DAILY
Qty: 946 ML | Refills: 1
Start: 2021-12-04

## 2021-12-04 RX ORDER — LANOLIN ALCOHOL/MO/W.PET/CERES
100 CREAM (GRAM) TOPICAL DAILY
Qty: 30 TABLET | Refills: 3
Start: 2021-12-05

## 2021-12-04 RX ORDER — M-VIT,TX,IRON,MINS/CALC/FOLIC 27MG-0.4MG
1 TABLET ORAL DAILY
Qty: 30 TABLET | Refills: 11
Start: 2021-12-04 | End: 2022-12-04

## 2021-12-04 RX ADMIN — POTASSIUM CHLORIDE 20 MEQ: 400 INJECTION, SOLUTION INTRAVENOUS at 07:17

## 2021-12-04 RX ADMIN — MIDODRINE HYDROCHLORIDE 5 MG: 5 TABLET ORAL at 10:20

## 2021-12-04 RX ADMIN — Medication 100 MG: at 10:20

## 2021-12-04 RX ADMIN — DEXTROSE MONOHYDRATE: 50 INJECTION, SOLUTION INTRAVENOUS at 02:56

## 2021-12-04 RX ADMIN — LACTULOSE 10 G: 20 SOLUTION ORAL at 15:04

## 2021-12-04 RX ADMIN — PANTOPRAZOLE SODIUM 40 MG: 40 INJECTION, POWDER, FOR SOLUTION INTRAVENOUS at 10:19

## 2021-12-04 RX ADMIN — SODIUM CHLORIDE, PRESERVATIVE FREE 10 ML: 5 INJECTION INTRAVENOUS at 10:19

## 2021-12-04 RX ADMIN — FOLIC ACID 1 MG: 1 TABLET ORAL at 10:20

## 2021-12-04 RX ADMIN — LACTULOSE 10 G: 20 SOLUTION ORAL at 10:21

## 2021-12-04 RX ADMIN — RIFAXIMIN 550 MG: 550 TABLET ORAL at 10:20

## 2021-12-04 RX ADMIN — POTASSIUM CHLORIDE 20 MEQ: 400 INJECTION, SOLUTION INTRAVENOUS at 10:23

## 2021-12-04 ASSESSMENT — PAIN SCALES - GENERAL: PAINLEVEL_OUTOF10: 0

## 2021-12-04 NOTE — DISCHARGE SUMMARY
Hospital Medicine Discharge Summary    Patient ID: Nicko Natarajan      Patient's PCP: Shraddha Louis    Admit Date: 11/23/2021     Discharge Date:   12/04/21     Admitting Physician: Annelise Ochoa MD     Discharge Physician: Wicho Santana MD     Discharge Diagnoses: Active Hospital Problems    Diagnosis     Moderate protein-calorie malnutrition ([facility] [E44.0]     Acute respiratory failure with hypoxia (HCC) [J96.01]     Upper GI bleeding [K92.2]     Acute gastric ulcer with hemorrhage [K25.0]     Acute blood loss anemia [D62]     Shock circulatory (HCC) [R57.9]     Other cirrhosis of liver ([facility] [K74.69]     Portal hypertension ([facility] [K76.6]     Hypernatremia [E87.0]     Hepatic encephalopathy ([facility] [K72.90]        The patient was seen and examined on day of discharge and this discharge summary is in conjunction with any daily progress note from day of discharge. Hospital Course:  \"71 y.o. female with cirrhosis, etoh use who presented to [facility] with confusion/hepatic encephalopathy and GIB. \"        Acute hepatic encephalopathy. She hadn't been taking her lactulose. Ammonia was 250. Now back on lactulose and rifaximin. Woke up, became very alert, but remained disoriented. She probably does have some underlying chronic dementia, perhaps alcohol related, because her family says she has been slowly getting more confused for a long time. No focal neurological deficits.      Suspected UGIB, with ABLA. Required 2u pRBCs early on, then another 1u 12/3. S/p EGD 11/24, noted nonbleeding gastric dieulafoy lesion, endoclipped. Then repeated EGD 11/27 and 2 endoclips placed at ulceration near site of initial clipping. Trend Hb. Completed course of octreotide and a week of empiric ceftriaxone. Also suspect underlying anemia of chronic disease. Iron studies OK.     Acute respiratory failure.   Had to be intubated 11/24 to protect her airway, extubated 11/29, did well.     Hypernatremia due to poor PO intake of water. Responded to a course of D5W, but then Na became elevated again after the D5W was stopped so this had to be restarted. Encouraged better hydration. This might be an ongoing issue unless she starts drinking more as outpatient. Physical Exam Performed:     /70   Pulse 99   Temp 98.3 °F (36.8 °C) (Oral)   Resp 18   Ht 5' 5\" (1.651 m)   Wt 126 lb (57.2 kg)   SpO2 92%   BMI 20.97 kg/m²       General appearance: No apparent distress, appears stated age. Frail, appears chronically ill. HEENT: Pupils equal, round, and reactive to light. Conjunctivae/corneas clear. Neck: Supple, with full range of motion. No jugular venous distention. Trachea midline. Respiratory:normal respiratory effort. Clear to auscultation, bilaterally without Rales/Wheezes/rhonchi. Cardiovascular: Regular rate and rhythm with normal S1/S2 without murmurs, rubs or gallops. Abdomen: Soft, non-tender, non-distended with normal bowel sounds. Musculoskeletal: No clubbing, cyanosis or edema bilaterally.  Full range of motion without deformity. Skin: Skin color, texture, turgor normal.  No rashes or lesions. Neurologic:  Neurovascularly intact without any focal sensory/motor deficits. Cranial nerves: II-XII intact, grossly non-focal.  Psychiatric:  Alert, oriented to self and \"Eddie\" only, confused, calm and cooperative  Capillary Refill: Brisk,3 seconds, normal   Peripheral Pulses: +2 palpable, equal bilaterally      Labs:  For convenience and continuity at follow-up the following most recent labs are provided:      CBC:    Lab Results   Component Value Date    WBC 10.6 12/04/2021    HGB 8.7 12/04/2021    HCT 26.1 12/04/2021     12/04/2021       Renal:    Lab Results   Component Value Date     12/04/2021    K 3.3 12/04/2021     12/04/2021    CO2 27 12/04/2021    BUN 21 12/04/2021    CREATININE <0.5 12/04/2021    CALCIUM 7.6 12/04/2021    PHOS 3.3 12/03/2021         Significant Diagnostic Studies    Radiology:   XR ABDOMEN FOR NG/OG/NE TUBE PLACEMENT   Final Result   NG tube is in the stomach. XR CHEST PORTABLE   Final Result   Line placement without complicating feature. XR CHEST PORTABLE   Final Result   1. The endotracheal tube tip is 3 cm above the maria g. 2. Clear lungs. XR ABDOMEN (KUB) (SINGLE AP VIEW)   Final Result   Enteric tube side port projects over the expected region of the GE junction,   recommend advancement. XR ABDOMEN (KUB) (SINGLE AP VIEW)   Final Result   Enteric tube tip and side port project over the stomach. CT ABDOMEN PELVIS W IV CONTRAST Additional Contrast? None   Final Result   1. Cirrhosis with evidence of portal hypertension including multiple upper   varices including distal esophageal and gastric varices as well as a   spontaneous splenorenal shunt. Mild third-spacing with edematous changes   throughout the abdomen and subcutaneous soft tissues. 2. Cholelithiasis with no acute features. 3. Distention with mural thickening in the duodenal sweep and proximal small   bowel. There is no evidence of obstruction. Findings may be related to a   duodenitis and enteritis. 4. Mural thickening of the distal esophagus with evidence of reflux, likely a   reflux esophagitis. CT HEAD WO CONTRAST   Final Result   Atrophy and mild small vessel ischemic disease. XR CHEST PORTABLE   Final Result   No radiographic evidence of acute pulmonary disease. Consults:     IP CONSULT TO GI  IP CONSULT TO GI  IP CONSULT TO PULMONOLOGY  IP CONSULT TO DIETITIAN    Disposition:  SNF     Condition at Discharge: Stable    Discharge Instructions/Follow-up:  Follow up with PCP within 1-2 weeks. Encourage her to drink plenty of honey-thick liquids. Check her CBC again in about a week. She reportedly has an appointment with Dr. Alfredo Smith of GI on 12/12.     Code Status:  Full Code Activity: activity as tolerated    Diet: regular diet and encourage fluids      Discharge Medications:     Current Discharge Medication List           Details   Multiple Vitamins-Minerals (THERAPEUTIC MULTIVITAMIN-MINERALS) tablet Take 1 tablet by mouth daily  Qty: 30 tablet, Refills: 11      lactulose (CHRONULAC) 10 GM/15ML solution Take 15 mLs by mouth 3 times daily  Qty: 946 mL, Refills: 1      midodrine (PROAMATINE) 5 MG tablet Take 1 tablet by mouth 3 times daily (with meals)  Qty: 90 tablet, Refills: 3      thiamine 100 MG tablet Take 1 tablet by mouth daily  Qty: 30 tablet, Refills: 3              Details   naloxone 4 MG/0.1ML LIQD nasal spray 1 spray by Nasal route as needed for Opioid Reversal  Qty: 1 each, Refills: 0      cyclobenzaprine (FLEXERIL) 10 MG tablet TAKE 1 TABLET BY MOUTH EVERY 12 HOURS AS NEEDED FOR PAIN. Qty: 40 tablet, Refills: 0    Associated Diagnoses: Scapular dyskinesis; DDD (degenerative disc disease), thoracic; Kyphosis of thoracic region, unspecified kyphosis type      rifaximin (XIFAXAN) 550 MG tablet Take 1 tablet by mouth 2 times daily  Qty: 60 tablet, Refills: 2      pantoprazole (PROTONIX) 40 MG tablet Take 1 tablet by mouth every morning (before breakfast)  Qty: 30 tablet, Refills: 3               Time Spent on discharge is more than 30 minutes in the examination, evaluation, counseling and review of medications and discharge plan. Signed:    Bay Iverson MD   12/4/2021      Thank you Levi Muhammad for the opportunity to be involved in this patient's care. If you have any questions or concerns please feel free to contact me at [phone].

## 2021-12-04 NOTE — DISCHARGE INSTR - COC
Continuity of Care Form    Patient Name: Buddy Sands   :  1950  MRN:  [id number]    Admit date:  2021  Discharge date:  21    Code Status Order: Full Code   Advance Directives:   [facility] Documentation       Date/Time Healthcare Directive Type of Healthcare Directive Copy in [facility] [address] Agent's Name Healthcare Agent's Phone Number    [phone] Unknown, patient unable to respond due to medical condition -- -- -- -- --            Admitting Physician:  Karl Bravo MD  PCP: Cherri Whiteside    Discharging Nurse: Epi Adams Unit/Room#: 8274/6083-81  Discharging Unit Phone Number: [phone]    Emergency Contact:   Extended Emergency Contact Information  Primary Emergency Contact: Kerry Jiang  Address: [address] [place] [address] Phone: [phone]  Mobile Phone: [phone]  Relation: Spouse    Past Surgical History:  Past Surgical History:   Procedure Laterality Date    ENDOSCOPIC ULTRASOUND (UPPER)      UPPER GASTROINTESTINAL ENDOSCOPY N/A 2021    EGD CONTROL HEMORRHAGE performed by Allison Dia MD at [address] [facility] N/A 2021    EGD CONTROL HEMORRHAGE performed by Tyrone Gr DO at [address] [place] [address]       Immunization History:   Immunization History   Administered Date(s) Administered    COVID-19, CHRISSY [place], 30mcg/0.3mL 2021, 2021       Active Problems:  Patient Active Problem List   Diagnosis Code    Kyphosis of thoracic region M40.204    DDD (degenerative disc disease), thoracic M51.34    Scapular dyskinesis G25.89    Hepatic encephalopathy (HCC) K72.90    Hyponatremia N33.7    Acute metabolic encephalopathy T42.65    Hypokalemia E87.6    QT prolongation R94.31    Lactic acidosis E87.2    Elevated LFTs R79.89    Moderate malnutrition (HCC) E44.0    Disc displacement, thoracic M51.24    Chronic critical illness    Colostomy/Ileostomy/Ileal Conduit: No  [REMOVED] Rectal Tube With balloon-Stool Appearance: Loose, Watery  [REMOVED] Rectal Tube With balloon-Stool Color: Brown  [REMOVED] Rectal Tube With balloon-Stool Amount: Large    Date of Last BM: 12/4/21  No intake or output data in the 24 hours ending 12/04/21 1041  I/O last 3 completed shifts: In: 120 [P.O.:120]  Out: -     Safety Concerns:     History of Falls (last 30 days) and At Risk for Falls    Impairments/Disabilities:      Vision    Nutrition Therapy:  Current Nutrition Therapy:   - Oral Diet:  Dysphagia 2 mechanically altered    Routes of Feeding: Oral  Liquids: no straws  Daily Fluid Restriction: no  Last Modified Barium Swallow with Video (Video Swallowing Test): not done    Treatments at the Time of Hospital Discharge:   Respiratory Treatments:   Oxygen Therapy:  is not on home oxygen therapy.   Ventilator:    - No ventilator support    Rehab Therapies: Physical Therapy and Occupational Therapy  Weight Bearing Status/Restrictions: No weight bearing restirctions  Other Medical Equipment (for information only, NOT a DME order):  stedy  Other Treatments:     Patient's personal belongings (please select all that are sent with patient):  Glasses    RN SIGNATURE:  Electronically signed by Rubia Car RN on 12/4/21 at 2:09 PM EST    CASE MANAGEMENT/SOCIAL WORK SECTION    Inpatient Status Date:     Readmission Risk Assessment Score:  Readmission Risk              Risk of Unplanned Readmission:  22           Discharging to Facility/ Agency   Name: Kamla Pascal  Address:  [address] [place] [address]    Dialysis Facility (if applicable)   Name:  Address:  Dialysis Schedule:  Phone:  Fax:    / signature: Electronically signed by Ferny Mullins RN on 12/4/21 at 11:06 AM EST    PHYSICIAN SECTION    Prognosis: Good    Condition at Discharge: Stable    Rehab Potential (if transferring to Rehab): Good    Recommended Labs or Other Treatments After Discharge: Follow up with PCP within 1-2 weeks. Encourage her to drink plenty of honey-thick liquids. Check her CBC again in about a week. She reportedly has an appointment with Dr. Dylan Camacho of GI on 12/12. Physician Certification: I certify the above information and transfer of Deon Sloan  is necessary for the continuing treatment of the diagnosis listed and that she requires [place] for less 30 days.      Update Admission H&P: No change in H&P    PHYSICIAN SIGNATURE:  Electronically signed by Giovanna Hadley MD on 12/4/21 at 10:41 AM EST

## 2021-12-04 NOTE — PROGRESS NOTES
[address] ,  [address] [facility]  Phone: [phone] [facility],  [address] [facility], [address] [place] [address]  Phone: [phone]   Fax:[phone]    HPI: Loyda Noel is a(n)71 y.o. female with medical history of alcohol cirrhosis admitted for work-up and treatment for   Hepatic encephalopathy ([facility] [address] [K72.90]  Anemia, unspecified type [D64.9]. We are following for decompensated alcoholic cirrhosis with GI bleed. Status post EGD with findings of Dieulafoy lesion in the proximal stomach status post clip. No evidence of varices. Patient was seen and examined about 11:55p.m. Reports felling improved. Denies abdominal pain, nausea, vomiting, fever, chills, constipation, diarrhea, hematochezia or melenic stools. Current Hospital Schedued Meds   lactulose  10 g Oral TID    folic acid  1 mg Oral Daily    thiamine  100 mg Oral Daily    midodrine  5 mg Oral TID WC    pantoprazole  40 mg IntraVENous BID    rifaximin  550 mg Oral BID    sodium chloride flush  5-40 mL IntraVENous 2 times per day     Current Hospital IV Meds   sodium chloride      sodium chloride      sodium chloride       Current Hospital Prn Meds  sodium chloride, sodium chloride, potassium chloride, sodium chloride, sodium chloride flush, ondansetron **OR** ondansetron, acetaminophen **OR** acetaminophen    I/O last 3 completed shifts:   In: 120 [P.O.:120]  Out: -   /70   Pulse 99   Temp 98.3 °F (36.8 °C) (Oral)   Resp 18   Ht 5' 5\" (1.651 m)   Wt 126 lb (57.2 kg)   SpO2 92%   BMI 20.97 kg/m²   BMs: 1  Wt Readings from Last 3 Encounters:   12/03/21 126 lb (57.2 kg)   03/11/19 132 lb 0.9 oz (59.9 kg)   12/06/18 132 lb 0.9 oz (59.9 kg)       Physical Exam:  VITAL SIGNS: /70   Pulse 99   Temp 98.3 °F (36.8 °C) (Oral)   Resp 18   Ht 5' 5\" (1.651 m)   Wt 126 lb (57.2 kg)   SpO2 92%   BMI 20.97 kg/m²   Wt Readings from Last 3 Encounters:   12/03/21 126 lb (57.2 kg)   03/11/19 132 lb 0.9 oz (59.9 kg)   12/06/18 132 lb 0.9 oz (59.9 kg)     Constitutional: Well developed, Well nourished, No acute distress, Non-toxic appearance. HENT: Normocephalic, Atraumatic, Bilateral external ears normal, Oropharynx moist, No oral exudates, Nose normal.   Eyes: Conjunctiva normal, No discharge. Neck: Normal range of motion  Cardiovascular: Normal heart rate, Normal rhythm, No murmurs  Thorax & Lungs: Normal breath sounds, No respiratory distress,. Abdomen:  normal bowel sounds, soft, non tender, non distended, no hernias  Rectal:  Deferred. Skin: Warm, Dry, No erythema, No rash  Lower Extremities: Intact distal pulses, No edema. Neurologic: Alert & oriented x 3        Lab Results   Component Value Date    ALT 27 11/26/2021    AST 48 (H) 11/26/2021    ALKPHOS 71 11/26/2021    BILIDIR 1.9 (H) 12/10/2018    PROT 4.7 (L) 11/26/2021    LABALBU 2.1 (L) 12/03/2021    INR 2.33 (H) 11/25/2021     Lab Results   Component Value Date    WBC 10.6 12/04/2021    HGB 8.7 (L) 12/04/2021    HCT 26.1 (L) 12/04/2021    MCV 90.3 12/04/2021     (L) 12/04/2021     Lab Results   Component Value Date     12/04/2021    K 3.3 12/04/2021     12/04/2021    CO2 27 12/04/2021    BUN 21 12/04/2021     Lab Results   Component Value Date    CREATININE <0.5 (L) 12/04/2021       A/P  1. Decompensated alcoholic cirrhosis with GI bleed. [place] post EGD 12/27/21with findings of Dieulafoy lesion in the proximal stomach status post clip. No evidence of varices. Plan:  Hemoglobin and hematocrit remains stable. No evidence of acute overt GI bleeding. Continue Protonix 40 mg orally daily  Continue supportive care. Monitor electrolytes and correct as needed. Continue lactulose and rifaximin. Patient to follow up with GI, Dr. Josi Smith within 2-4 weeks. Recall as needed.      Active Problems:    Hepatic encephalopathy (HCC)    Acute respiratory failure with hypoxia (HCC)    Upper GI bleeding    Acute gastric ulcer with hemorrhage    Acute blood loss anemia    Shock circulatory (HCC)    Other cirrhosis of liver (HCC)    Portal hypertension (HCC)    Hypernatremia    Moderate protein-calorie malnutrition (HCC)  Resolved Problems:    * No resolved hospital problems. *    Patient Active Problem List   Diagnosis Code    Kyphosis of thoracic region M40.204    DDD (degenerative disc disease), thoracic M51.34    Scapular dyskinesis G25.89    Hepatic encephalopathy ([address] K72.90    Hyponatremia E87.1    Acute metabolic encephalopathy Y12.77    Hypokalemia E87.6    QT prolongation R94.31    Lactic acidosis E87.2    Elevated LFTs R79.89    Moderate malnutrition (HCC) E44.0    Disc displacement, thoracic M51.24    Chronic thoracic back pain M54.6, G89.29    Chronic thoracic spine pain M54.6, G89.29    Acute respiratory failure with hypoxia (HCC) J96.01    Upper GI bleeding K92.2    Acute gastric ulcer with hemorrhage K25.0    Acute blood loss anemia D62    Shock circulatory (HCC) R57.9    Other cirrhosis of liver (HCC) K74.69    Portal hypertension (HCC) K76.6    Hypernatremia E87.0    Moderate protein-calorie malnutrition (HCC) E44.0       Thank you for involving [facility] Gastroenterology in the hospital care of [facility]. For further questions or concerns, we can best be reached through perfect serv.         Madelin Alvarado MD 12/4/21 10:51 AM EST

## 2021-12-04 NOTE — CARE COORDINATION
CASE MANAGEMENT DISCHARGE SUMMARY      Discharge to: [facility] skilled    Precertification completed: [facility] Exemption Notification (HENS) completed: yes    IMM given: (date)     New Durable Medical Equipment ordered/agency: na    Transportation:    Medical Transport explained to [employer]. Pt/family voice no agency preference. Agency used:[facility]   time:1630   Ambulance form completed: Yes    Confirmed discharge plan with:Siri MADDOX     Patient: yes/no     Family:  yes   Name: Contact number:Eric (left message hippa compliant)      Facility/Agency, name: [facility] faxed [phone]   Phone number for report to facility: [phone]     RN, name: Heather Thomas    Note: Discharging nurse to complete [facility], reconcile [facility], and place final copy with patient's discharge packet. RN to ensure that written prescriptions for  Level II medications are sent with patient to the facility as per protocol.

## 2023-06-19 NOTE — PROGRESS NOTES
Spoke with patient's brother and updated on plan of care. All questions answered. Patient's brother coming tomorrow for meeting with SCU team and palliative care. This said RN removed the IJ from R neck, IV and Tele with no complications. Attempted to call report 2x with no success. Transport picked up patient and belongings.

## 2023-12-31 NOTE — PROGRESS NOTES
Patient extubated placed on 3 LPM tolerated well. 88 year old female      h/o  hypothyroidism, arthritis bilat hip replacements /  olecranon fx/ prior h/o  dizziness      arrives  via EMS after unwitnessed fall this morning.       pt  lives alone /  woke up at 3am which is normal for her. . was eating breakfast and then she remembers finding herself on the ground.     denies  dizziness, palpitations, chest pain/sob/ abd pain      pain in the left lower extremity ./   has  difficulty  in ambulation since  syncopal event/  xrays  in er,  no fx      syncope  / collapse /  cause   unknown      tele   Raunaud;s disease/ fingers   Ct   head,  left b/ ganglia infarct/ not reported  asacute      on asa/  lipitor   Hypothyroid     h/o b/l hip surg, in the past    xrays, no fx/  has  rom  of  b/l legs/ has difficulty on ambulation,  from hip  pain,  s/p  collapse   has no abd pain     CT  pelvis,  thickened   sigmoid  colon/ with ?  fistula/ ?  mass     on dvt ppx     fall risk    echo   Influenza  +,  on tamiflu    per house  gi.  will  need  colonoscopy.  when stable     lives  alone. has  no children/   and is  full  code         ad< from: CT 3D Reconstruct w/ Workstation (12.29.23 @ 11:11) >  IMPRESSION:  1.  No fractures are seen.  2.  Long segment of mild to moderate wall thickening involves the upper   sigmoid colon with a soft tissue tract extending to the mid sigmoid   colon. These changes could reflect scarring fromprior inflammation and a   fistulous tract. Neoplasm cannot be absolutely excluded.  3.  Correlation with other prior imaging or prior colonoscopy is   suggested if available. Follow-up CT of the abdomen and pelvis with oral   and/or rectal contrast may be helpful to assess for a fistula. Consider   colonoscopy as warranted.  --- End of Report ---      rad< from: CT Head No Cont (12.29.23 @ 11:12) >  IMPRESSION:  1. Left basal ganglia infarction is an interval finding since 2021  2. No evidence of acute intracranial injury.  --- End of Report ---            <  88 year old female      h/o  hypothyroidism, arthritis bilat hip replacements /  olecranon fx/ prior h/o  dizziness      arrives  via EMS after unwitnessed fall this morning.       pt  lives alone /  woke up at 3am which is normal for her. . was eating breakfast and then she remembers finding herself on the ground.     denies  dizziness, palpitations, chest pain/sob/ abd pain      pain in the left lower extremity ./   has  difficulty  in ambulation since  syncopal event/  xrays  in er,  no fx      syncope  / collapse /  cause   unknown      tele   Raunaud;s disease/ fingers   Ct   head,  left b/ ganglia infarct/ not reported  asacute      on asa/  lipitor   Hypothyroid     h/o b/l hip surg, in the past    xrays, no fx/  has  rom  of  b/l legs/ has difficulty on ambulation,  from hip  pain,  s/p  collapse   has no abd pain     CT  pelvis,  thickened   sigmoid  colon/ with ?  fistula/ ?  mass     on dvt ppx     fall risk    echo   Influenza  +,  on tamiflu    per house  gi.  will  need  colonoscopy.  when stable     lives  alone. has  no children/   and is  full  code    dallas Churchill  is  [facility]/            ad< from: CT 3D Reconstruct w/ Workstation (12.29.23 @ 11:11) >  IMPRESSION:  1.  No fractures are seen.  2.  Long segment of mild to moderate wall thickening involves the upper   sigmoid colon with a soft tissue tract extending to the mid sigmoid   colon. These changes could reflect scarring fromprior inflammation and a   fistulous tract. Neoplasm cannot be absolutely excluded.  3.  Correlation with other prior imaging or prior colonoscopy is   suggested if available. Follow-up CT of the abdomen and pelvis with oral   and/or rectal contrast may be helpful to assess for a fistula. Consider   colonoscopy as warranted.  --- End of Report ---      rad< from: CT Head No Cont (12.29.23 @ 11:12) >  IMPRESSION:  1. Left basal ganglia infarction is an interval finding since 2021  2. No evidence of acute intracranial injury.  --- End of Report ---            <  88 year old female      h/o  hypothyroidism, arthritis bilat hip replacements /  olecranon fx/ prior h/o  dizziness      arrives  via EMS after unwitnessed fall this morning.       pt  lives alone /  woke up at 3am which is normal for her. . was eating breakfast and then she remembers finding herself on the ground.     denies  dizziness, palpitations, chest pain/sob/ abd pain      pain in the left lower extremity ./   has  difficulty  in ambulation since  syncopal event/  xrays  in er,  no fx      syncope  / collapse /  cause   unknown      tele   Raunaud;s disease/ fingers   Ct   head,  left b/ ganglia infarct/ not reported  asacute      on asa/  lipitor   Hypothyroid     h/o b/l hip surg, in the past    xrays, no fx/  has  rom  of  b/l legs/ has difficulty on ambulation,  from hip  pain,  s/p  collapse   has no abd pain     CT  pelvis,  thickened   sigmoid  colon/ with ?  fistula/ ?  mass     on dvt ppx     fall risk    echo   Influenza  +,  on tamiflu    per house  gi.  will  need  colonoscopy.  when stable     lives  alone. has  no children/   and is  full  code    dallas Churchill  is  [facility]/            ad< from: CT 3D Reconstruct w/ Workstation (12.29.23 @ 11:11) >  IMPRESSION:  1.  No fractures are seen.  2.  Long segment of mild to moderate wall thickening involves the upper   sigmoid colon with a soft tissue tract extending to the mid sigmoid   colon. These changes could reflect scarring fromprior inflammation and a   fistulous tract. Neoplasm cannot be absolutely excluded.  3.  Correlation with other prior imaging or prior colonoscopy is   suggested if available. Follow-up CT of the abdomen and pelvis with oral   and/or rectal contrast may be helpful to assess for a fistula. Consider   colonoscopy as warranted.  --- End of Report ---      rad< from: CT Head No Cont (12.29.23 @ 11:12) >  IMPRESSION:  1. Left basal ganglia infarction is an interval finding since 2021  2. No evidence of acute intracranial injury.  --- End of Report ---            <  88 year old female      h/o  hypothyroidism, arthritis bilat hip replacements /  olecranon fx/ prior h/o  dizziness      arrives  via EMS after unwitnessed fall this morning.       pt  lives alone /  woke up at 3am which is normal for her. . was eating breakfast and then she remembers finding herself on the ground.     denies  dizziness, palpitations, chest pain/sob/ abd pain      pain in the left lower extremity ./   has  difficulty  in ambulation since  syncopal event/  xrays  in er,  no fx      syncope  / collapse /  cause   unknown      tele   Raunaud;s disease/ fingers   Ct   head,  left b/ ganglia infarct/ not reported  asacute      on asa/  lipitor   Hypothyroid     h/o b/l hip surg, in the past    xrays, no fx/  has  rom  of  b/l legs/ has difficulty on ambulation,  from hip  pain,  s/p  collapse   has no abd pain     CT  pelvis,  thickened   sigmoid  colon/ with ?  fistula/ ?  mass     on dvt ppx     fall risk    echo   Influenza  +,  on tamiflu    per house  gi.  will  need  colonoscopy    and  per  hcp,  does  not   want  any  intervention / testing / and  is  aware  that  pt t  may  have  a  malignant   process       lives  alone. has  no children/   and is  full  code    dallas Churchill  is  hcp/            ad< from: CT 3D Reconstruct w/ Workstation (12.29.23 @ 11:11) >  IMPRESSION:  1.  No fractures are seen.  2.  Long segment of mild to moderate wall thickening involves the upper   sigmoid colon with a soft tissue tract extending to the mid sigmoid   colon. These changes could reflect scarring fromprior inflammation and a   fistulous tract. Neoplasm cannot be absolutely excluded.  3.  Correlation with other prior imaging or prior colonoscopy is   suggested if available. Follow-up CT of the abdomen and pelvis with oral   and/or rectal contrast may be helpful to assess for a fistula. Consider   colonoscopy as warranted.  --- End of Report ---      rad< from: CT Head No Cont (12.29.23 @ 11:12) >  IMPRESSION:  1. Left basal ganglia infarction is an interval finding since 2021  2. No evidence of acute intracranial injury.  --- End of Report ---            <  88 year old female      h/o  hypothyroidism, arthritis bilat hip replacements /  olecranon fx/ prior h/o  dizziness      arrives  via EMS after unwitnessed fall this morning.       pt  lives alone /  woke up at 3am which is normal for her. . was eating breakfast and then she remembers finding herself on the ground.     denies  dizziness, palpitations, chest pain/sob/ abd pain      pain in the left lower extremity ./   has  difficulty  in ambulation since  syncopal event/  xrays  in er,  no fx      syncope  / collapse /  cause   unknown      tele   Raunaud;s disease/ fingers   Ct   head,  left b/ ganglia infarct/ not reported  asacute      on asa/  lipitor   Hypothyroid     h/o b/l hip surg, in the past    xrays, no fx/  has  rom  of  b/l legs/ has difficulty on ambulation,  from hip  pain,  s/p  collapse   has no abd pain     CT  pelvis,  thickened   sigmoid  colon/ with ?  fistula/ ?  mass     on dvt ppx     fall risk    echo   Influenza  +,  on tamiflu     ct  head. infarct/  mri  head/  has  metal  in leg.  mri   dept  made  aware  by  np    per house  gi.  will  need  colonoscopy      and  per  hcp,  does  not   want  any  intervention / testing / and  is  aware  that  pt t  may  have  a  malignant   process      hcp  wants  home  as dispo      lives  alone. has  no children/   and is  full  code    dallas Churchill  is  hcp/            ad< from: CT 3D Reconstruct w/ Workstation (12.29.23 @ 11:11) >  IMPRESSION:  1.  No fractures are seen.  2.  Long segment of mild to moderate wall thickening involves the upper   sigmoid colon with a soft tissue tract extending to the mid sigmoid   colon. These changes could reflect scarring fromprior inflammation and a   fistulous tract. Neoplasm cannot be absolutely excluded.  3.  Correlation with other prior imaging or prior colonoscopy is   suggested if available. Follow-up CT of the abdomen and pelvis with oral   and/or rectal contrast may be helpful to assess for a fistula. Consider   colonoscopy as warranted.  --- End of Report ---      rad< from: CT Head No Cont (12.29.23 @ 11:12) >  IMPRESSION:  1. Left basal ganglia infarction is an interval finding since 2021  2. No evidence of acute intracranial injury.  --- End of Report ---            <

## (undated) DEVICE — ENDOSCOPIC KIT 2 12 FT OP4 DE2 GWN SYR

## (undated) DEVICE — CONMED SCOPE SAVER BITE BLOCK, 20X27 MM: Brand: SCOPE SAVER

## (undated) DEVICE — ELECTRODE,ECG,STRESS,FOAM,3PK: Brand: MEDLINE

## (undated) DEVICE — CANNULA NSL AD TBNG L7FT PVC STR NONFLARED PRNG O2 DEL W STD

## (undated) DEVICE — CLIP LIG L235CM RESOL 360 BX/20